# Patient Record
Sex: MALE | Race: WHITE | NOT HISPANIC OR LATINO | Employment: OTHER | URBAN - METROPOLITAN AREA
[De-identification: names, ages, dates, MRNs, and addresses within clinical notes are randomized per-mention and may not be internally consistent; named-entity substitution may affect disease eponyms.]

---

## 2017-01-30 ENCOUNTER — TELEPHONE (OUTPATIENT)
Dept: ONCOLOGY | Facility: CLINIC | Age: 67
End: 2017-01-30

## 2017-01-30 DIAGNOSIS — C61 PROSTATE CANCER (H): Chronic | ICD-10-CM

## 2017-01-30 LAB
ALBUMIN SERPL-MCNC: 3.5 G/DL (ref 3.4–5)
ALP SERPL-CCNC: 48 U/L (ref 40–150)
ALT SERPL W P-5'-P-CCNC: 36 U/L (ref 0–70)
ANION GAP SERPL CALCULATED.3IONS-SCNC: 10 MMOL/L (ref 3–14)
AST SERPL W P-5'-P-CCNC: 29 U/L (ref 0–45)
BASOPHILS # BLD AUTO: 0 10E9/L (ref 0–0.2)
BASOPHILS NFR BLD AUTO: 0.5 %
BILIRUB SERPL-MCNC: 0.6 MG/DL (ref 0.2–1.3)
BUN SERPL-MCNC: 13 MG/DL (ref 7–30)
CALCIUM SERPL-MCNC: 8.5 MG/DL (ref 8.5–10.1)
CHLORIDE SERPL-SCNC: 105 MMOL/L (ref 94–109)
CO2 SERPL-SCNC: 25 MMOL/L (ref 20–32)
CREAT SERPL-MCNC: 1 MG/DL (ref 0.66–1.25)
DIFFERENTIAL METHOD BLD: NORMAL
EOSINOPHIL # BLD AUTO: 0.2 10E9/L (ref 0–0.7)
EOSINOPHIL NFR BLD AUTO: 4 %
ERYTHROCYTE [DISTWIDTH] IN BLOOD BY AUTOMATED COUNT: 13.5 % (ref 10–15)
GFR SERPL CREATININE-BSD FRML MDRD: 75 ML/MIN/1.7M2
GLUCOSE SERPL-MCNC: 119 MG/DL (ref 70–99)
HCT VFR BLD AUTO: 41.9 % (ref 40–53)
HGB BLD-MCNC: 14.5 G/DL (ref 13.3–17.7)
LDH SERPL L TO P-CCNC: 174 U/L (ref 85–227)
LYMPHOCYTES # BLD AUTO: 2.1 10E9/L (ref 0.8–5.3)
LYMPHOCYTES NFR BLD AUTO: 34.6 %
MCH RBC QN AUTO: 31.9 PG (ref 26.5–33)
MCHC RBC AUTO-ENTMCNC: 34.6 G/DL (ref 31.5–36.5)
MCV RBC AUTO: 92 FL (ref 78–100)
MONOCYTES # BLD AUTO: 0.6 10E9/L (ref 0–1.3)
MONOCYTES NFR BLD AUTO: 10.3 %
NEUTROPHILS # BLD AUTO: 3 10E9/L (ref 1.6–8.3)
NEUTROPHILS NFR BLD AUTO: 50.6 %
PLATELET # BLD AUTO: 223 10E9/L (ref 150–450)
POTASSIUM SERPL-SCNC: 4.1 MMOL/L (ref 3.4–5.3)
PROT SERPL-MCNC: 7.1 G/DL (ref 6.8–8.8)
PSA SERPL-MCNC: 0.15 UG/L (ref 0–4)
RBC # BLD AUTO: 4.55 10E12/L (ref 4.4–5.9)
SODIUM SERPL-SCNC: 140 MMOL/L (ref 133–144)
WBC # BLD AUTO: 6 10E9/L (ref 4–11)

## 2017-01-30 PROCEDURE — 84403 ASSAY OF TOTAL TESTOSTERONE: CPT | Performed by: INTERNAL MEDICINE

## 2017-01-30 PROCEDURE — 80053 COMPREHEN METABOLIC PANEL: CPT | Performed by: INTERNAL MEDICINE

## 2017-01-30 PROCEDURE — 85025 COMPLETE CBC W/AUTO DIFF WBC: CPT | Performed by: INTERNAL MEDICINE

## 2017-01-30 PROCEDURE — 36415 COLL VENOUS BLD VENIPUNCTURE: CPT | Performed by: INTERNAL MEDICINE

## 2017-01-30 PROCEDURE — 83615 LACTATE (LD) (LDH) ENZYME: CPT | Performed by: INTERNAL MEDICINE

## 2017-01-30 PROCEDURE — 84153 ASSAY OF PSA TOTAL: CPT | Performed by: INTERNAL MEDICINE

## 2017-01-30 NOTE — TELEPHONE ENCOUNTER
Patient notified notified of PSA result 0.15 today. He has follow-up with Dr Norton on 2/6/17.    Tatiana Carbone RN

## 2017-01-31 LAB — TESTOST SERPL-MCNC: 725 NG/DL (ref 240–950)

## 2017-02-06 ENCOUNTER — ONCOLOGY VISIT (OUTPATIENT)
Dept: ONCOLOGY | Facility: CLINIC | Age: 67
End: 2017-02-06
Payer: COMMERCIAL

## 2017-02-06 VITALS
OXYGEN SATURATION: 97 % | RESPIRATION RATE: 20 BRPM | BODY MASS INDEX: 39.56 KG/M2 | WEIGHT: 261 LBS | TEMPERATURE: 98.2 F | HEIGHT: 68 IN | HEART RATE: 72 BPM | SYSTOLIC BLOOD PRESSURE: 179 MMHG | DIASTOLIC BLOOD PRESSURE: 82 MMHG

## 2017-02-06 DIAGNOSIS — C61 PROSTATE CANCER (H): Primary | Chronic | ICD-10-CM

## 2017-02-06 PROCEDURE — 99213 OFFICE O/P EST LOW 20 MIN: CPT | Performed by: INTERNAL MEDICINE

## 2017-02-06 ASSESSMENT — PAIN SCALES - GENERAL: PAINLEVEL: NO PAIN (0)

## 2017-02-06 NOTE — PROGRESS NOTES
[facility] CANCER CLINIC  FOLLOW-UP VISIT NOTE    PATIENT NAME: Boaz Acosta MRN # [id number]  DATE OF VISIT: Feb 6, 2017 YOB: 1950    REFERRING PROVIDER: Rocío Boss MD  [address] [address]    CANCER TYPE: Prostate adenocarcinoma  STAGE: IV  ECOG PS: 0    TREATMENT SUMMARY:  Boaz's annual screening PSA was noted to be elevated at 17 in 2012, and 21 in the following year and it was attributed to prostatitis. Eventually, he did change his primary care physician and his repeat PSA was noted to be elevated at 31 in 04/2014. He was then referred to Urology, and was seen by Dr. Pressley with Urology in 07/2014. He was worked up with a biopsy of the prostate on 08/01//2014, which revealed adenocarcinoma of the prostate with Willis score of 4+3 involving 12 of the 12 cores. He was staged with a PET/CT scan, which did not show any evidence of distant metastasis. There was evidence of increased FDG uptake throughout the prostate with a maximum SUV of 7.1. He had a bone scan done on 08/19/2014, which did not show any obvious evidence of metastasis. Mr. Acosta underwent a robotic-assisted radical prostatectomy and lymph node sampling performed by Dr. Hesham Prieto on 10/24/2014. The pathology from this revealed adenocarcinoma of the prostate with a Willis of 7 and a tertiary pattern of 5. There was extensive prostatic tissue involvement with over 75% of the tissue with evidence of disease. There was extensive extraprostatic extension involving the apex, right and left anterior and posterior base, and soft tissue around both seminal vesicles. There was bilateral seminal vesicle invasion with involvement of vas deferens. Margins were involved with bladder neck and bilateral posterior seminal vesicle soft tissue apex. There was evidence of perineural invasion. The lymph node sample was negative for disease. His postoperative PSA  in 12/2014 remained elevated at 7.1, and all the subsequent PSAs have remained positive. His restaging scans done in January and again in 03/2015, including a CT abdomen and pelvis and bone scans, have been negative. He was referred to Radiation Oncology for consideration of salvage radiation therapy, and was seen by Dr. Murphy on 03/24/2015. Dr. Murphy referred him to Dr. Zbigniew Chu at the [facility] to have a choline-11 PET/CT scan done. The choline-11 scan done at [place] demonstrated evidence of 2 retroperitoneal lymph nodes that are mildly positive for uptake. There was no abnormal uptake in the prostate bed to suggest local recurrence. He was started on bicalutamide, and received his first dose of Lupron on 05/12/2015.   He comes for a scheduled follow up visit    Oncology Supportive Medications 5/12/2015 9/2/2015 1/4/2016   leuprolide (LUPRON DEPOT) IM 30 mg 30 mg 30 mg     CURRENT TREATMENT  Break period of intermittent androgen deprivation therapy    RACHELLE Sandra comes for a scheduled follow up visit on androgen ablation therapy.     He is doing much better and feels great. He has improved energy and is feeling better. He was little shocked and worried after checking his testosteorne levels that are above 700. He denies taking any testosterone supplements.        PAST MEDICAL HISTORY   1. Prostate cancer with disease metastatic to para-aortic nodes   2. Borderline HTN  3. ADALID an CPAP at night  4. GERD      CURRENT OUTPATIENT MEDICATIONS     Current Outpatient Prescriptions   Medication Sig Dispense Refill     leuprolide (LUPRON DEPOT) 30 MG injection Inject 30 mg into the muscle every 4 months       chlorthalidone (HYGROTON) 25 MG tablet Take 1 tablet (25 mg) by mouth daily 90 tablet 1     omeprazole 20 MG tablet Take 1 tablet (20 mg) by mouth daily  30 tablet 1     ascorbic acid (VITAMIN C) 1000 MG TABS Take 1-2 tablets by mouth as needed        Multiple Vitamin (DAILY MULTIVITAMIN PO) Take 1 tablet by mouth  "daily.          ALLERGIES   No Known Allergies     REVIEW OF SYSTEMS   As above in the HPI, o/w complete 12-point ROS was negative.     PHYSICAL EXAM   /82 mmHg  Pulse 72  Temp(Src) 98.2  F (36.8  C) (Oral)  Resp 20  Ht 1.727 m (5' 7.99\")  Wt 118.389 kg (261 lb)  BMI 39.69 kg/m2  SpO2 97%    SpO2 Readings from Last 4 Encounters:   02/06/17 97%   08/01/16 96%   05/02/16 97%   04/15/16 98%     Wt Readings from Last 3 Encounters:   02/06/17 118.389 kg (261 lb)   08/01/16 116.801 kg (257 lb 8 oz)   05/02/16 117.028 kg (258 lb)     GEN: NAD  HEENT: PERRL, EOMI, no icterus, injection or pallor. Oropharynx is clear.  NECK: no cervical or supraclavicular lymphadenopathy  LUNGS: clear bilaterally  CV: regular, no murmurs, rubs, or gallops  ABDOMEN: soft, non-tender, non-distended, normal bowel sounds, no hepatosplenomegaly by percussion or palpation  EXT: warm, well perfused, no edema  NEURO: alert  SKIN: lesion in subxiphoid (epigastrium) as below     LABORATORY AND IMAGING STUDIES     Recent Labs   Lab Test  01/30/17   0702  07/28/16   0712  03/02/15   1648  10/25/14   0559  10/24/14   0615  09/26/14   0852   NA  140  138  140  139   --   137   POTASSIUM  4.1  4.6  3.9  4.0  3.4  4.2   CHLORIDE  105  103  104  103   --   102   CO2  25  30  32  34*   --   33*   ANIONGAP  10  5  4  2*   --   2*   BUN  13  19  15  15   --   17   CR  1.00  1.03  1.15  1.07  1.05  0.99   GLC  119*  106*  93  141*   --   142*   KERRY  8.5  9.1  9.1  8.4*   --   9.3     No results for input(s): MAG, PHOS in the last 74364 hours.  Recent Labs   Lab Test  01/30/17   0702  07/28/16   0712  10/25/14   0559  10/24/14   0615  09/26/14   0852  12/05/12   1259   WBC  6.0  5.6  10.5   --    --   5.8   HGB  14.5  13.4  12.2*  14.9  14.0  14.9   PLT  223  247  215   --    --   257   MCV  92  92  92   --    --   94   NEUTROPHIL  50.6  40.3   --    --    --   56.2     Recent Labs   Lab Test  01/30/17   0702  07/28/16   0712  12/05/12   1259 "   BILITOTAL  0.6  0.5  0.6   ALKPHOS  48  65  51   ALT  36  62  55   AST  29  35  46*   ALBUMIN  3.5  3.8  4.5   LDH  174  203   --      TSH   Date Value Ref Range Status   03/14/2005 2.22 0.4 - 5.0 mU/L Final     No results for input(s): CEA in the last 74946 hours.  Results for orders placed or performed in visit on 06/18/15   US Scrotum and Testicles    Narrative    Examination: Testicular ultrasound 6/18/2015 6:16 PM     Comparison: None.    History: Metastatic prostate cancer and left testicular mass.     Findings: There is homogeneous normal echotexture throughout the  bilateral testes. The left testicle measures 4.9 x 3.2 x 1.9 cm. The  left epididymal head measures 1.2 x 1.0 x 1.0 cm. The right testicle  measures 4.9 x 3.2 x 1.7 cm. The right epididymal head measures 1.3 x  0.7 x 1.1 cm. Doppler evaluation shows normal arterial waveforms to  the testes bilaterally. There is no hydrocele or varicocele. There is  no mass or abnormal calcifications within the testicles. Prominent  rete testes in the left testicle.     In the region of the left epididymal tail where the patient indicates  the palpable lump, there is a round nodule measuring 0.5 x 0.6 x 0.6  cm with a hyperechoic rim. The nodule is not hypervascular.      Impression    Impression: 0.6 cm round nodule with hyperechoic rim in the region of  the left epididymal tail. This may represent a thrombosed varicocele.  The nodule is extratesticular and therefore malignancy is highly  unlikely.    I have personally reviewed the examination and initial interpretation  and I agree with the findings.    MIKE QUACH MD     Recent Labs   Lab Test  01/30/17   0702  07/28/16   0712  04/28/16   0723  12/30/15   0832  08/19/15   0705   PSA  0.15  <0.01  <0.01  0.02  0.04   TESTOSTTOTAL  725  10*  <2*  12*  16*         ASSESSMENT AND PLAN   1. Prostate cancer with metastasis to retroperitoneal nodes with persistent PSA elevation post prostatectomy; ish 4+3  disease, tertiary score of 5  2. ECOG PS 0  3. No medical comorbiditiies    He is doing a whole lot better.  His testosterone is high at 725 and PSA remains low at 0.15. I feel that it is a good news that his testosterone levels are high given his rise in PSA. It would have been concerning if his testosterone was still 50 and he had rising PSA rather than the current high levels. At least now we can suppress it and would almost certainly expect a good response. We will continue with break from treatment as per the intermittent treatment protocol. We reviewed that there is no right or wrong strategy as both continuous and intermittent androgen deprivation gave very similar results. I value the good time with normal testosterone levels that are experienced in patients with intermittent androgen deprivation therapy. I will now see him in 3-4 months with labs a week prior to visit including CBC, CMP, PSA, testosterone.    He has a visit in his PCP in a week. He has no other health concerns.     Pavel Norton  ,  Division of Hematology, Oncology & Transplantation  [facility].

## 2017-02-06 NOTE — NURSING NOTE
"Boaz Acosta is a 66 year old male who presents for:  Chief Complaint   Patient presents with     Oncology Clinic Visit     6 mo f/u        Initial Vitals:  /82 mmHg  Pulse 72  Temp(Src) 98.2  F (36.8  C) (Oral)  Resp 20  Ht 1.727 m (5' 7.99\")  Wt 118.389 kg (261 lb)  BMI 39.69 kg/m2  SpO2 97% Estimated body mass index is 39.69 kg/(m^2) as calculated from the following:    Height as of this encounter: 1.727 m (5' 7.99\").    Weight as of this encounter: 118.389 kg (261 lb).. Body surface area is 2.38 meters squared. BP completed using cuff size: regular  No Pain (0) No LMP for male patient. Allergies and medications reviewed.     Medications: Medication refills not needed today.  Pharmacy name entered into [facility]:    [id number] [place] [facility] [place], [place] - [address] [facility] [id number] [place] [facility] [place] - [address] [facility]    Comments:     8 minutes for nursing intake (face to face time)   RAMIN GONZALEZ LPN          "

## 2017-02-12 ENCOUNTER — MYC MEDICAL ADVICE (OUTPATIENT)
Dept: FAMILY MEDICINE | Facility: CLINIC | Age: 67
End: 2017-02-12

## 2017-06-12 DIAGNOSIS — C61 PROSTATE CANCER (H): Chronic | ICD-10-CM

## 2017-06-12 LAB
ALBUMIN SERPL-MCNC: 3.4 G/DL (ref 3.4–5)
ALP SERPL-CCNC: 46 U/L (ref 40–150)
ALT SERPL W P-5'-P-CCNC: 68 U/L (ref 0–70)
ANION GAP SERPL CALCULATED.3IONS-SCNC: 6 MMOL/L (ref 3–14)
AST SERPL W P-5'-P-CCNC: 54 U/L (ref 0–45)
BASOPHILS # BLD AUTO: 0 10E9/L (ref 0–0.2)
BASOPHILS NFR BLD AUTO: 0.3 %
BILIRUB SERPL-MCNC: 0.4 MG/DL (ref 0.2–1.3)
BUN SERPL-MCNC: 18 MG/DL (ref 7–30)
CALCIUM SERPL-MCNC: 8.7 MG/DL (ref 8.5–10.1)
CHLORIDE SERPL-SCNC: 105 MMOL/L (ref 94–109)
CO2 SERPL-SCNC: 29 MMOL/L (ref 20–32)
CREAT SERPL-MCNC: 1.01 MG/DL (ref 0.66–1.25)
DIFFERENTIAL METHOD BLD: ABNORMAL
EOSINOPHIL # BLD AUTO: 0.3 10E9/L (ref 0–0.7)
EOSINOPHIL NFR BLD AUTO: 3.8 %
ERYTHROCYTE [DISTWIDTH] IN BLOOD BY AUTOMATED COUNT: 13.5 % (ref 10–15)
GFR SERPL CREATININE-BSD FRML MDRD: 74 ML/MIN/1.7M2
GLUCOSE SERPL-MCNC: 113 MG/DL (ref 70–99)
HCT VFR BLD AUTO: 40.6 % (ref 40–53)
HGB BLD-MCNC: 13.7 G/DL (ref 13.3–17.7)
LDH SERPL L TO P-CCNC: 220 U/L (ref 85–227)
LYMPHOCYTES # BLD AUTO: 2.2 10E9/L (ref 0.8–5.3)
LYMPHOCYTES NFR BLD AUTO: 33.2 %
MCH RBC QN AUTO: 32.2 PG (ref 26.5–33)
MCHC RBC AUTO-ENTMCNC: 33.7 G/DL (ref 31.5–36.5)
MCV RBC AUTO: 95 FL (ref 78–100)
MONOCYTES # BLD AUTO: 0.7 10E9/L (ref 0–1.3)
MONOCYTES NFR BLD AUTO: 10.9 %
NEUTROPHILS # BLD AUTO: 3.4 10E9/L (ref 1.6–8.3)
NEUTROPHILS NFR BLD AUTO: 51.8 %
PLATELET # BLD AUTO: 204 10E9/L (ref 150–450)
POTASSIUM SERPL-SCNC: 4.5 MMOL/L (ref 3.4–5.3)
PROT SERPL-MCNC: 6.9 G/DL (ref 6.8–8.8)
PSA SERPL-MCNC: 0.49 UG/L (ref 0–4)
RBC # BLD AUTO: 4.26 10E12/L (ref 4.4–5.9)
SODIUM SERPL-SCNC: 140 MMOL/L (ref 133–144)
WBC # BLD AUTO: 6.6 10E9/L (ref 4–11)

## 2017-06-12 PROCEDURE — 80053 COMPREHEN METABOLIC PANEL: CPT | Performed by: INTERNAL MEDICINE

## 2017-06-12 PROCEDURE — 36415 COLL VENOUS BLD VENIPUNCTURE: CPT | Performed by: INTERNAL MEDICINE

## 2017-06-12 PROCEDURE — 85025 COMPLETE CBC W/AUTO DIFF WBC: CPT | Performed by: INTERNAL MEDICINE

## 2017-06-12 PROCEDURE — 83615 LACTATE (LD) (LDH) ENZYME: CPT | Performed by: INTERNAL MEDICINE

## 2017-06-12 PROCEDURE — 84153 ASSAY OF PSA TOTAL: CPT | Performed by: INTERNAL MEDICINE

## 2017-06-12 PROCEDURE — 84403 ASSAY OF TOTAL TESTOSTERONE: CPT | Performed by: INTERNAL MEDICINE

## 2017-06-14 LAB — TESTOST SERPL-MCNC: 604 NG/DL (ref 240–950)

## 2017-06-19 ENCOUNTER — ONCOLOGY VISIT (OUTPATIENT)
Dept: ONCOLOGY | Facility: CLINIC | Age: 67
End: 2017-06-19
Payer: COMMERCIAL

## 2017-06-19 VITALS
HEIGHT: 68 IN | HEART RATE: 69 BPM | RESPIRATION RATE: 15 BRPM | TEMPERATURE: 97 F | OXYGEN SATURATION: 96 % | WEIGHT: 252 LBS | SYSTOLIC BLOOD PRESSURE: 159 MMHG | DIASTOLIC BLOOD PRESSURE: 91 MMHG | BODY MASS INDEX: 38.19 KG/M2

## 2017-06-19 DIAGNOSIS — C61 PROSTATE CANCER (H): Primary | Chronic | ICD-10-CM

## 2017-06-19 PROCEDURE — 99213 OFFICE O/P EST LOW 20 MIN: CPT | Performed by: INTERNAL MEDICINE

## 2017-06-19 ASSESSMENT — PAIN SCALES - GENERAL: PAINLEVEL: NO PAIN (0)
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
## 2017-06-19 NOTE — NURSING NOTE
"Oncology Rooming Note    June 19, 2017 8:34 AM   Boaz Acosta is a 66 year old male who presents for:    Chief Complaint   Patient presents with     Oncology Clinic Visit     follow up     Initial Vitals: BP (!) 159/91  Pulse 69  Temp 97  F (36.1  C)  Resp 15  Ht 1.727 m (5' 7.99\")  Wt 114.3 kg (252 lb)  SpO2 96%  BMI 38.33 kg/m2 Estimated body mass index is 38.33 kg/(m^2) as calculated from the following:    Height as of this encounter: 1.727 m (5' 7.99\").    Weight as of this encounter: 114.3 kg (252 lb). Body surface area is 2.34 meters squared.  No Pain (0) Comment: Data Unavailable   No LMP for male patient.  Allergies reviewed: Yes  Medications reviewed: Yes    Medications: Medication refills not needed today.  Pharmacy name entered into [facility]:    [facility] [place] [place] [facility] [address] [facility] [address] [place], [place] - [address] [facility]        5 minutes for nursing intake (face to face time)     Jelly Mann LPN          "
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
## 2017-10-09 ENCOUNTER — TELEPHONE (OUTPATIENT)
Dept: ONCOLOGY | Facility: CLINIC | Age: 67
End: 2017-10-09

## 2017-10-09 DIAGNOSIS — C61 PROSTATE CANCER (H): Chronic | ICD-10-CM

## 2017-10-09 LAB
ALBUMIN SERPL-MCNC: 3.5 G/DL (ref 3.4–5)
ALP SERPL-CCNC: 51 U/L (ref 40–150)
ALT SERPL W P-5'-P-CCNC: 46 U/L (ref 0–70)
ANION GAP SERPL CALCULATED.3IONS-SCNC: 7 MMOL/L (ref 3–14)
AST SERPL W P-5'-P-CCNC: 32 U/L (ref 0–45)
BASOPHILS # BLD AUTO: 0 10E9/L (ref 0–0.2)
BASOPHILS NFR BLD AUTO: 0.5 %
BILIRUB SERPL-MCNC: 0.5 MG/DL (ref 0.2–1.3)
BUN SERPL-MCNC: 15 MG/DL (ref 7–30)
CALCIUM SERPL-MCNC: 9.3 MG/DL (ref 8.5–10.1)
CHLORIDE SERPL-SCNC: 105 MMOL/L (ref 94–109)
CO2 SERPL-SCNC: 27 MMOL/L (ref 20–32)
CREAT SERPL-MCNC: 1.01 MG/DL (ref 0.66–1.25)
DIFFERENTIAL METHOD BLD: NORMAL
EOSINOPHIL # BLD AUTO: 0.2 10E9/L (ref 0–0.7)
EOSINOPHIL NFR BLD AUTO: 3.5 %
ERYTHROCYTE [DISTWIDTH] IN BLOOD BY AUTOMATED COUNT: 13.5 % (ref 10–15)
GFR SERPL CREATININE-BSD FRML MDRD: 74 ML/MIN/1.7M2
GLUCOSE SERPL-MCNC: 112 MG/DL (ref 70–99)
HCT VFR BLD AUTO: 43.5 % (ref 40–53)
HGB BLD-MCNC: 14.8 G/DL (ref 13.3–17.7)
LDH SERPL L TO P-CCNC: 204 U/L (ref 85–227)
LYMPHOCYTES # BLD AUTO: 2 10E9/L (ref 0.8–5.3)
LYMPHOCYTES NFR BLD AUTO: 31.9 %
MCH RBC QN AUTO: 32.2 PG (ref 26.5–33)
MCHC RBC AUTO-ENTMCNC: 34 G/DL (ref 31.5–36.5)
MCV RBC AUTO: 95 FL (ref 78–100)
MONOCYTES # BLD AUTO: 0.7 10E9/L (ref 0–1.3)
MONOCYTES NFR BLD AUTO: 11.4 %
NEUTROPHILS # BLD AUTO: 3.3 10E9/L (ref 1.6–8.3)
NEUTROPHILS NFR BLD AUTO: 52.7 %
PLATELET # BLD AUTO: 225 10E9/L (ref 150–450)
POTASSIUM SERPL-SCNC: 4.3 MMOL/L (ref 3.4–5.3)
PROT SERPL-MCNC: 7.6 G/DL (ref 6.8–8.8)
PSA SERPL-MCNC: 1.7 UG/L (ref 0–4)
RBC # BLD AUTO: 4.6 10E12/L (ref 4.4–5.9)
SODIUM SERPL-SCNC: 139 MMOL/L (ref 133–144)
WBC # BLD AUTO: 6.2 10E9/L (ref 4–11)

## 2017-10-09 PROCEDURE — 85025 COMPLETE CBC W/AUTO DIFF WBC: CPT | Performed by: INTERNAL MEDICINE

## 2017-10-09 PROCEDURE — 36415 COLL VENOUS BLD VENIPUNCTURE: CPT | Performed by: INTERNAL MEDICINE

## 2017-10-09 PROCEDURE — 80053 COMPREHEN METABOLIC PANEL: CPT | Performed by: INTERNAL MEDICINE

## 2017-10-09 PROCEDURE — 84153 ASSAY OF PSA TOTAL: CPT | Performed by: INTERNAL MEDICINE

## 2017-10-09 PROCEDURE — 83615 LACTATE (LD) (LDH) ENZYME: CPT | Performed by: INTERNAL MEDICINE

## 2017-10-09 PROCEDURE — 84403 ASSAY OF TOTAL TESTOSTERONE: CPT | Performed by: INTERNAL MEDICINE

## 2017-10-09 NOTE — TELEPHONE ENCOUNTER
Received vm from patient requesting PSA result from today. Writer returned call and provided result. Patient states it is not bad since Dr Norton instructed him he would probably not consider treating until it reached about 10.   Pascale Steel  RN, BSN, OCN

## 2017-10-11 LAB — TESTOST SERPL-MCNC: 654 NG/DL (ref 240–950)

## 2017-10-12 ENCOUNTER — MYC MEDICAL ADVICE (OUTPATIENT)
Dept: FAMILY MEDICINE | Facility: CLINIC | Age: 67
End: 2017-10-12
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
## 2017-10-12 RX ORDER — DOXYCYCLINE HYCLATE 100 MG
200 TABLET ORAL ONCE
Qty: 2 TABLET | Refills: 0 | Status: SHIPPED | OUTPATIENT
Start: 2017-10-12 | End: 2017-10-12

## 2017-10-12 NOTE — TELEPHONE ENCOUNTER
Boaz Acosta   to Rocío Boss MD          10/12/17 3:44 PM   The area, on my bicep, is already inflamed.   Lara Acosta   Thanks

## 2017-10-16 ENCOUNTER — ONCOLOGY VISIT (OUTPATIENT)
Dept: ONCOLOGY | Facility: CLINIC | Age: 67
End: 2017-10-16
Payer: MEDICARE

## 2017-10-16 VITALS
OXYGEN SATURATION: 97 % | TEMPERATURE: 98.3 F | HEART RATE: 71 BPM | SYSTOLIC BLOOD PRESSURE: 188 MMHG | BODY MASS INDEX: 38.04 KG/M2 | WEIGHT: 251 LBS | HEIGHT: 68 IN | DIASTOLIC BLOOD PRESSURE: 93 MMHG | RESPIRATION RATE: 15 BRPM

## 2017-10-16 DIAGNOSIS — Z72.0 TOBACCO ABUSE: ICD-10-CM

## 2017-10-16 DIAGNOSIS — Z23 NEED FOR PROPHYLACTIC VACCINATION AND INOCULATION AGAINST INFLUENZA: ICD-10-CM

## 2017-10-16 DIAGNOSIS — C61 PROSTATE CANCER (H): Primary | ICD-10-CM

## 2017-10-16 DIAGNOSIS — Z71.6 TOBACCO ABUSE COUNSELING: ICD-10-CM

## 2017-10-16 PROCEDURE — 99213 OFFICE O/P EST LOW 20 MIN: CPT | Performed by: INTERNAL MEDICINE

## 2017-10-16 RX ORDER — DOXYCYCLINE 40 MG/1
40 CAPSULE ORAL 2 TIMES DAILY
COMMUNITY
End: 2018-06-18

## 2017-10-16 ASSESSMENT — PAIN SCALES - GENERAL: PAINLEVEL: NO PAIN (0)

## 2017-10-16 NOTE — MR AVS SNAPSHOT
After Visit Summary   10/16/2017    Boaz Acosta    MRN: [id number]           Patient Information     Date Of Birth          1950        Visit Information        Provider Department      10/16/2017 8:30 AM Pavel Norton MD [facility]         Follow-ups after your visit        Your next 10 appointments already scheduled     Feb 12, 2018  8:10 AM CST   LAB with LAB FIRST FLOOR [facility] ([facility])    [address] [address] [place] [id number] [phone]           Patient must bring picture ID. Patient should be prepared to give a urine specimen  Please do not eat 10-12 hours before your appointment if you are coming in fasting for labs on lipids, cholesterol, or glucose (sugar). Pregnant women should follow their Care Team instructions. Water with medications is okay. Do not drink coffee or other fluids. If you have concerns about taking  your medications, please ask at office or if scheduling via [employer], send a message by clicking on Secure Messaging, Message Your Care Team.            Feb 19, 2018  8:30 AM CST   Return Visit with Pavel Norton MD   [facility] ([facility])    [address] [address] [place] [id number] [phone]              Who to contact     If you have questions or need follow up information about today's clinic visit or your schedule please contact [facility] directly at [phone].  Normal or non-critical lab and imaging results will be communicated to you by MyChart, letter or phone within 4 business days after the clinic has received the results. If you do not hear from us within 7 days, please contact the clinic through MyChart or phone. If you have a critical or abnormal lab result, we will notify you by phone as soon as possible.  Submit refill requests through [employer] or call your pharmacy and they will forward the refill  "request to us. Please allow 3 business days for your refill to be completed.          Additional Information About Your Visit        [employer] Information     [employer] gives you secure access to your electronic health record. If you see a primary care provider, you can also send messages to your care team and make appointments. If you have questions, please call your primary care clinic.  If you do not have a primary care provider, please call [phone] and they will assist you.      [employer] is an electronic gateway that provides easy, online access to your medical records. With [employer], you can request a clinic appointment, read your test results, renew a prescription or communicate with your care team.     To access your existing account, please contact your [facility] or call [phone] for assistance.        Care EveryWhere ID     This is your Care EveryWhere ID. This could be used by other organizations to access your [facility] medical records  [id number]        Your Vitals Were     Pulse Temperature Respirations Height Pulse Oximetry BMI (Body Mass Index)    71 98.3  F (36.8  C) 15 1.727 m (5' 7.99\") 97% 38.17 kg/m2       Blood Pressure from Last 3 Encounters:   10/16/17 (!) 188/93   06/19/17 (!) 159/91   02/06/17 179/82    Weight from Last 3 Encounters:   10/16/17 113.9 kg (251 lb)   06/19/17 114.3 kg (252 lb)   02/06/17 118.4 kg (261 lb)              Today, you had the following     No orders found for display       Primary Care Provider Office Phone # Fax #    Rocío Boss [phone] [address] [facility] [address]        Equal Access to Services     [facility] : Hadii maximus fernandes hadderick Sobobby, waaxda luqadaha, qaybta kaalmada re genao. So [facility] [phone].    ATENCIÓN: Si habla español, tiene a humphries disposición servicios gratuitos de asistencia lingüística. Llame al [phone].    We comply " with applicable federal civil rights laws and [place] laws. We do not discriminate on the basis of race, color, national origin, age, disability, sex, sexual orientation, or gender identity.            Thank you!     Thank you for choosing [facility]  for your care. Our goal is always to provide you with excellent care. Hearing back from our patients is one way we can continue to improve our services. Please take a few minutes to complete the written survey that you may receive in the mail after your visit with us. Thank you!             Your Updated Medication List - Protect others around you: Learn how to safely use, store and throw away your medicines at www.disposemymeds.org.          This list is accurate as of: 10/16/17  9:05 AM.  Always use your most recent med list.                   Brand Name Dispense Instructions for use Diagnosis    ascorbic acid 1000 MG Tabs    vitamin C     Take 1-2 tablets by mouth as needed        DAILY MULTIVITAMIN PO      Take 1 tablet by mouth daily.        doxycycline (Rosacea) 40 MG Cpdr CR capsule    ORACEA     Take 40 mg by mouth 2 times daily        EPIPEN 2-BRIAN 0.3 MG/0.3ML injection 2-pack   Generic drug:  EPINEPHrine      Inject 0.3 mLs (0.3 mg) into the muscle once as needed        omeprazole 20 MG tablet      Take 20 mg by mouth daily as needed        order for DME      PATIENT WAS SET UP ON A RESPIRONICS 560 AUTO MACHINE AT [employer] WITH HEATED HUMIDITY. PATIENT HAD A CHOICE OF MASK AND CHOSE THE AIRFIT P10 SIZE MEDIUM NASAL PILLOW. HE HAS A F/U APPOINTMENT TO HIS SLEEP STUDY 2/5 WITH TURNER GLASER PA-C AND A SECOND ONE SCHEDULED IN 6 WEEKS.

## 2017-10-16 NOTE — PROGRESS NOTES
[facility] CANCER CLINIC  FOLLOW-UP VISIT NOTE    PATIENT NAME: Boaz Acosta MRN # [id number]  DATE OF VISIT: Oct 16, 2017 YOB: 1950    REFERRING PROVIDER: Rocío Boss MD  [address] [address]    CANCER TYPE: Prostate adenocarcinoma  STAGE: IV  ECOG PS: 0    TREATMENT SUMMARY:  Boaz's annual screening PSA was noted to be elevated at 17 in 2012, and 21 in the following year and it was attributed to prostatitis. Eventually, he did change his primary care physician and his repeat PSA was noted to be elevated at 31 in 04/2014. He was then referred to Urology, and was seen by Dr. Pressley with Urology in 07/2014. He was worked up with a biopsy of the prostate on 08/01//2014, which revealed adenocarcinoma of the prostate with Willis score of 4+3 involving 12 of the 12 cores. He was staged with a PET/CT scan, which did not show any evidence of distant metastasis. There was evidence of increased FDG uptake throughout the prostate with a maximum SUV of 7.1. He had a bone scan done on 08/19/2014, which did not show any obvious evidence of metastasis. Mr. Acosta underwent a robotic-assisted radical prostatectomy and lymph node sampling performed by Dr. Hesham Prieto on 10/24/2014. The pathology from this revealed adenocarcinoma of the prostate with a [place] of 7 and a tertiary pattern of 5. There was extensive prostatic tissue involvement with over 75% of the tissue with evidence of disease. There was extensive extraprostatic extension involving the apex, right and left anterior and posterior base, and soft tissue around both seminal vesicles. There was bilateral seminal vesicle invasion with involvement of vas deferens. Margins were involved with bladder neck and bilateral posterior seminal vesicle soft tissue apex. There was evidence of perineural invasion. The lymph node sample was negative for disease. His postoperative PSA  in 12/2014 remained elevated at 7.1, and all the subsequent PSAs have remained positive. His restaging scans done in January and again in 03/2015, including a CT abdomen and pelvis and bone scans, have been negative. He was referred to Radiation Oncology for consideration of salvage radiation therapy, and was seen by Dr. Murphy on 03/24/2015. Dr. Murphy referred him to Dr. Zbigniew Chu at the [facility] to have a choline-11 PET/CT scan done. The choline-11 scan done at [facility] demonstrated evidence of 2 retroperitoneal lymph nodes that are mildly positive for uptake. There was no abnormal uptake in the prostate bed to suggest local recurrence. He was started on bicalutamide, and received his first dose of Lupron on 05/12/2015.   He comes for a scheduled follow up visit    Oncology Supportive Medications 5/12/2015 9/2/2015 1/4/2016   leuprolide (LUPRON DEPOT) IM 30 mg 30 mg 30 mg     CURRENT TREATMENT  Break period of intermittent androgen deprivation therapy    RACHELLE Sandra comes for a scheduled follow up visit on androgen ablation therapy.     He feels great. He has retired about 4 months ago. He had been on a trip for month with his son. He had been fly fishing in [place], attended a seminar in [facility] in [place].  His son had severe difficulty after being exposed to black mold where he had to stay outside the house in his tent. His son is doing much better now.       PAST MEDICAL HISTORY   1. Prostate cancer with disease metastatic to para-aortic nodes   2. Borderline HTN  3. ADALID an CPAP at night  4. GERD      CURRENT OUTPATIENT MEDICATIONS     Current Outpatient Prescriptions   Medication Sig Dispense Refill     omeprazole 20 MG tablet Take 1 tablet (20 mg) by mouth daily  30 tablet 1     ascorbic acid (VITAMIN C) 1000 MG TABS Take 1-2 tablets by mouth as needed        Multiple Vitamin (DAILY MULTIVITAMIN PO) Take 1 tablet by mouth daily.          ALLERGIES   No Known Allergies     REVIEW OF SYSTEMS   As above  "in the HPI, o/w complete 12-point ROS was negative.     PHYSICAL EXAM   BP (!) 188/93  Pulse 71  Temp 98.3  F (36.8  C)  Resp 15  Ht 1.727 m (5' 7.99\")  Wt 113.9 kg (251 lb)  SpO2 97%  BMI 38.17 kg/m2    SpO2 Readings from Last 4 Encounters:   10/16/17 97%   06/19/17 96%   02/06/17 97%   08/01/16 96%     Wt Readings from Last 3 Encounters:   10/16/17 113.9 kg (251 lb)   06/19/17 114.3 kg (252 lb)   02/06/17 118.4 kg (261 lb)     GEN: NAD  HEENT: PERRL, EOMI, no icterus, injection or pallor. Oropharynx is clear.  NECK: no cervical or supraclavicular lymphadenopathy  LUNGS: clear bilaterally  CV: regular, no murmurs, rubs, or gallops  ABDOMEN: soft, non-tender, non-distended, normal bowel sounds, no hepatosplenomegaly by percussion or palpation  EXT: warm, well perfused, no edema  NEURO: alert  SKIN: lesion in subxiphoid (epigastrium) as below     LABORATORY AND IMAGING STUDIES     Recent Labs   Lab Test  10/09/17   0750  06/12/17   0720  01/30/17   0702 07/28/16   0712  03/02/15   1648   NA  139  140  140  138  140   POTASSIUM  4.3  4.5  4.1  4.6  3.9   CHLORIDE  105  105  105  103  104   CO2  27  29  25  30  32   ANIONGAP  7  6  10  5  4   BUN  15  18  13  19  15   CR  1.01  1.01  1.00  1.03  1.15   GLC  112*  113*  119*  106*  93   KERRY  9.3  8.7  8.5  9.1  9.1     No results for input(s): MAG, PHOS in the last 21351 hours.  Recent Labs   Lab Test  10/09/17   0750  06/12/17   0720 01/30/17   0702  07/28/16   0712  10/25/14   0559   12/05/12   1259   WBC  6.2  6.6  6.0  5.6  10.5   --   5.8   HGB  14.8  13.7  14.5  13.4  12.2*   < >  14.9   PLT  225  204  223  247  215   --   257   MCV  95  95  92  92  92   --   94   NEUTROPHIL  52.7  51.8  50.6  40.3   --    --   56.2    < > = values in this interval not displayed.     Recent Labs   Lab Test  10/09/17   0750  06/12/17   0720  01/30/17   0702   BILITOTAL  0.5  0.4  0.6   ALKPHOS  51  46  48   ALT  46  68  36   AST  32  54*  29   ALBUMIN  3.5  3.4  3.5   LDH  " 204  220  174     TSH   Date Value Ref Range Status   03/14/2005 2.22 0.4 - 5.0 mU/L Final     No results for input(s): CEA in the last 50161 hours.  Results for orders placed or performed in visit on 06/18/15   US Scrotum and Testicles    Narrative    Examination: Testicular ultrasound 6/18/2015 6:16 PM     Comparison: None.    History: Metastatic prostate cancer and left testicular mass.     Findings: There is homogeneous normal echotexture throughout the  bilateral testes. The left testicle measures 4.9 x 3.2 x 1.9 cm. The  left epididymal head measures 1.2 x 1.0 x 1.0 cm. The right testicle  measures 4.9 x 3.2 x 1.7 cm. The right epididymal head measures 1.3 x  0.7 x 1.1 cm. Doppler evaluation shows normal arterial waveforms to  the testes bilaterally. There is no hydrocele or varicocele. There is  no mass or abnormal calcifications within the testicles. Prominent  rete testes in the left testicle.     In the region of the left epididymal tail where the patient indicates  the palpable lump, there is a round nodule measuring 0.5 x 0.6 x 0.6  cm with a hyperechoic rim. The nodule is not hypervascular.      Impression    Impression: 0.6 cm round nodule with hyperechoic rim in the region of  the left epididymal tail. This may represent a thrombosed varicocele.  The nodule is extratesticular and therefore malignancy is highly  unlikely.    I have personally reviewed the examination and initial interpretation  and I agree with the findings.    MIKE QUACH MD     Recent Labs   Lab Test  10/09/17   0750  06/12/17   0720  01/30/17   0702  07/28/16   0712  04/28/16   0723   PSA  1.70  0.49  0.15  <0.01  <0.01   TESTOSTTOTAL  654  604  725  10*  <2 *           ASSESSMENT AND PLAN   1. Prostate cancer with metastasis to retroperitoneal nodes with persistent PSA elevation post prostatectomy; ish 4+3 disease, tertiary score of 5  2. ECOG PS 0  3. No medical comorbiditiies    He is doing a whole lot better.  His  testosterone is high at 654 and PSA remains low at 1.7 though elevated from 0.49.  He has recently retired and is going through his checklist of activities.     He has no other health concerns - though he had elevated blood pressure at this visit.     I value the good time with normal testosterone levels that are experienced in patients with intermittent androgen deprivation therapy. I will now see him in 4 months with labs a week prior to visit including CBC, CMP, PSA, testosterone.    Pavel Norton  ,  Division of Hematology, Oncology & Transplantation  [facility].

## 2017-10-16 NOTE — NURSING NOTE
"Oncology Rooming Note    October 16, 2017 8:35 AM   Boaz Acosta is a 67 year old male who presents for:    Chief Complaint   Patient presents with     Oncology Clinic Visit     4 month follow up      Initial Vitals: BP (!) 188/93  Pulse 71  Temp 98.3  F (36.8  C)  Resp 15  Ht 1.727 m (5' 7.99\")  Wt 113.9 kg (251 lb)  SpO2 97%  BMI 38.17 kg/m2 Estimated body mass index is 38.17 kg/(m^2) as calculated from the following:    Height as of this encounter: 1.727 m (5' 7.99\").    Weight as of this encounter: 113.9 kg (251 lb). Body surface area is 2.34 meters squared.  No Pain (0) Comment: Data Unavailable   No LMP for male patient.  Allergies reviewed: Yes  Medications reviewed: Yes    Medications: Medication refills not needed today.  Pharmacy name entered into [facility]:    [facility] [place], [place] [facility] [address] [facility]  [facility] [place] [place] [facility] [address] [facility]        5 minutes for nursing intake (face to face time)     Jelly Mann LPN              "

## 2017-10-16 NOTE — PATIENT INSTRUCTIONS

## 2017-10-24 ENCOUNTER — MYC MEDICAL ADVICE (OUTPATIENT)
Dept: FAMILY MEDICINE | Facility: CLINIC | Age: 67
End: 2017-10-24

## 2017-10-30 ENCOUNTER — OFFICE VISIT (OUTPATIENT)
Dept: FAMILY MEDICINE | Facility: CLINIC | Age: 67
End: 2017-10-30
Payer: MEDICARE

## 2017-10-30 VITALS
SYSTOLIC BLOOD PRESSURE: 130 MMHG | HEIGHT: 68 IN | WEIGHT: 252 LBS | TEMPERATURE: 97.7 F | DIASTOLIC BLOOD PRESSURE: 84 MMHG | BODY MASS INDEX: 38.19 KG/M2 | HEART RATE: 61 BPM | OXYGEN SATURATION: 97 %

## 2017-10-30 DIAGNOSIS — R03.0 ELEVATED BLOOD PRESSURE READING WITHOUT DIAGNOSIS OF HYPERTENSION: ICD-10-CM

## 2017-10-30 DIAGNOSIS — M10.072 ACUTE IDIOPATHIC GOUT INVOLVING TOE OF LEFT FOOT: Primary | ICD-10-CM

## 2017-10-30 PROCEDURE — 36415 COLL VENOUS BLD VENIPUNCTURE: CPT | Performed by: FAMILY MEDICINE

## 2017-10-30 PROCEDURE — 99213 OFFICE O/P EST LOW 20 MIN: CPT | Performed by: FAMILY MEDICINE

## 2017-10-30 PROCEDURE — 84550 ASSAY OF BLOOD/URIC ACID: CPT | Performed by: FAMILY MEDICINE

## 2017-10-30 NOTE — PROGRESS NOTES
"  SUBJECTIVE:   Boaz Acosta is a 67 year old male who presents to clinic today for the following health issues:      Gout  Duration: 11 days  Description   Location: foot - right  Joint Swelling: YES- at onset  Redness: YES- at onset   Pain intensity:  Currently 3/10  Accompanying signs and symptoms: None  History  Previous history of gout: once in the spring   Trauma to the area: no   Precipitating factors:   Alcohol usage: Frequent  Diuretic use: no   Recent illness: yes  Therapies tried and outcome: taking ibuprofen     Patient states he has a strong fx of gout  This spring had episode on L 2nd toe - homeopathic treatment  Current episode episode started 2 weeks ago, was on a low carb/high fat diet  Patient has h/o prostate cancer 3 yrs ago    Problem list and histories reviewed & adjusted, as indicated.  Additional history: as documented    BP Readings from Last 3 Encounters:   10/30/17 130/84   10/16/17 (!) 188/93   06/19/17 (!) 159/91    Wt Readings from Last 3 Encounters:   10/30/17 252 lb (114.3 kg)   10/16/17 251 lb (113.9 kg)   06/19/17 252 lb (114.3 kg)            Reviewed and updated as needed this visit by clinical staffTobacco  Allergies  Meds  Problems  Med Hx  Surg Hx  Fam Hx  Soc Hx        Reviewed and updated as needed this visit by Provider  Allergies  Meds  Problems         ROS:  Constitutional, HEENT, cardiovascular, pulmonary, gi and gu systems are negative, except as otherwise noted.    OBJECTIVE:   /84  Pulse 61  Temp 97.7  F (36.5  C) (Oral)  Ht 5' 7.99\" (1.727 m)  Wt 252 lb (114.3 kg)  SpO2 97%  BMI 38.33 kg/m2  Body mass index is 38.33 kg/(m^2).  GENERAL: healthy, alert and no distress  NECK: no adenopathy, no asymmetry, masses, or scars and thyroid normal to palpation  RESP: lungs clear to auscultation - no rales, rhonchi or wheezes  CV: regular rate and rhythm, normal S1 S2, no S3 or S4, no murmur, click or rub, no peripheral edema and peripheral pulses " strong  MS: No joint deformity, no joint redness or swelling at this timeno gross musculoskeletal defects noted, no edema  SKIN: no suspicious lesions or rashes  NEURO: Normal strength and tone, mentation intact and speech normal  PSYCH: mentation appears normal, affect normal/bright    ASSESSMENT/PLAN:       1. Acute idiopathic gout involving toe of left foot  Will obtain baseline uric acid, patient would rather treat with diet changes than medications  - Uric acid    2. BMI 38.0-38.9,adult  - NUTRITION REFERRAL    3. Elevated blood pressure reading without diagnosis of hypertension  Will recheck in 3 months, Bp log given for him to monitor at home.    Follow-up in 3 months    Gianluca Singh MD  [facility]

## 2017-10-30 NOTE — NURSING NOTE
"Chief Complaint   Patient presents with     Foot Pain     Right foot pain x 11 days, possible Gout        Initial /80  Pulse 61  Temp 97.7  F (36.5  C) (Oral)  Ht 5' 7.99\" (1.727 m)  Wt 252 lb (114.3 kg)  SpO2 97%  BMI 38.33 kg/m2 Estimated body mass index is 38.33 kg/(m^2) as calculated from the following:    Height as of this encounter: 5' 7.99\" (1.727 m).    Weight as of this encounter: 252 lb (114.3 kg).  Medication Reconciliation: complete     An KARLA Mckeon    "

## 2017-10-30 NOTE — MR AVS SNAPSHOT
After Visit Summary   10/30/2017    Boaz Acosta    MRN: [id number]           Patient Information     Date Of Birth          1950        Visit Information        Provider Department      10/30/2017 4:00 PM Gianluca Powell MD [facility]        Today's Diagnoses     Acute idiopathic gout involving toe of left foot    -  1    Need for hepatitis C screening test        At risk for falling        Need for prophylactic vaccination and inoculation against influenza        Need for prophylactic vaccination against Streptococcus pneumoniae (pneumococcus)        BMI 38.0-38.9,adult          Care Instructions      Eating to Prevent Gout  Gout is a painful form of arthritis caused by an excess of uric acid. This is a waste product made by the body. It builds up in the body and forms crystals that collect in the joints, bringing on a gout attack. Alcohol and certain foods can trigger a gout attack. Below are some guidelines for changing your diet to help you manage gout. Your healthcare provider can work with you to determine the best eating plan for you. Know that diet is only one part of managing gout. Take your medicines as prescribed and follow the other guidelines your healthcare provider has given you.  Foods to limit  Eating too many foods containing purines may increase the levels of uric acid in your body and increase your risk for a gout attack. It may be best to limit these high-purine foods:    Alcohol (beer, red wine). You may be told to avoid alcohol completely.    Certain fish (anchovies, sardines, fish roes, herring, tuna, mussels, codfish, scallops, trout, and forest)    Certain meats (red meat, processed meat, lainez, turkey, wild game, and goose)    Sauces and gravies made with meat    Organ meats (such as liver, kidneys, sweetbreads, and tripe)    Legumes (such as dried beans, peas)    Mushrooms, spinach, asparagus, and cauliflower    Yeast and yeast  extract supplements  Foods to try  Some foods may be helpful for people with gout. You may want to try adding some of the following foods to your diet:    Dark berries: These include blueberries, blackberries, and cherries. These berries contain chemicals that may lower uric acid.    Tofu: Tofu, which is made from soy, is a good source of protein. Studies have shown that it may be a better choice than meat for people with gout.    Omega fatty acids: These acids are found in fatty fish (such as salmon), certain oils (such as flax, olive, or nut oils), or nuts. They may help prevent inflammation due to gout.  The following guidelines are recommended by the American Medical Association for people with gout. Your diet should be:    High in fiber, whole grains, fruits, and vegetables.    Low in protein (15% of calories should come from protein. Choose lean sources such as soy, lean meats, and poultry).    Low in fat (no more than 30% of calories should come from fat, with only 10% coming from animal fat).   Date Last Reviewed: 6/17/2015 2000-2017 [employer]. [address] [place] [address]. All rights reserved. This information is not intended as a substitute for professional medical care. Always follow your healthcare professional's instructions.    [facility]    If you have any questions regarding to your visit please contact your care team:       Team Purple:   Clinic Hours Telephone Number   Dr. Gay Singh   7am-7pm  Monday - Thursday   7am-5pm  Fridays  [phone]  (Appointment scheduling available 24/7)    Questions about your Visit?   Team Line:  [phone]   Urgent Care - [place] - 11am-9pm Monday-Friday Saturday-Sunday- 9am-5pm   [place] - 5pm-9pm Monday-Friday Saturday-Sunday- 9am-5pm  [phone] - [place]   [phone] - [place]       What options do I have for  visits at the clinic other than the traditional office visit?  To expand how we care for you, many of our providers are utilizing electronic visits (e-visits) and telephone visits, when medically appropriate, for interactions with their patients rather than a visit in the clinic.   We also offer nurse visits for many medical concerns. Just like any other service, we will bill your insurance company for this type of visit based on time spent on the phone with your provider. Not all insurance companies cover these visits. Please check with your medical insurance if this type of visit is covered. You will be responsible for any charges that are not paid by your insurance.      E-visits via [employer]oinfinity:  generally incur a $35.00 fee.  Telephone visits:  Time spent on the phone: *charged based on time that is spent on the phone in increments of 10 minutes. Estimated cost:   5-10 mins $30.00   11-20 mins. $59.00   21-30 mins. $85.00     Use [employer] (secure email communication and access to your chart) to send your primary care provider a message or make an appointment. Ask someone on your Team how to sign up for [employer].  For a Price Quote for your services, please call our [place] Line at [phone].  As always, Thank you for trusting us with your health care needs!    Discharge by KARLA PURCELL             Follow-ups after your visit        Additional Services     NUTRITION REFERRAL       Your provider has referred you to: FMG: [facility] [phone]   [email or web address]    Please be aware that coverage of these services is subject to the terms and limitations of your health insurance plan.  Call member services at your health plan with any benefit or coverage questions.      Please bring the following with you to your appointment:    (1) This referral request  (2) Any documents given to you regarding this referral  (3) Any specific questions you have about diet and/or food  choices                  Follow-up notes from your care team     Return in about 3 months (around 1/30/2018) for BP Recheck, weight loss.      Your next 10 appointments already scheduled     Feb 12, 2018  8:10 AM CST   LAB with LAB FIRST FLOOR [facility] ([facility])    [address] [place] [id number] [phone]           Please do not eat 10-12 hours before your appointment if you are coming in fasting for labs on lipids, cholesterol, or glucose (sugar). This does not apply to pregnant women. Water, hot tea and black coffee (with nothing added) are okay. Do not drink other fluids, diet soda or chew gum.            Feb 19, 2018  8:30 AM CST   Return Visit with Pavel Norton MD   [facility] ([facility])    [address] [address] [place] [id number] [phone]              Who to contact     If you have questions or need follow up information about today's clinic visit or your schedule please contact [facility] directly at [phone].  Normal or non-critical lab and imaging results will be communicated to you by MyChart, letter or phone within 4 business days after the clinic has received the results. If you do not hear from us within 7 days, please contact the clinic through MyChart or phone. If you have a critical or abnormal lab result, we will notify you by phone as soon as possible.  Submit refill requests through [employer] or call your pharmacy and they will forward the refill request to us. Please allow 3 business days for your refill to be completed.          Additional Information About Your Visit        MyChart Information     [employer] gives you secure access to your electronic health record. If you see a primary care provider, you can also send messages to your care team and make appointments. If you have questions, please call your primary care clinic.  If you do not have a primary  "care provider, please call [phone] and they will assist you.        Care EveryWhere ID     This is your Care EveryWhere ID. This could be used by other organizations to access your [place] medical records  [id number]        Your Vitals Were     Pulse Temperature Height Pulse Oximetry BMI (Body Mass Index)       61 97.7  F (36.5  C) (Oral) 5' 7.99\" (1.727 m) 97% 38.33 kg/m2        Blood Pressure from Last 3 Encounters:   10/30/17 170/80   10/16/17 (!) 188/93   06/19/17 (!) 159/91    Weight from Last 3 Encounters:   10/30/17 252 lb (114.3 kg)   10/16/17 251 lb (113.9 kg)   06/19/17 252 lb (114.3 kg)              We Performed the Following     NUTRITION REFERRAL     Uric acid        Primary Care Provider Office Phone # Fax #    Rocío Boss [phone]       [facility] [address]        Equal Access to Services     [facility]: Hadii aad ku hadasho Soomaali, waaxda luqadaha, qaybta kaalmada adeegyada, waxay dede haymikey spence . So [facility] [phone].    ATENCIÓN: Si habla español, tiene a humphries disposición servicios gratuitos de asistencia lingüística. Llame al [phone].    We comply with applicable federal civil rights laws and [place] laws. We do not discriminate on the basis of race, color, national origin, age, disability, sex, sexual orientation, or gender identity.            Thank you!     Thank you for choosing [facility]  for your care. Our goal is always to provide you with excellent care. Hearing back from our patients is one way we can continue to improve our services. Please take a few minutes to complete the written survey that you may receive in the mail after your visit with us. Thank you!             Your Updated Medication List - Protect others around you: Learn how to safely use, store and throw away your medicines at www.disposemymeds.org.          This list is accurate as of: 10/30/17  5:21 PM.  Always use your most " recent med list.                   Brand Name Dispense Instructions for use Diagnosis    ascorbic acid 1000 MG Tabs    vitamin C     Take 1-2 tablets by mouth as needed        DAILY MULTIVITAMIN PO      Take 1 tablet by mouth daily.        doxycycline (Rosacea) 40 MG Cpdr CR capsule    ORACEA     Take 40 mg by mouth 2 times daily        EPIPEN 2-BRIAN 0.3 MG/0.3ML injection 2-pack   Generic drug:  EPINEPHrine      Inject 0.3 mLs (0.3 mg) into the muscle once as needed        omeprazole 20 MG tablet      Take 20 mg by mouth daily as needed        order for DME      PATIENT WAS SET UP ON A RESPIRSilver Lining LimitedS 560 AUTO MACHINE AT PRESSURE 9CMH2O WITH HEATED HUMIDITY. PATIENT HAD A CHOICE OF MASK AND CHOSE THE AIRFIT P10 SIZE MEDIUM NASAL PILLOW. HE HAS A F/U APPOINTMENT TO HIS SLEEP STUDY 2/5 WITH TURNER GLASER PA-C AND A SECOND ONE SCHEDULED IN 6 WEEKS.

## 2017-10-30 NOTE — PATIENT INSTRUCTIONS
Eating to Prevent Gout  Gout is a painful form of arthritis caused by an excess of uric acid. This is a waste product made by the body. It builds up in the body and forms crystals that collect in the joints, bringing on a gout attack. Alcohol and certain foods can trigger a gout attack. Below are some guidelines for changing your diet to help you manage gout. Your healthcare provider can work with you to determine the best eating plan for you. Know that diet is only one part of managing gout. Take your medicines as prescribed and follow the other guidelines your healthcare provider has given you.  Foods to limit  Eating too many foods containing purines may increase the levels of uric acid in your body and increase your risk for a gout attack. It may be best to limit these high-purine foods:    Alcohol (beer, red wine). You may be told to avoid alcohol completely.    Certain fish (anchovies, sardines, fish roes, herring, tuna, mussels, codfish, scallops, trout, and forest)    Certain meats (red meat, processed meat, lainez, turkey, wild game, and goose)    Sauces and gravies made with meat    Organ meats (such as liver, kidneys, sweetbreads, and tripe)    Legumes (such as dried beans, peas)    Mushrooms, spinach, asparagus, and cauliflower    Yeast and yeast extract supplements  Foods to try  Some foods may be helpful for people with gout. You may want to try adding some of the following foods to your diet:    Dark berries: These include blueberries, blackberries, and cherries. These berries contain chemicals that may lower uric acid.    Tofu: Tofu, which is made from soy, is a good source of protein. Studies have shown that it may be a better choice than meat for people with gout.    Omega fatty acids: These acids are found in fatty fish (such as salmon), certain oils (such as flax, olive, or nut oils), or nuts. They may help prevent inflammation due to gout.  The following guidelines are recommended by the  American Medical Association for people with gout. Your diet should be:    High in fiber, whole grains, fruits, and vegetables.    Low in protein (15% of calories should come from protein. Choose lean sources such as soy, lean meats, and poultry).    Low in fat (no more than 30% of calories should come from fat, with only 10% coming from animal fat).   Date Last Reviewed: 6/17/2015 2000-2017 [employer]. [address] [place] [address]. All rights reserved. This information is not intended as a substitute for professional medical care. Always follow your healthcare professional's instructions.    [facility]    If you have any questions regarding to your visit please contact your care team:       Team Purple:   Clinic Hours Telephone Number   Dr. Gay Singh   7am-7pm  Monday - Thursday   7am-5pm  Fridays  [phone]  (Appointment scheduling available 24/7)    Questions about your Visit?   Team Line:  [phone]   Urgent Care - [place] [facility] - 11am-9pm Monday-Friday Saturday-Sunday- 9am-5pm   [place] - 5pm-9pm Monday-Friday Saturday-Sunday- 9am-5pm  [phone] - [facility]  [phone] - [place]       What options do I have for visits at the clinic other than the traditional office visit?  To expand how we care for you, many of our providers are utilizing electronic visits (e-visits) and telephone visits, when medically appropriate, for interactions with their patients rather than a visit in the clinic.   We also offer nurse visits for many medical concerns. Just like any other service, we will bill your insurance company for this type of visit based on time spent on the phone with your provider. Not all insurance companies cover these visits. Please check with your medical insurance if this type of visit is covered. You will be responsible for any charges that are not paid by  your insurance.      E-visits via [employer]:  generally incur a $35.00 fee.  Telephone visits:  Time spent on the phone: *charged based on time that is spent on the phone in increments of 10 minutes. Estimated cost:   5-10 mins $30.00   11-20 mins. $59.00   21-30 mins. $85.00     Use [employer] (secure email communication and access to your chart) to send your primary care provider a message or make an appointment. Ask someone on your Team how to sign up for [employer].  For a Price Quote for your services, please call our Consumer Price Line at [phone].  As always, Thank you for trusting us with your health care needs!    Discharge by KARLA PURCELL

## 2017-10-31 LAB — URATE SERPL-MCNC: 7.5 MG/DL (ref 3.5–7.2)

## 2018-02-12 ENCOUNTER — TELEPHONE (OUTPATIENT)
Dept: ONCOLOGY | Facility: CLINIC | Age: 68
End: 2018-02-12

## 2018-02-12 DIAGNOSIS — C61 PROSTATE CANCER (H): Chronic | ICD-10-CM

## 2018-02-12 LAB
ALBUMIN SERPL-MCNC: 3.7 G/DL (ref 3.4–5)
ALP SERPL-CCNC: 44 U/L (ref 40–150)
ALT SERPL W P-5'-P-CCNC: 49 U/L (ref 0–70)
ANION GAP SERPL CALCULATED.3IONS-SCNC: 8 MMOL/L (ref 3–14)
AST SERPL W P-5'-P-CCNC: 38 U/L (ref 0–45)
BASOPHILS # BLD AUTO: 0.1 10E9/L (ref 0–0.2)
BASOPHILS NFR BLD AUTO: 1 %
BILIRUB SERPL-MCNC: 0.5 MG/DL (ref 0.2–1.3)
BUN SERPL-MCNC: 16 MG/DL (ref 7–30)
CALCIUM SERPL-MCNC: 8.4 MG/DL (ref 8.5–10.1)
CHLORIDE SERPL-SCNC: 106 MMOL/L (ref 94–109)
CO2 SERPL-SCNC: 26 MMOL/L (ref 20–32)
CREAT SERPL-MCNC: 1.04 MG/DL (ref 0.66–1.25)
DIFFERENTIAL METHOD BLD: NORMAL
EOSINOPHIL # BLD AUTO: 0.2 10E9/L (ref 0–0.7)
EOSINOPHIL NFR BLD AUTO: 3.4 %
ERYTHROCYTE [DISTWIDTH] IN BLOOD BY AUTOMATED COUNT: 13 % (ref 10–15)
GFR SERPL CREATININE-BSD FRML MDRD: 71 ML/MIN/1.7M2
GLUCOSE SERPL-MCNC: 105 MG/DL (ref 70–99)
HCT VFR BLD AUTO: 43.5 % (ref 40–53)
HGB BLD-MCNC: 14.4 G/DL (ref 13.3–17.7)
IMM GRANULOCYTES # BLD: 0 10E9/L (ref 0–0.4)
IMM GRANULOCYTES NFR BLD: 0.2 %
LDH SERPL L TO P-CCNC: 197 U/L (ref 85–227)
LYMPHOCYTES # BLD AUTO: 2.4 10E9/L (ref 0.8–5.3)
LYMPHOCYTES NFR BLD AUTO: 40.5 %
MCH RBC QN AUTO: 31.4 PG (ref 26.5–33)
MCHC RBC AUTO-ENTMCNC: 33.1 G/DL (ref 31.5–36.5)
MCV RBC AUTO: 95 FL (ref 78–100)
MONOCYTES # BLD AUTO: 0.6 10E9/L (ref 0–1.3)
MONOCYTES NFR BLD AUTO: 10.6 %
NEUTROPHILS # BLD AUTO: 2.6 10E9/L (ref 1.6–8.3)
NEUTROPHILS NFR BLD AUTO: 44.3 %
PLATELET # BLD AUTO: 256 10E9/L (ref 150–450)
POTASSIUM SERPL-SCNC: 4.2 MMOL/L (ref 3.4–5.3)
PROT SERPL-MCNC: 7.6 G/DL (ref 6.8–8.8)
PSA SERPL-MCNC: 3.43 UG/L (ref 0–4)
RBC # BLD AUTO: 4.58 10E12/L (ref 4.4–5.9)
SODIUM SERPL-SCNC: 140 MMOL/L (ref 133–144)
WBC # BLD AUTO: 5.9 10E9/L (ref 4–11)

## 2018-02-12 PROCEDURE — 84403 ASSAY OF TOTAL TESTOSTERONE: CPT | Performed by: INTERNAL MEDICINE

## 2018-02-12 PROCEDURE — 80053 COMPREHEN METABOLIC PANEL: CPT | Performed by: INTERNAL MEDICINE

## 2018-02-12 PROCEDURE — 36415 COLL VENOUS BLD VENIPUNCTURE: CPT | Performed by: INTERNAL MEDICINE

## 2018-02-12 PROCEDURE — 85025 COMPLETE CBC W/AUTO DIFF WBC: CPT | Performed by: INTERNAL MEDICINE

## 2018-02-12 PROCEDURE — 84153 ASSAY OF PSA TOTAL: CPT | Performed by: INTERNAL MEDICINE

## 2018-02-12 PROCEDURE — 83615 LACTATE (LD) (LDH) ENZYME: CPT | Performed by: INTERNAL MEDICINE

## 2018-02-12 NOTE — TELEPHONE ENCOUNTER
Received call from patient requesting PSA result from this morning. Provided result. Patient denies further questions.  [employer]  RN, BSN, OCN

## 2018-02-14 LAB — TESTOST SERPL-MCNC: 557 NG/DL (ref 240–950)

## 2018-02-19 ENCOUNTER — CARE COORDINATION (OUTPATIENT)
Dept: ONCOLOGY | Facility: CLINIC | Age: 68
End: 2018-02-19

## 2018-02-19 NOTE — PROGRESS NOTES
Patient called to ask if he could post pone appointment with Dr. Norton until June (currently has appointment 2/22) as his PSA number is good at 3.4 and he feels well.  He usually is seen every 4 months.  Dr. Norton did advise that this would be a reasonable plan and patient can see him in June.  Patient informed and we will let him know of a time in June that he can be seen with labs one week prior.

## 2018-04-26 DIAGNOSIS — C79.82 METASTATIC ADENOCARCINOMA TO PROSTATE (H): Primary | ICD-10-CM

## 2018-04-27 DIAGNOSIS — C61 PROSTATE CANCER (H): Primary | Chronic | ICD-10-CM

## 2018-04-30 ENCOUNTER — CARE COORDINATION (OUTPATIENT)
Dept: ONCOLOGY | Facility: CLINIC | Age: 68
End: 2018-04-30

## 2018-04-30 NOTE — PROGRESS NOTES
Left message on patient's voicemail stating that Dr. Norton would like him to have his bone and CT scan first; and then will decide when to get him in for follow-up.  Call back requested if patient has further questions.

## 2018-04-30 NOTE — PROGRESS NOTES
Responded via phone for patient's [employer] messages.  Patient noticed over the past several days that his aches and pains are improved and he would prefer to wait until his visit with  in June to check his PSA then and discuss with Dr. Norton.  If Dr. Norton feels he needs scans, then he will schedule them then.  Appointments for CT, bone scan were canceled for 5/2.  Discussed with Dr. Norton prior to canceling and he agrees with plan.

## 2018-06-11 DIAGNOSIS — C79.82 METASTATIC ADENOCARCINOMA TO PROSTATE (H): ICD-10-CM

## 2018-06-11 DIAGNOSIS — C61 PROSTATE CANCER (H): Chronic | ICD-10-CM

## 2018-06-11 LAB
ALBUMIN SERPL-MCNC: 3.4 G/DL (ref 3.4–5)
ALP SERPL-CCNC: 47 U/L (ref 40–150)
ALT SERPL W P-5'-P-CCNC: 42 U/L (ref 0–70)
ANION GAP SERPL CALCULATED.3IONS-SCNC: 9 MMOL/L (ref 3–14)
AST SERPL W P-5'-P-CCNC: 33 U/L (ref 0–45)
BASOPHILS # BLD AUTO: 0.1 10E9/L (ref 0–0.2)
BASOPHILS NFR BLD AUTO: 1.2 %
BILIRUB SERPL-MCNC: 0.3 MG/DL (ref 0.2–1.3)
BUN SERPL-MCNC: 17 MG/DL (ref 7–30)
CALCIUM SERPL-MCNC: 9.2 MG/DL (ref 8.5–10.1)
CHLORIDE SERPL-SCNC: 107 MMOL/L (ref 94–109)
CO2 SERPL-SCNC: 26 MMOL/L (ref 20–32)
CREAT SERPL-MCNC: 1.1 MG/DL (ref 0.66–1.25)
DIFFERENTIAL METHOD BLD: ABNORMAL
EOSINOPHIL # BLD AUTO: 0.3 10E9/L (ref 0–0.7)
EOSINOPHIL NFR BLD AUTO: 4.8 %
ERYTHROCYTE [DISTWIDTH] IN BLOOD BY AUTOMATED COUNT: 13 % (ref 10–15)
GFR SERPL CREATININE-BSD FRML MDRD: 67 ML/MIN/1.7M2
GLUCOSE SERPL-MCNC: 117 MG/DL (ref 70–99)
HCT VFR BLD AUTO: 42 % (ref 40–53)
HGB BLD-MCNC: 14 G/DL (ref 13.3–17.7)
IMM GRANULOCYTES # BLD: 0 10E9/L (ref 0–0.4)
IMM GRANULOCYTES NFR BLD: 0.2 %
LDH SERPL L TO P-CCNC: 204 U/L (ref 85–227)
LYMPHOCYTES # BLD AUTO: 1.9 10E9/L (ref 0.8–5.3)
LYMPHOCYTES NFR BLD AUTO: 36.6 %
MCH RBC QN AUTO: 32.1 PG (ref 26.5–33)
MCHC RBC AUTO-ENTMCNC: 33.3 G/DL (ref 31.5–36.5)
MCV RBC AUTO: 96 FL (ref 78–100)
MONOCYTES # BLD AUTO: 0.6 10E9/L (ref 0–1.3)
MONOCYTES NFR BLD AUTO: 11.2 %
NEUTROPHILS # BLD AUTO: 2.4 10E9/L (ref 1.6–8.3)
NEUTROPHILS NFR BLD AUTO: 46 %
PLATELET # BLD AUTO: 222 10E9/L (ref 150–450)
POTASSIUM SERPL-SCNC: 4.2 MMOL/L (ref 3.4–5.3)
PROT SERPL-MCNC: 7.3 G/DL (ref 6.8–8.8)
PSA SERPL-MCNC: 5.56 UG/L (ref 0–4)
RBC # BLD AUTO: 4.36 10E12/L (ref 4.4–5.9)
SODIUM SERPL-SCNC: 142 MMOL/L (ref 133–144)
WBC # BLD AUTO: 5.2 10E9/L (ref 4–11)

## 2018-06-11 PROCEDURE — 80053 COMPREHEN METABOLIC PANEL: CPT | Performed by: INTERNAL MEDICINE

## 2018-06-11 PROCEDURE — 36415 COLL VENOUS BLD VENIPUNCTURE: CPT | Performed by: INTERNAL MEDICINE

## 2018-06-11 PROCEDURE — 84403 ASSAY OF TOTAL TESTOSTERONE: CPT | Performed by: INTERNAL MEDICINE

## 2018-06-11 PROCEDURE — 85025 COMPLETE CBC W/AUTO DIFF WBC: CPT | Performed by: INTERNAL MEDICINE

## 2018-06-11 PROCEDURE — 84153 ASSAY OF PSA TOTAL: CPT | Performed by: INTERNAL MEDICINE

## 2018-06-11 PROCEDURE — 83615 LACTATE (LD) (LDH) ENZYME: CPT | Performed by: INTERNAL MEDICINE

## 2018-06-12 LAB — TESTOST SERPL-MCNC: 523 NG/DL (ref 240–950)

## 2018-06-18 ENCOUNTER — ONCOLOGY VISIT (OUTPATIENT)
Dept: ONCOLOGY | Facility: CLINIC | Age: 68
End: 2018-06-18
Payer: MEDICARE

## 2018-06-18 VITALS
WEIGHT: 248.8 LBS | RESPIRATION RATE: 16 BRPM | TEMPERATURE: 98.7 F | DIASTOLIC BLOOD PRESSURE: 80 MMHG | BODY MASS INDEX: 37.84 KG/M2 | SYSTOLIC BLOOD PRESSURE: 168 MMHG | OXYGEN SATURATION: 96 % | HEART RATE: 69 BPM

## 2018-06-18 DIAGNOSIS — C61 PROSTATE CANCER (H): Primary | Chronic | ICD-10-CM

## 2018-06-18 PROCEDURE — 99213 OFFICE O/P EST LOW 20 MIN: CPT | Performed by: INTERNAL MEDICINE

## 2018-06-18 ASSESSMENT — PAIN SCALES - GENERAL: PAINLEVEL: NO PAIN (0)

## 2018-06-18 NOTE — MR AVS SNAPSHOT
After Visit Summary   6/18/2018    Boaz Acosta    MRN: [id number]           Patient Information     Date Of Birth          1950        Visit Information        Provider Department      6/18/2018 3:00 PM Pavel Norton MD [facility]        Today's Diagnoses     Prostate cancer (H)    -  1       Follow-ups after your visit        Your next 10 appointments already scheduled     Oct 22, 2018  8:00 AM CDT   LAB with LAB ONC [facility] ([facility])    [address] [place] [id number] [phone]           Please do not eat 10-12 hours before your appointment if you are coming in fasting for labs on lipids, cholesterol, or glucose (sugar). This does not apply to pregnant women. Water, hot tea and black coffee (with nothing added) are okay. Do not drink other fluids, diet soda or chew gum.            Oct 29, 2018  3:00 PM CDT   Return Visit with Pavel Norton MD   [facility] ([facility])    [address] [address] [place] [id number] [phone]              Future tests that were ordered for you today     Open Standing Orders        Priority Remaining Interval Expires Ordered    CBC with platelets differential Routine 25/25 6/18/2019 6/18/2018    Comprehensive metabolic panel Routine 25/25 6/18/2019 6/18/2018            Who to contact     If you have questions or need follow up information about today's clinic visit or your schedule please contact [facility] directly at [phone].  Normal or non-critical lab and imaging results will be communicated to you by MyChart, letter or phone within 4 business days after the clinic has received the results. If you do not hear from us within 7 days, please contact the clinic through MyChart or phone. If you have a critical or abnormal lab result, we will notify you by phone as soon as possible.  Submit refill  requests through [employer] or call your pharmacy and they will forward the refill request to us. Please allow 3 business days for your refill to be completed.          Additional Information About Your Visit        [employer] Information     [employer] gives you secure access to your electronic health record. If you see a primary care provider, you can also send messages to your care team and make appointments. If you have questions, please call your primary care clinic.  If you do not have a primary care provider, please call [phone] and they will assist you.      [employer] is an electronic gateway that provides easy, online access to your medical records. With [employer], you can request a clinic appointment, read your test results, renew a prescription or communicate with your care team.     To access your existing account, please contact your [facility] or call [phone] for assistance.        Care EveryWhere ID     This is your Care EveryWhere ID. This could be used by other organizations to access your [place] medical records  [id number]        Your Vitals Were     Pulse Temperature Respirations Pulse Oximetry BMI (Body Mass Index)       69 98.7  F (37.1  C) (Oral) 16 96% 37.84 kg/m2        Blood Pressure from Last 3 Encounters:   06/18/18 168/80   10/30/17 130/84   10/16/17 (!) 188/93    Weight from Last 3 Encounters:   06/18/18 112.9 kg (248 lb 12.8 oz)   10/30/17 114.3 kg (252 lb)   10/16/17 113.9 kg (251 lb)               Primary Care Provider Office Phone # Fax #    Gianluca Powell [phone] [address] [facility] [address]        Equal Access to Services     [facility] : Hadii maximus saba Sobobby, waaxda luqadaha, qaybta kaalre yu. So [facility] [phone].    ATENCIÓN: Si habla español, tiene a humphries disposición servicios gratuitos de asistencia lingüística. Llame al  [phone].    We comply with applicable federal civil rights laws and [place] laws. We do not discriminate on the basis of race, color, national origin, age, disability, sex, sexual orientation, or gender identity.            Thank you!     Thank you for choosing [facility]  for your care. Our goal is always to provide you with excellent care. Hearing back from our patients is one way we can continue to improve our services. Please take a few minutes to complete the written survey that you may receive in the mail after your visit with us. Thank you!             Your Updated Medication List - Protect others around you: Learn how to safely use, store and throw away your medicines at www.disposemymeds.org.          This list is accurate as of 6/18/18  3:44 PM.  Always use your most recent med list.                   Brand Name Dispense Instructions for use Diagnosis    ascorbic acid 1000 MG Tabs    vitamin C     Take 1-2 tablets by mouth as needed        DAILY MULTIVITAMIN PO      Take 1 tablet by mouth daily.        EPIPEN 2-BRIAN 0.3 MG/0.3ML injection 2-pack   Generic drug:  EPINEPHrine      Inject 0.3 mLs (0.3 mg) into the muscle once as needed        omeprazole 20 MG tablet      Take 20 mg by mouth daily as needed        order for DME      PATIENT WAS SET UP ON A [employer] 560 AUTO MACHINE AT PRESSURE 9CMH2O WITH HEATED HUMIDITY. PATIENT HAD A CHOICE OF MASK AND CHOSE THE AIRFIT P10 SIZE MEDIUM NASAL PILLOW. HE HAS A F/U APPOINTMENT TO HIS SLEEP STUDY 2/5 WITH TURNER GLASER PA-C AND A SECOND ONE SCHEDULED IN 6 WEEKS.

## 2018-06-18 NOTE — PROGRESS NOTES
[facility] CANCER CLINIC  FOLLOW-UP VISIT NOTE    PATIENT NAME: Boaz Acosta MRN # [id number]  DATE OF VISIT: Jun 18, 2018 YOB: 1950    REFERRING PROVIDER: Rocío Boss MD  [address] [address]    CANCER TYPE: Prostate adenocarcinoma  STAGE: IV  ECOG PS: 0    TREATMENT SUMMARY:  Boaz's annual screening PSA was noted to be elevated at 17 in 2012, and 21 in the following year and it was attributed to prostatitis. Eventually, he did change his primary care physician and his repeat PSA was noted to be elevated at 31 in 04/2014. He was then referred to Urology, and was seen by Dr. Pressley with Urology in 07/2014. He was worked up with a biopsy of the prostate on 08/01//2014, which revealed adenocarcinoma of the prostate with Willis score of 4+3 involving 12 of the 12 cores. He was staged with a PET/CT scan, which did not show any evidence of distant metastasis. There was evidence of increased FDG uptake throughout the prostate with a maximum SUV of 7.1. He had a bone scan done on 08/19/2014, which did not show any obvious evidence of metastasis. Mr. Acosta underwent a robotic-assisted radical prostatectomy and lymph node sampling performed by Dr. Hesham Prieto on 10/24/2014. The pathology from this revealed adenocarcinoma of the prostate with a [place] of 7 and a tertiary pattern of 5. There was extensive prostatic tissue involvement with over 75% of the tissue with evidence of disease. There was extensive extraprostatic extension involving the apex, right and left anterior and posterior base, and soft tissue around both seminal vesicles. There was bilateral seminal vesicle invasion with involvement of vas deferens. Margins were involved with bladder neck and bilateral posterior seminal vesicle soft tissue apex. There was evidence of perineural invasion. The lymph node sample was negative for disease. His postoperative PSA  in 12/2014 remained elevated at 7.1, and all the subsequent PSAs have remained positive. His restaging scans done in January and again in 03/2015, including a CT abdomen and pelvis and bone scans, have been negative. He was referred to Radiation Oncology for consideration of salvage radiation therapy, and was seen by Dr. Murphy on 03/24/2015. Dr. Murphy referred him to Dr. Zbigniew Chu at the [facility] to have a choline-11 PET/CT scan done. The choline-11 scan done at [place] demonstrated evidence of 2 retroperitoneal lymph nodes that are mildly positive for uptake. There was no abnormal uptake in the prostate bed to suggest local recurrence. He was started on bicalutamide, and received his first dose of Lupron on 05/12/2015.   He comes for a scheduled follow up visit    Oncology Supportive Medications 5/12/2015 9/2/2015 1/4/2016   leuprolide (LUPRON DEPOT) IM 30 mg 30 mg 30 mg     CURRENT TREATMENT  Break period of intermittent androgen deprivation therapy    RACHELLE Sandra comes for a scheduled follow up visit on androgen ablation therapy.     He feels great. He has retired about 8 months ago. His brother is in between jobs and has moved with him. Both of them are working out regularly and have been trying to lose weight. He swims three times a week. He is very good at swimming. He had been for fly fishing and walked for miles.       PAST MEDICAL HISTORY   1. Prostate cancer with disease metastatic to para-aortic nodes   2. Borderline HTN  3. ADALID an CPAP at night  4. GERD      CURRENT OUTPATIENT MEDICATIONS     Current Outpatient Prescriptions   Medication Sig Dispense Refill     omeprazole 20 MG tablet Take 1 tablet (20 mg) by mouth daily  30 tablet 1     ascorbic acid (VITAMIN C) 1000 MG TABS Take 1-2 tablets by mouth as needed        Multiple Vitamin (DAILY MULTIVITAMIN PO) Take 1 tablet by mouth daily.          ALLERGIES   No Known Allergies     REVIEW OF SYSTEMS   As above in the HPI, o/w complete 12-point ROS was  negative.     PHYSICAL EXAM   /80  Pulse 69  Temp 98.7  F (37.1  C) (Oral)  Resp 16  Wt 112.9 kg (248 lb 12.8 oz)  SpO2 96%  BMI 37.84 kg/m2    SpO2 Readings from Last 4 Encounters:   06/18/18 96%   10/30/17 97%   10/16/17 97%   06/19/17 96%     Wt Readings from Last 3 Encounters:   06/18/18 112.9 kg (248 lb 12.8 oz)   10/30/17 114.3 kg (252 lb)   10/16/17 113.9 kg (251 lb)     GEN: NAD  HEENT: PERRL, EOMI, no icterus, injection or pallor. Oropharynx is clear.  NECK: no cervical or supraclavicular lymphadenopathy  LUNGS: clear bilaterally  CV: regular, no murmurs, rubs, or gallops  ABDOMEN: soft, non-tender, non-distended, normal bowel sounds, no hepatosplenomegaly by percussion or palpation  EXT: warm, well perfused, no edema  NEURO: alert  SKIN: lesion in subxiphoid (epigastrium) as below     LABORATORY AND IMAGING STUDIES     Recent Labs   Lab Test  06/11/18   0742  02/12/18   0748  10/09/17   0750  06/12/17   0720  01/30/17   0702   NA  142  140  139  140  140   POTASSIUM  4.2  4.2  4.3  4.5  4.1   CHLORIDE  107  106  105  105  105   CO2  26  26  27  29  25   ANIONGAP  9  8  7  6  10   BUN  17  16  15  18  13   CR  1.10  1.04  1.01  1.01  1.00   GLC  117*  105*  112*  113*  119*   KERRY  9.2  8.4*  9.3  8.7  8.5     No results for input(s): MAG, PHOS in the last 50814 hours.  Recent Labs   Lab Test  06/11/18   0742  02/12/18   0748  10/09/17   0750  06/12/17   0720  01/30/17   0702   WBC  5.2  5.9  6.2  6.6  6.0   HGB  14.0  14.4  14.8  13.7  14.5   PLT  222  256  225  204  223   MCV  96  95  95  95  92   NEUTROPHIL  46.0  44.3  52.7  51.8  50.6     Recent Labs   Lab Test  06/11/18   0742  02/12/18   0748  10/09/17   0750   BILITOTAL  0.3  0.5  0.5   ALKPHOS  47  44  51   ALT  42  49  46   AST  33  38  32   ALBUMIN  3.4  3.7  3.5   LDH  204  197  204     TSH   Date Value Ref Range Status   03/14/2005 2.22 0.4 - 5.0 mU/L Final     No results for input(s): CEA in the last 54898 hours.  Results for orders  placed or performed in visit on 06/18/15   US Scrotum and Testicles    Narrative    Examination: Testicular ultrasound 6/18/2015 6:16 PM     Comparison: None.    History: Metastatic prostate cancer and left testicular mass.     Findings: There is homogeneous normal echotexture throughout the  bilateral testes. The left testicle measures 4.9 x 3.2 x 1.9 cm. The  left epididymal head measures 1.2 x 1.0 x 1.0 cm. The right testicle  measures 4.9 x 3.2 x 1.7 cm. The right epididymal head measures 1.3 x  0.7 x 1.1 cm. Doppler evaluation shows normal arterial waveforms to  the testes bilaterally. There is no hydrocele or varicocele. There is  no mass or abnormal calcifications within the testicles. Prominent  rete testes in the left testicle.     In the region of the left epididymal tail where the patient indicates  the palpable lump, there is a round nodule measuring 0.5 x 0.6 x 0.6  cm with a hyperechoic rim. The nodule is not hypervascular.      Impression    Impression: 0.6 cm round nodule with hyperechoic rim in the region of  the left epididymal tail. This may represent a thrombosed varicocele.  The nodule is extratesticular and therefore malignancy is highly  unlikely.    I have personally reviewed the examination and initial interpretation  and I agree with the findings.    MIKE QUACH MD     Recent Labs   Lab Test  06/11/18   0742  02/12/18   0748  10/09/17   0750  06/12/17   0720  01/30/17   0702   PSA  5.56*  3.43  1.70  0.49  0.15   TESTOSTTOTAL  523  557  654  604  725            ASSESSMENT AND PLAN   1. Prostate cancer with metastasis to retroperitoneal nodes with persistent PSA elevation post prostatectomy; ish 4+3 disease, tertiary score of 5  2. ECOG PS 0  3. No medical comorbiditiies    He is doing a whole lot better.  His testosterone is high at 523 and PSA remains low at 5.5 though elevated from 3.46.  He has recently retired and is going through his checklist of activities. He swims three  times a week. He regularly goes out for biking. He has been fly fishing in [facility].     He has been eating turkey tail mushroom after reading a blog where a patient with pancreatic cancer could control his disease with this. Besides he has been doing meditation for 20 min daily where he gives his cancer cells permission to leave his body.     He has no other health concerns - though he had elevated blood pressure at this visit.     I value the good time with normal testosterone levels that are experienced in patients with intermittent androgen deprivation therapy. I will now see him in 4 months with labs a week prior to visit including CBC, CMP, PSA, testosterone.    Pavel Norton  ,  Division of Hematology, Oncology & Transplantation  [facility].

## 2018-06-18 NOTE — NURSING NOTE
"Oncology Rooming Note    June 18, 2018 3:14 PM   Boaz Acosta is a 67 year old male who presents for:    Chief Complaint   Patient presents with     Oncology Clinic Visit     8 mon f/u     Initial Vitals: /80  Pulse 69  Temp 98.7  F (37.1  C) (Oral)  Resp 16  Wt 112.9 kg (248 lb 12.8 oz)  SpO2 96%  BMI 37.84 kg/m2 Estimated body mass index is 37.84 kg/(m^2) as calculated from the following:    Height as of 10/30/17: 1.727 m (5' 7.99\").    Weight as of this encounter: 112.9 kg (248 lb 12.8 oz). Body surface area is 2.33 meters squared.  No Pain (0) Comment: Data Unavailable   No LMP for male patient.  Allergies reviewed: Yes  Medications reviewed: Yes    Medications: Medication refills not needed today.  Pharmacy name entered into [facility]:    [id number] [place], [place] [facility] [address] [facility]  [facility] [place] [place] [facility] [address] [facility]         8 minutes for nursing intake (face to face time)     EVELYN QUICK RN              "

## 2018-06-18 NOTE — LETTER
6/18/2018         RE: Boaz Acosta  [address] [id number]        Dear Colleague,    Thank you for referring your patient, Boaz Acosta, to the [facility]. Please see a copy of my visit note below.    [facility] CANCER CLINIC  FOLLOW-UP VISIT NOTE    PATIENT NAME: Boaz Acosta MRN # [id number]  DATE OF VISIT: Jun 18, 2018 YOB: 1950    REFERRING PROVIDER: Rocío Boss MD  [facility]  [address] [facility] [place] [address]    CANCER TYPE: Prostate adenocarcinoma  STAGE: IV  ECOG PS: 0    TREATMENT SUMMARY:  Boaz's annual screening PSA was noted to be elevated at 17 in 2012, and 21 in the following year and it was attributed to prostatitis. Eventually, he did change his primary care physician and his repeat PSA was noted to be elevated at 31 in 04/2014. He was then referred to Urology, and was seen by Dr. Pressley with Urology in 07/2014. He was worked up with a biopsy of the prostate on 08/01//2014, which revealed adenocarcinoma of the prostate with Willis score of 4+3 involving 12 of the 12 cores. He was staged with a PET/CT scan, which did not show any evidence of distant metastasis. There was evidence of increased FDG uptake throughout the prostate with a maximum SUV of 7.1. He had a bone scan done on 08/19/2014, which did not show any obvious evidence of metastasis. Mr. Acosta underwent a robotic-assisted radical prostatectomy and lymph node sampling performed by Dr. Hesham Prieto on 10/24/2014. The pathology from this revealed adenocarcinoma of the prostate with a Willis of 7 and a tertiary pattern of 5. There was extensive prostatic tissue involvement with over 75% of the tissue with evidence of disease. There was extensive extraprostatic extension involving the apex, right and left anterior and posterior base, and soft tissue around both seminal vesicles. There was bilateral seminal vesicle invasion  with involvement of vas deferens. Margins were involved with bladder neck and bilateral posterior seminal vesicle soft tissue apex. There was evidence of perineural invasion. The lymph node sample was negative for disease. His postoperative PSA in 12/2014 remained elevated at 7.1, and all the subsequent PSAs have remained positive. His restaging scans done in January and again in 03/2015, including a CT abdomen and pelvis and bone scans, have been negative. He was referred to Radiation Oncology for consideration of salvage radiation therapy, and was seen by Dr. Murphy on 03/24/2015. Dr. Murphy referred him to Dr. Zbigniew Chu at the [facility] to have a choline-11 PET/CT scan done. The choline-11 scan done at [place] demonstrated evidence of 2 retroperitoneal lymph nodes that are mildly positive for uptake. There was no abnormal uptake in the prostate bed to suggest local recurrence. He was started on bicalutamide, and received his first dose of Lupron on 05/12/2015.   He comes for a scheduled follow up visit    Oncology Supportive Medications 5/12/2015 9/2/2015 1/4/2016   leuprolide (LUPRON DEPOT) IM 30 mg 30 mg 30 mg     CURRENT TREATMENT  Break period of intermittent androgen deprivation therapy    RACHELLE Sandra comes for a scheduled follow up visit on androgen ablation therapy.     He feels great. He has retired about 8 months ago. His brother is in between jobs and has moved with him. Both of them are working out regularly and have been trying to lose weight. He swims three times a week. He is very good at swimming. He had been for fly fishing and walked for miles.       PAST MEDICAL HISTORY   1. Prostate cancer with disease metastatic to para-aortic nodes   2. Borderline HTN  3. ADALID an CPAP at night  4. GERD      CURRENT OUTPATIENT MEDICATIONS     Current Outpatient Prescriptions   Medication Sig Dispense Refill     omeprazole 20 MG tablet Take 1 tablet (20 mg) by mouth daily  30 tablet 1     ascorbic acid (VITAMIN  C) 1000 MG TABS Take 1-2 tablets by mouth as needed        Multiple Vitamin (DAILY MULTIVITAMIN PO) Take 1 tablet by mouth daily.          ALLERGIES   No Known Allergies     REVIEW OF SYSTEMS   As above in the HPI, o/w complete 12-point ROS was negative.     PHYSICAL EXAM   /80  Pulse 69  Temp 98.7  F (37.1  C) (Oral)  Resp 16  Wt 112.9 kg (248 lb 12.8 oz)  SpO2 96%  BMI 37.84 kg/m2    SpO2 Readings from Last 4 Encounters:   06/18/18 96%   10/30/17 97%   10/16/17 97%   06/19/17 96%     Wt Readings from Last 3 Encounters:   06/18/18 112.9 kg (248 lb 12.8 oz)   10/30/17 114.3 kg (252 lb)   10/16/17 113.9 kg (251 lb)     GEN: NAD  HEENT: PERRL, EOMI, no icterus, injection or pallor. Oropharynx is clear.  NECK: no cervical or supraclavicular lymphadenopathy  LUNGS: clear bilaterally  CV: regular, no murmurs, rubs, or gallops  ABDOMEN: soft, non-tender, non-distended, normal bowel sounds, no hepatosplenomegaly by percussion or palpation  EXT: warm, well perfused, no edema  NEURO: alert  SKIN: lesion in subxiphoid (epigastrium) as below     LABORATORY AND IMAGING STUDIES     Recent Labs   Lab Test  06/11/18   0742  02/12/18   0748  10/09/17   0750  06/12/17   0720  01/30/17   0702   NA  142  140  139  140  140   POTASSIUM  4.2  4.2  4.3  4.5  4.1   CHLORIDE  107  106  105  105  105   CO2  26  26  27  29  25   ANIONGAP  9  8  7  6  10   BUN  17  16  15  18  13   CR  1.10  1.04  1.01  1.01  1.00   GLC  117*  105*  112*  113*  119*   KERRY  9.2  8.4*  9.3  8.7  8.5     No results for input(s): MAG, PHOS in the last 85862 hours.  Recent Labs   Lab Test  06/11/18   0742  02/12/18   0748  10/09/17   0750  06/12/17   0720  01/30/17   0702   WBC  5.2  5.9  6.2  6.6  6.0   HGB  14.0  14.4  14.8  13.7  14.5   PLT  222  256  225  204  223   MCV  96  95  95  95  92   NEUTROPHIL  46.0  44.3  52.7  51.8  50.6     Recent Labs   Lab Test  06/11/18   0742  02/12/18   0748  10/09/17   0750   BILITOTAL  0.3  0.5  0.5   ALKPHOS  47   44  51   ALT  42  49  46   AST  33  38  32   ALBUMIN  3.4  3.7  3.5   LDH  204  197  204     TSH   Date Value Ref Range Status   03/14/2005 2.22 0.4 - 5.0 mU/L Final     No results for input(s): CEA in the last 31985 hours.  Results for orders placed or performed in visit on 06/18/15   US Scrotum and Testicles    Narrative    Examination: Testicular ultrasound 6/18/2015 6:16 PM     Comparison: None.    History: Metastatic prostate cancer and left testicular mass.     Findings: There is homogeneous normal echotexture throughout the  bilateral testes. The left testicle measures 4.9 x 3.2 x 1.9 cm. The  left epididymal head measures 1.2 x 1.0 x 1.0 cm. The right testicle  measures 4.9 x 3.2 x 1.7 cm. The right epididymal head measures 1.3 x  0.7 x 1.1 cm. Doppler evaluation shows normal arterial waveforms to  the testes bilaterally. There is no hydrocele or varicocele. There is  no mass or abnormal calcifications within the testicles. Prominent  rete testes in the left testicle.     In the region of the left epididymal tail where the patient indicates  the palpable lump, there is a round nodule measuring 0.5 x 0.6 x 0.6  cm with a hyperechoic rim. The nodule is not hypervascular.      Impression    Impression: 0.6 cm round nodule with hyperechoic rim in the region of  the left epididymal tail. This may represent a thrombosed varicocele.  The nodule is extratesticular and therefore malignancy is highly  unlikely.    I have personally reviewed the examination and initial interpretation  and I agree with the findings.    MIKE QUACH MD     Recent Labs   Lab Test  06/11/18   0742  02/12/18   0748  10/09/17   0750  06/12/17   0720  01/30/17   0702   PSA  5.56*  3.43  1.70  0.49  0.15   TESTOSTTOTAL  523  557  654  604  725            ASSESSMENT AND PLAN   1. Prostate cancer with metastasis to retroperitoneal nodes with persistent PSA elevation post prostatectomy; ish 4+3 disease, tertiary score of 5  2. ECOG PS  0  3. No medical comorbiditiies    He is doing a whole lot better.  His testosterone is high at 523 and PSA remains low at 5.5 though elevated from 3.46.  He has recently retired and is going through his checklist of activities. He swims three times a week. He regularly goes out for biking. He has been fly fishing in [facility].     He has been eating turkey tail mushroom after reading a blog where a patient with pancreatic cancer could control his disease with this. Besides he has been doing meditation for 20 min daily where he gives his cancer cells permission to leave his body.     He has no other health concerns - though he had elevated blood pressure at this visit.     I value the good time with normal testosterone levels that are experienced in patients with intermittent androgen deprivation therapy. I will now see him in 4 months with labs a week prior to visit including CBC, CMP, PSA, testosterone.    Pavel Norton  ,  Division of Hematology, Oncology & Transplantation  [facility].       Again, thank you for allowing me to participate in the care of your patient.        Sincerely,        Pavel Norton MD

## 2018-10-22 ENCOUNTER — TELEPHONE (OUTPATIENT)
Dept: ONCOLOGY | Facility: CLINIC | Age: 68
End: 2018-10-22

## 2018-10-22 DIAGNOSIS — C61 PROSTATE CANCER (H): Chronic | ICD-10-CM

## 2018-10-22 LAB
ALBUMIN SERPL-MCNC: 3.5 G/DL (ref 3.4–5)
ALP SERPL-CCNC: 61 U/L (ref 40–150)
ALT SERPL W P-5'-P-CCNC: 62 U/L (ref 0–70)
ANION GAP SERPL CALCULATED.3IONS-SCNC: 6 MMOL/L (ref 3–14)
AST SERPL W P-5'-P-CCNC: 44 U/L (ref 0–45)
BASOPHILS # BLD AUTO: 0 10E9/L (ref 0–0.2)
BASOPHILS NFR BLD AUTO: 0.7 %
BILIRUB SERPL-MCNC: 0.7 MG/DL (ref 0.2–1.3)
BUN SERPL-MCNC: 17 MG/DL (ref 7–30)
CALCIUM SERPL-MCNC: 8.6 MG/DL (ref 8.5–10.1)
CHLORIDE SERPL-SCNC: 105 MMOL/L (ref 94–109)
CO2 SERPL-SCNC: 28 MMOL/L (ref 20–32)
CREAT SERPL-MCNC: 1.02 MG/DL (ref 0.66–1.25)
DIFFERENTIAL METHOD BLD: NORMAL
EOSINOPHIL # BLD AUTO: 0.2 10E9/L (ref 0–0.7)
EOSINOPHIL NFR BLD AUTO: 3.2 %
ERYTHROCYTE [DISTWIDTH] IN BLOOD BY AUTOMATED COUNT: 12.6 % (ref 10–15)
GFR SERPL CREATININE-BSD FRML MDRD: 73 ML/MIN/1.7M2
GLUCOSE SERPL-MCNC: 120 MG/DL (ref 70–99)
HCT VFR BLD AUTO: 44.2 % (ref 40–53)
HGB BLD-MCNC: 14.8 G/DL (ref 13.3–17.7)
IMM GRANULOCYTES # BLD: 0 10E9/L (ref 0–0.4)
IMM GRANULOCYTES NFR BLD: 0.2 %
LYMPHOCYTES # BLD AUTO: 1.9 10E9/L (ref 0.8–5.3)
LYMPHOCYTES NFR BLD AUTO: 33 %
MCH RBC QN AUTO: 31.6 PG (ref 26.5–33)
MCHC RBC AUTO-ENTMCNC: 33.5 G/DL (ref 31.5–36.5)
MCV RBC AUTO: 94 FL (ref 78–100)
MONOCYTES # BLD AUTO: 0.7 10E9/L (ref 0–1.3)
MONOCYTES NFR BLD AUTO: 12 %
NEUTROPHILS # BLD AUTO: 3 10E9/L (ref 1.6–8.3)
NEUTROPHILS NFR BLD AUTO: 50.9 %
PLATELET # BLD AUTO: 215 10E9/L (ref 150–450)
POTASSIUM SERPL-SCNC: 4.2 MMOL/L (ref 3.4–5.3)
PROT SERPL-MCNC: 7.5 G/DL (ref 6.8–8.8)
PSA SERPL-MCNC: 6.68 UG/L (ref 0–4)
RBC # BLD AUTO: 4.68 10E12/L (ref 4.4–5.9)
SODIUM SERPL-SCNC: 139 MMOL/L (ref 133–144)
WBC # BLD AUTO: 5.9 10E9/L (ref 4–11)

## 2018-10-22 PROCEDURE — 85025 COMPLETE CBC W/AUTO DIFF WBC: CPT | Performed by: INTERNAL MEDICINE

## 2018-10-22 PROCEDURE — 80053 COMPREHEN METABOLIC PANEL: CPT | Performed by: INTERNAL MEDICINE

## 2018-10-22 PROCEDURE — 84153 ASSAY OF PSA TOTAL: CPT | Performed by: INTERNAL MEDICINE

## 2018-10-22 PROCEDURE — 84403 ASSAY OF TOTAL TESTOSTERONE: CPT | Performed by: INTERNAL MEDICINE

## 2018-10-22 PROCEDURE — 36415 COLL VENOUS BLD VENIPUNCTURE: CPT | Performed by: INTERNAL MEDICINE

## 2018-10-22 NOTE — TELEPHONE ENCOUNTER
Per In basket on 10/22/2018 the appointment with Dr Norton was cancelled on 10/29/2018 per patient and Pascale request. This f/u appointment with Dr Norton and lab have been scheduled in February. I called the patient and left him a message and mailed out new appointment information.

## 2018-10-23 LAB — TESTOST SERPL-MCNC: 611 NG/DL (ref 240–950)

## 2019-02-18 ENCOUNTER — TELEPHONE (OUTPATIENT)
Dept: ONCOLOGY | Facility: CLINIC | Age: 69
End: 2019-02-18

## 2019-02-18 DIAGNOSIS — C61 PROSTATE CANCER (H): Chronic | ICD-10-CM

## 2019-02-18 DIAGNOSIS — C79.82 METASTATIC ADENOCARCINOMA TO PROSTATE (H): ICD-10-CM

## 2019-02-18 LAB
ALBUMIN SERPL-MCNC: 3.7 G/DL (ref 3.4–5)
ALP SERPL-CCNC: 57 U/L (ref 40–150)
ALT SERPL W P-5'-P-CCNC: 121 U/L (ref 0–70)
ANION GAP SERPL CALCULATED.3IONS-SCNC: 7 MMOL/L (ref 3–14)
AST SERPL W P-5'-P-CCNC: 76 U/L (ref 0–45)
BASOPHILS # BLD AUTO: 0.1 10E9/L (ref 0–0.2)
BASOPHILS NFR BLD AUTO: 0.9 %
BILIRUB SERPL-MCNC: 0.6 MG/DL (ref 0.2–1.3)
BUN SERPL-MCNC: 17 MG/DL (ref 7–30)
CALCIUM SERPL-MCNC: 9 MG/DL (ref 8.5–10.1)
CHLORIDE SERPL-SCNC: 107 MMOL/L (ref 94–109)
CO2 SERPL-SCNC: 26 MMOL/L (ref 20–32)
CREAT SERPL-MCNC: 1.02 MG/DL (ref 0.66–1.25)
DIFFERENTIAL METHOD BLD: NORMAL
EOSINOPHIL # BLD AUTO: 0.2 10E9/L (ref 0–0.7)
EOSINOPHIL NFR BLD AUTO: 3.6 %
ERYTHROCYTE [DISTWIDTH] IN BLOOD BY AUTOMATED COUNT: 12.7 % (ref 10–15)
GFR SERPL CREATININE-BSD FRML MDRD: 75 ML/MIN/{1.73_M2}
GLUCOSE SERPL-MCNC: 100 MG/DL (ref 70–99)
HCT VFR BLD AUTO: 44 % (ref 40–53)
HGB BLD-MCNC: 14.6 G/DL (ref 13.3–17.7)
IMM GRANULOCYTES # BLD: 0 10E9/L (ref 0–0.4)
IMM GRANULOCYTES NFR BLD: 0.2 %
LYMPHOCYTES # BLD AUTO: 2.1 10E9/L (ref 0.8–5.3)
LYMPHOCYTES NFR BLD AUTO: 30.8 %
MCH RBC QN AUTO: 31.3 PG (ref 26.5–33)
MCHC RBC AUTO-ENTMCNC: 33.2 G/DL (ref 31.5–36.5)
MCV RBC AUTO: 94 FL (ref 78–100)
MONOCYTES # BLD AUTO: 0.9 10E9/L (ref 0–1.3)
MONOCYTES NFR BLD AUTO: 14 %
NEUTROPHILS # BLD AUTO: 3.4 10E9/L (ref 1.6–8.3)
NEUTROPHILS NFR BLD AUTO: 50.5 %
PLATELET # BLD AUTO: 225 10E9/L (ref 150–450)
POTASSIUM SERPL-SCNC: 4.1 MMOL/L (ref 3.4–5.3)
PROT SERPL-MCNC: 7.7 G/DL (ref 6.8–8.8)
PSA SERPL-MCNC: 6.42 UG/L (ref 0–4)
RBC # BLD AUTO: 4.66 10E12/L (ref 4.4–5.9)
SODIUM SERPL-SCNC: 140 MMOL/L (ref 133–144)
WBC # BLD AUTO: 6.7 10E9/L (ref 4–11)

## 2019-02-18 PROCEDURE — 85025 COMPLETE CBC W/AUTO DIFF WBC: CPT | Performed by: INTERNAL MEDICINE

## 2019-02-18 PROCEDURE — 84403 ASSAY OF TOTAL TESTOSTERONE: CPT | Performed by: INTERNAL MEDICINE

## 2019-02-18 PROCEDURE — 84153 ASSAY OF PSA TOTAL: CPT | Performed by: INTERNAL MEDICINE

## 2019-02-18 PROCEDURE — 36415 COLL VENOUS BLD VENIPUNCTURE: CPT | Performed by: INTERNAL MEDICINE

## 2019-02-18 PROCEDURE — 80053 COMPREHEN METABOLIC PANEL: CPT | Performed by: INTERNAL MEDICINE

## 2019-02-18 NOTE — TELEPHONE ENCOUNTER
TC from patient. Would like PSA result from this morning. Provided it is 6.42. He states it has gone down, so he does not need to keep appointment with Dr Norton this week. States per Dr Norton, if PSA less than 10, he does not need to be seen. Writer instructed patient, message would be routed to Dr Norton and if he wants to see patient, writer will call back. Otherwise he can just reschedule for June.  Pascale Steel  RN, BSN, OCN

## 2019-02-19 LAB — TESTOST SERPL-MCNC: 625 NG/DL (ref 240–950)

## 2019-02-20 NOTE — TELEPHONE ENCOUNTER
Noted received from [id number]. Informing PCP right away. Patient currently out of town this week.    Age limit for an E-visit or phone visit regarding tick bite is from 6-65 y.o.  Patient is 67 y.o  Please advise    Lisa Oropeza RN

## 2019-04-22 DIAGNOSIS — C61 PROSTATE CANCER (H): Primary | Chronic | ICD-10-CM

## 2019-06-17 DIAGNOSIS — C61 PROSTATE CANCER (H): Chronic | ICD-10-CM

## 2019-06-17 LAB
ALBUMIN SERPL-MCNC: 3.5 G/DL (ref 3.4–5)
ALP SERPL-CCNC: 63 U/L (ref 40–150)
ALT SERPL W P-5'-P-CCNC: 60 U/L (ref 0–70)
ANION GAP SERPL CALCULATED.3IONS-SCNC: 6 MMOL/L (ref 3–14)
AST SERPL W P-5'-P-CCNC: 43 U/L (ref 0–45)
BASOPHILS # BLD AUTO: 0.1 10E9/L (ref 0–0.2)
BASOPHILS NFR BLD AUTO: 0.8 %
BILIRUB SERPL-MCNC: 0.4 MG/DL (ref 0.2–1.3)
BUN SERPL-MCNC: 21 MG/DL (ref 7–30)
CALCIUM SERPL-MCNC: 8.7 MG/DL (ref 8.5–10.1)
CHLORIDE SERPL-SCNC: 108 MMOL/L (ref 94–109)
CO2 SERPL-SCNC: 28 MMOL/L (ref 20–32)
CREAT SERPL-MCNC: 1 MG/DL (ref 0.66–1.25)
DIFFERENTIAL METHOD BLD: NORMAL
EOSINOPHIL # BLD AUTO: 0.2 10E9/L (ref 0–0.7)
EOSINOPHIL NFR BLD AUTO: 2.4 %
ERYTHROCYTE [DISTWIDTH] IN BLOOD BY AUTOMATED COUNT: 13.7 % (ref 10–15)
GFR SERPL CREATININE-BSD FRML MDRD: 77 ML/MIN/{1.73_M2}
GLUCOSE SERPL-MCNC: 123 MG/DL (ref 70–99)
HCT VFR BLD AUTO: 44 % (ref 40–53)
HGB BLD-MCNC: 14.4 G/DL (ref 13.3–17.7)
IMM GRANULOCYTES # BLD: 0 10E9/L (ref 0–0.4)
IMM GRANULOCYTES NFR BLD: 0.3 %
LYMPHOCYTES # BLD AUTO: 2.1 10E9/L (ref 0.8–5.3)
LYMPHOCYTES NFR BLD AUTO: 34.2 %
MCH RBC QN AUTO: 31.7 PG (ref 26.5–33)
MCHC RBC AUTO-ENTMCNC: 32.7 G/DL (ref 31.5–36.5)
MCV RBC AUTO: 97 FL (ref 78–100)
MONOCYTES # BLD AUTO: 0.8 10E9/L (ref 0–1.3)
MONOCYTES NFR BLD AUTO: 12 %
NEUTROPHILS # BLD AUTO: 3.1 10E9/L (ref 1.6–8.3)
NEUTROPHILS NFR BLD AUTO: 50.3 %
PLATELET # BLD AUTO: 206 10E9/L (ref 150–450)
POTASSIUM SERPL-SCNC: 4.1 MMOL/L (ref 3.4–5.3)
PROT SERPL-MCNC: 7.5 G/DL (ref 6.8–8.8)
PSA SERPL-MCNC: 10.2 UG/L (ref 0–4)
RBC # BLD AUTO: 4.54 10E12/L (ref 4.4–5.9)
SODIUM SERPL-SCNC: 142 MMOL/L (ref 133–144)
WBC # BLD AUTO: 6.3 10E9/L (ref 4–11)

## 2019-06-17 PROCEDURE — 36415 COLL VENOUS BLD VENIPUNCTURE: CPT | Performed by: INTERNAL MEDICINE

## 2019-06-17 PROCEDURE — 80053 COMPREHEN METABOLIC PANEL: CPT | Performed by: INTERNAL MEDICINE

## 2019-06-17 PROCEDURE — 85025 COMPLETE CBC W/AUTO DIFF WBC: CPT | Performed by: INTERNAL MEDICINE

## 2019-06-17 PROCEDURE — 84153 ASSAY OF PSA TOTAL: CPT | Performed by: INTERNAL MEDICINE

## 2019-06-20 ENCOUNTER — ONCOLOGY VISIT (OUTPATIENT)
Dept: ONCOLOGY | Facility: CLINIC | Age: 69
End: 2019-06-20
Payer: COMMERCIAL

## 2019-06-20 VITALS
WEIGHT: 251.99 LBS | TEMPERATURE: 98.1 F | RESPIRATION RATE: 16 BRPM | DIASTOLIC BLOOD PRESSURE: 90 MMHG | HEIGHT: 68 IN | HEART RATE: 66 BPM | OXYGEN SATURATION: 96 % | SYSTOLIC BLOOD PRESSURE: 148 MMHG | BODY MASS INDEX: 38.19 KG/M2

## 2019-06-20 VITALS
HEIGHT: 68 IN | RESPIRATION RATE: 16 BRPM | OXYGEN SATURATION: 96 % | SYSTOLIC BLOOD PRESSURE: 148 MMHG | WEIGHT: 252.06 LBS | DIASTOLIC BLOOD PRESSURE: 90 MMHG | BODY MASS INDEX: 38.2 KG/M2 | TEMPERATURE: 98.1 F | HEART RATE: 66 BPM

## 2019-06-20 DIAGNOSIS — C61 PROSTATE CANCER (H): Primary | Chronic | ICD-10-CM

## 2019-06-20 DIAGNOSIS — C79.82 METASTATIC ADENOCARCINOMA TO PROSTATE (H): Primary | ICD-10-CM

## 2019-06-20 DIAGNOSIS — C61 PROSTATE CANCER (H): ICD-10-CM

## 2019-06-20 PROCEDURE — 99213 OFFICE O/P EST LOW 20 MIN: CPT | Mod: 25 | Performed by: INTERNAL MEDICINE

## 2019-06-20 PROCEDURE — 96402 CHEMO HORMON ANTINEOPL SQ/IM: CPT

## 2019-06-20 RX ORDER — BICALUTAMIDE 50 MG/1
50 TABLET, FILM COATED ORAL DAILY
Qty: 14 TABLET | Refills: 0 | Status: SHIPPED | OUTPATIENT
Start: 2019-06-20 | End: 2019-08-06

## 2019-06-20 ASSESSMENT — MIFFLIN-ST. JEOR
SCORE: 1892.72
SCORE: 1892.37

## 2019-06-20 ASSESSMENT — PAIN SCALES - GENERAL
PAINLEVEL: NO PAIN (0)
PAINLEVEL: NO PAIN (0)

## 2019-06-20 NOTE — PROGRESS NOTES
Infusion Nursing Note:  Boaz Acosta presents today for Lupron.    Patient seen by provider today: Yes: Dr. Norton   present during visit today: Not Applicable.    Note: Patient assessed by Dr. Norton & JADIEL Moore prior to injection. See their notes for assessment.    Intravenous Access:  No Intravenous access/labs at this visit.    Treatment Conditions:  Not Applicable.      Post Infusion Assessment:  Patient tolerated injection without incident.       Discharge Plan:   Patient and/or family verbalized understanding of discharge instructions and all questions answered.  Copy of AVS reviewed with patient and/or family.  Patient will return 10/31/19 for next appointment.  Patient discharged in stable condition accompanied by: self.  Departure Mode: Ambulatory.    Judi Cortez LPN

## 2019-06-20 NOTE — LETTER
6/20/2019         RE: Boaz Acosta  [address] [facility] [address]        Dear Colleague,    Thank you for referring your patient, Boaz Acosta, to the [facility]. Please see a copy of my visit note below.    [facility] CANCER CLINIC  FOLLOW-UP VISIT NOTE    PATIENT NAME: Boaz Acosta MRN # [id number]  DATE OF VISIT: Jun 20, 2019 YOB: 1950    REFERRING PROVIDER: Rocío Boss MD  [facility]  [address] [facility] [place] [address]    CANCER TYPE: Prostate adenocarcinoma  STAGE: IV  ECOG PS: 0    TREATMENT SUMMARY:  Boaz's annual screening PSA was noted to be elevated at 17 in 2012, and 21 in the following year and it was attributed to prostatitis. Eventually, he did change his primary care physician and his repeat PSA was noted to be elevated at 31 in 04/2014. He was then referred to Urology, and was seen by Dr. Pressley with Urology in 07/2014. He was worked up with a biopsy of the prostate on 08/01//2014, which revealed adenocarcinoma of the prostate with Willis score of 4+3 involving 12 of the 12 cores. He was staged with a PET/CT scan, which did not show any evidence of distant metastasis. There was evidence of increased FDG uptake throughout the prostate with a maximum SUV of 7.1. He had a bone scan done on 08/19/2014, which did not show any obvious evidence of metastasis. Mr. Acosta underwent a robotic-assisted radical prostatectomy and lymph node sampling performed by Dr. Hesham Prieto on 10/24/2014. The pathology from this revealed adenocarcinoma of the prostate with a Willis of 7 and a tertiary pattern of 5. There was extensive prostatic tissue involvement with over 75% of the tissue with evidence of disease. There was extensive extraprostatic extension involving the apex, right and left anterior and posterior base, and soft tissue around both seminal vesicles. There was bilateral seminal vesicle invasion  with involvement of vas deferens. Margins were involved with bladder neck and bilateral posterior seminal vesicle soft tissue apex. There was evidence of perineural invasion. The lymph node sample was negative for disease. His postoperative PSA in 12/2014 remained elevated at 7.1, and all the subsequent PSAs have remained positive. His restaging scans done in January and again in 03/2015, including a CT abdomen and pelvis and bone scans, have been negative. He was referred to Radiation Oncology for consideration of salvage radiation therapy, and was seen by Dr. Murphy on 03/24/2015. Dr. Murphy referred him to Dr. Zbigniew Chu at the [facility] to have a choline-11 PET/CT scan done. The choline-11 scan done at [facility] demonstrated evidence of 2 retroperitoneal lymph nodes that are mildly positive for uptake. There was no abnormal uptake in the prostate bed to suggest local recurrence. He was started on bicalutamide, and received his first dose of Lupron on 05/12/2015.   He comes for a scheduled follow up visit    Oncology Supportive Medications 5/12/2015 9/2/2015 1/4/2016   leuprolide (LUPRON DEPOT) IM 30 mg 30 mg 30 mg     CURRENT TREATMENT  Break period of intermittent androgen deprivation therapy    RACHELLE Sandra comes for a scheduled follow up visit on androgen ablation therapy.     He feels great. He has retired about a year ago. He has again gained weight. He is planning to do exercise again but has not been able to do it as yet.       PAST MEDICAL HISTORY   1. Prostate cancer with disease metastatic to para-aortic nodes   2. Borderline HTN  3. ADALID an CPAP at night  4. GERD      CURRENT OUTPATIENT MEDICATIONS     Current Outpatient Prescriptions   Medication Sig Dispense Refill     omeprazole 20 MG tablet Take 1 tablet (20 mg) by mouth daily  30 tablet 1     ascorbic acid (VITAMIN C) 1000 MG TABS Take 1-2 tablets by mouth as needed        Multiple Vitamin (DAILY MULTIVITAMIN PO) Take 1 tablet by mouth daily.    "       ALLERGIES   No Known Allergies     REVIEW OF SYSTEMS   As above in the HPI, o/w complete 12-point ROS was negative.     PHYSICAL EXAM   /90 (BP Location: Right arm)   Pulse 66   Temp 98.1  F (36.7  C) (Oral)   Resp 16   Ht 1.727 m (5' 7.99\")   Wt 114.3 kg (252 lb 1 oz)   SpO2 96%   BMI 38.33 kg/m       SpO2 Readings from Last 4 Encounters:   06/18/18 96%   10/30/17 97%   10/16/17 97%   06/19/17 96%     Wt Readings from Last 3 Encounters:   06/20/19 114.3 kg (252 lb 1 oz)   06/18/18 112.9 kg (248 lb 12.8 oz)   10/30/17 114.3 kg (252 lb)     GEN: NAD  HEENT: PERRL, EOMI, no icterus, injection or pallor. Oropharynx is clear.  NECK: no cervical or supraclavicular lymphadenopathy  LUNGS: clear bilaterally  CV: regular, no murmurs, rubs, or gallops  ABDOMEN: soft, non-tender, non-distended, normal bowel sounds, no hepatosplenomegaly by percussion or palpation  EXT: warm, well perfused, no edema  NEURO: alert  SKIN: lesion in subxiphoid (epigastrium) as below     LABORATORY AND IMAGING STUDIES     Recent Labs   Lab Test 06/17/19 0741 02/18/19  0759 10/22/18  0744 06/11/18  0742 02/12/18  0748    140 139 142 140   POTASSIUM 4.1 4.1 4.2 4.2 4.2   CHLORIDE 108 107 105 107 106   CO2 28 26 28 26 26   ANIONGAP 6 7 6 9 8   BUN 21 17 17 17 16   CR 1.00 1.02 1.02 1.10 1.04   * 100* 120* 117* 105*   KERRY 8.7 9.0 8.6 9.2 8.4*     No results for input(s): MAG, PHOS in the last 15602 hours.  Recent Labs   Lab Test 06/17/19  0741 02/18/19  0759 10/22/18  0744 06/11/18  0742 02/12/18  0748   WBC 6.3 6.7 5.9 5.2 5.9   HGB 14.4 14.6 14.8 14.0 14.4    225 215 222 256   MCV 97 94 94 96 95   NEUTROPHIL 50.3 50.5 50.9 46.0 44.3     Recent Labs   Lab Test 06/17/19  0741 02/18/19  0759 10/22/18  0744 06/11/18  0742 02/12/18  0748 10/09/17  0750   BILITOTAL 0.4 0.6 0.7 0.3 0.5 0.5   ALKPHOS 63 57 61 47 44 51   ALT 60 121* 62 42 49 46   AST 43 76* 44 33 38 32   ALBUMIN 3.5 3.7 3.5 3.4 3.7 3.5   LDH  --   --  "  --  204 197 204     TSH   Date Value Ref Range Status   03/14/2005 2.22 0.4 - 5.0 mU/L Final     Recent Labs   Lab Test 06/17/19  0741 02/18/19  0759 10/22/18  0744 06/11/18  0742 02/12/18  0748 10/09/17  0750   PSA 10.20* 6.42* 6.68* 5.56* 3.43 1.70   TESTOSTTOTAL  --  625 611 523 557 654      ASSESSMENT AND PLAN   1. Prostate cancer with metastasis to retroperitoneal nodes with persistent PSA elevation post prostatectomy; ish 4+3 disease, tertiary score of 5  2. ECOG PS 0  3. No medical comorbiditiies    He is doing well and has no complains.  His testosterone is pending from today but was normal/ high at 625 at last visit. His PSA has gone up to 10.2. We had decided that we would restart therapy once he is close to 10. He has crossed the mae. He is willing to restart therapy. He remembers having a lot of difficulty with bicalutamide. I explained rationale to block the effects of testosterone surge and the fact that we could get away without it. He would like to continue with same sequence and is willing to take it. I have prescribed bicalutamide for 2 weeks for him.     His weight and blood pressures are up. He understands the value of exercise and weight loss. He has done triathlons in the past. He did a long section of the [employer] trail recently and had fun. He will follow up with his primary care for his elevated blood pressures.      I will again see him in 4 months with labs a week prior to visit including CBC, CMP, PSA, testosterone.    Pavel Norton  ,  Division of Hematology, Oncology & Transplantation  [facility].       Again, thank you for allowing me to participate in the care of your patient.        Sincerely,        Pavel Norton MD

## 2019-06-20 NOTE — NURSING NOTE
"Oncology Rooming Note    June 20, 2019 9:11 AM   Boaz Acosta is a 68 year old male who presents for:    Chief Complaint   Patient presents with     Oncology Clinic Visit     Follow up     Initial Vitals: /90 (BP Location: Right arm)   Pulse 66   Temp 98.1  F (36.7  C) (Oral)   Resp 16   Ht 1.727 m (5' 7.99\")   Wt 114.3 kg (252 lb 1 oz)   SpO2 96%   BMI 38.33 kg/m   Estimated body mass index is 38.33 kg/m  as calculated from the following:    Height as of this encounter: 1.727 m (5' 7.99\").    Weight as of this encounter: 114.3 kg (252 lb 1 oz). Body surface area is 2.34 meters squared.  No Pain (0) Comment: Data Unavailable   No LMP for male patient.  Allergies reviewed: Yes  Medications reviewed: Yes    Medications: Medication refills not needed today.  Pharmacy name entered into [facility]:    [id number] [place], [place] - [address]  [facility] [place], [place] - [address]    Donna Parnell LPN              "

## 2019-06-20 NOTE — PROGRESS NOTES
[facility] CANCER CLINIC  FOLLOW-UP VISIT NOTE    PATIENT NAME: Boaz Acosta MRN # [id number]  DATE OF VISIT: Jun 20, 2019 YOB: 1950    REFERRING PROVIDER: Rocío Boss MD  [address] [address]    CANCER TYPE: Prostate adenocarcinoma  STAGE: IV  ECOG PS: 0    TREATMENT SUMMARY:  Boaz's annual screening PSA was noted to be elevated at 17 in 2012, and 21 in the following year and it was attributed to prostatitis. Eventually, he did change his primary care physician and his repeat PSA was noted to be elevated at 31 in 04/2014. He was then referred to Urology, and was seen by Dr. Pressley with Urology in 07/2014. He was worked up with a biopsy of the prostate on 08/01//2014, which revealed adenocarcinoma of the prostate with Willis score of 4+3 involving 12 of the 12 cores. He was staged with a PET/CT scan, which did not show any evidence of distant metastasis. There was evidence of increased FDG uptake throughout the prostate with a maximum SUV of 7.1. He had a bone scan done on 08/19/2014, which did not show any obvious evidence of metastasis. Mr. Acosta underwent a robotic-assisted radical prostatectomy and lymph node sampling performed by Dr. Hesham Prieto on 10/24/2014. The pathology from this revealed adenocarcinoma of the prostate with a Ol[place] of 7 and a tertiary pattern of 5. There was extensive prostatic tissue involvement with over 75% of the tissue with evidence of disease. There was extensive extraprostatic extension involving the apex, right and left anterior and posterior base, and soft tissue around both seminal vesicles. There was bilateral seminal vesicle invasion with involvement of vas deferens. Margins were involved with bladder neck and bilateral posterior seminal vesicle soft tissue apex. There was evidence of perineural invasion. The lymph node sample was negative for disease. His postoperative PSA  in 12/2014 remained elevated at 7.1, and all the subsequent PSAs have remained positive. His restaging scans done in January and again in 03/2015, including a CT abdomen and pelvis and bone scans, have been negative. He was referred to Radiation Oncology for consideration of salvage radiation therapy, and was seen by Dr. Murphy on 03/24/2015. Dr. Murphy referred him to Dr. Zbigniew Chu at the [facility] to have a choline-11 PET/CT scan done. The choline-11 scan done at [facility] demonstrated evidence of 2 retroperitoneal lymph nodes that are mildly positive for uptake. There was no abnormal uptake in the prostate bed to suggest local recurrence. He was started on bicalutamide, and received his first dose of Lupron on 05/12/2015.   He comes for a scheduled follow up visit    Oncology Supportive Medications 5/12/2015 9/2/2015 1/4/2016   leuprolide (LUPRON DEPOT) IM 30 mg 30 mg 30 mg     CURRENT TREATMENT  Break period of intermittent androgen deprivation therapy    RACHELLE Sandra comes for a scheduled follow up visit on androgen ablation therapy.     He feels great. He has retired about a year ago. He has again gained weight. He is planning to do exercise again but has not been able to do it as yet.       PAST MEDICAL HISTORY   1. Prostate cancer with disease metastatic to para-aortic nodes   2. Borderline HTN  3. ADALID an CPAP at night  4. GERD      CURRENT OUTPATIENT MEDICATIONS     Current Outpatient Prescriptions   Medication Sig Dispense Refill     omeprazole 20 MG tablet Take 1 tablet (20 mg) by mouth daily  30 tablet 1     ascorbic acid (VITAMIN C) 1000 MG TABS Take 1-2 tablets by mouth as needed        Multiple Vitamin (DAILY MULTIVITAMIN PO) Take 1 tablet by mouth daily.          ALLERGIES   No Known Allergies     REVIEW OF SYSTEMS   As above in the HPI, o/w complete 12-point ROS was negative.     PHYSICAL EXAM   /90 (BP Location: Right arm)   Pulse 66   Temp 98.1  F (36.7  C) (Oral)   Resp 16   Ht 1.727 m  "(5' 7.99\")   Wt 114.3 kg (252 lb 1 oz)   SpO2 96%   BMI 38.33 kg/m      SpO2 Readings from Last 4 Encounters:   06/18/18 96%   10/30/17 97%   10/16/17 97%   06/19/17 96%     Wt Readings from Last 3 Encounters:   06/20/19 114.3 kg (252 lb 1 oz)   06/18/18 112.9 kg (248 lb 12.8 oz)   10/30/17 114.3 kg (252 lb)     GEN: NAD  HEENT: PERRL, EOMI, no icterus, injection or pallor. Oropharynx is clear.  NECK: no cervical or supraclavicular lymphadenopathy  LUNGS: clear bilaterally  CV: regular, no murmurs, rubs, or gallops  ABDOMEN: soft, non-tender, non-distended, normal bowel sounds, no hepatosplenomegaly by percussion or palpation  EXT: warm, well perfused, no edema  NEURO: alert  SKIN: lesion in subxiphoid (epigastrium) as below     LABORATORY AND IMAGING STUDIES     Recent Labs   Lab Test 06/17/19 0741 02/18/19  0759 10/22/18  0744 06/11/18  0742 02/12/18  0748    140 139 142 140   POTASSIUM 4.1 4.1 4.2 4.2 4.2   CHLORIDE 108 107 105 107 106   CO2 28 26 28 26 26   ANIONGAP 6 7 6 9 8   BUN 21 17 17 17 16   CR 1.00 1.02 1.02 1.10 1.04   * 100* 120* 117* 105*   KERRY 8.7 9.0 8.6 9.2 8.4*     No results for input(s): MAG, PHOS in the last 77451 hours.  Recent Labs   Lab Test 06/17/19  0741 02/18/19  0759 10/22/18  0744 06/11/18  0742 02/12/18  0748   WBC 6.3 6.7 5.9 5.2 5.9   HGB 14.4 14.6 14.8 14.0 14.4    225 215 222 256   MCV 97 94 94 96 95   NEUTROPHIL 50.3 50.5 50.9 46.0 44.3     Recent Labs   Lab Test 06/17/19  0741 02/18/19  0759 10/22/18  0744 06/11/18  0742 02/12/18  0748 10/09/17  0750   BILITOTAL 0.4 0.6 0.7 0.3 0.5 0.5   ALKPHOS 63 57 61 47 44 51   ALT 60 121* 62 42 49 46   AST 43 76* 44 33 38 32   ALBUMIN 3.5 3.7 3.5 3.4 3.7 3.5   LDH  --   --   --  204 197 204     TSH   Date Value Ref Range Status   03/14/2005 2.22 0.4 - 5.0 mU/L Final     Recent Labs   Lab Test 06/17/19  0741 02/18/19  0759 10/22/18  0744 06/11/18  0742 02/12/18  0748 10/09/17  0750   PSA 10.20* 6.42* 6.68* 5.56* 3.43 " 1.70   TESTOSTTOTAL  --  625 611 523 556 654      ASSESSMENT AND PLAN   1. Prostate cancer with metastasis to retroperitoneal nodes with persistent PSA elevation post prostatectomy; ish 4+3 disease, tertiary score of 5  2. ECOG PS 0  3. No medical comorbiditiies    He is doing well and has no complains.  His testosterone is pending from today but was normal/ high at 625 at last visit. His PSA has gone up to 10.2. We had decided that we would restart therapy once he is close to 10. He has crossed the mae. He is willing to restart therapy. He remembers having a lot of difficulty with bicalutamide. I explained rationale to block the effects of testosterone surge and the fact that we could get away without it. He would like to continue with same sequence and is willing to take it. I have prescribed bicalutamide for 2 weeks for him.     His weight and blood pressures are up. He understands the value of exercise and weight loss. He has done triathlons in the past. He did a long section of the [place] trail recently and had fun. He will follow up with his primary care for his elevated blood pressures.      I will again see him in 4 months with labs a week prior to visit including CBC, CMP, PSA, testosterone.    Pavel Norton  ,  Division of Hematology, Oncology & Transplantation  [facility].

## 2019-07-16 ENCOUNTER — OFFICE VISIT (OUTPATIENT)
Dept: FAMILY MEDICINE | Facility: CLINIC | Age: 69
End: 2019-07-16
Payer: COMMERCIAL

## 2019-07-16 VITALS
WEIGHT: 244 LBS | BODY MASS INDEX: 37.11 KG/M2 | DIASTOLIC BLOOD PRESSURE: 84 MMHG | HEART RATE: 92 BPM | SYSTOLIC BLOOD PRESSURE: 136 MMHG | RESPIRATION RATE: 18 BRPM | TEMPERATURE: 98.2 F | OXYGEN SATURATION: 96 %

## 2019-07-16 DIAGNOSIS — I10 UNCONTROLLED HYPERTENSION: ICD-10-CM

## 2019-07-16 DIAGNOSIS — M10.9 ACUTE GOUT OF RIGHT ANKLE, UNSPECIFIED CAUSE: Primary | ICD-10-CM

## 2019-07-16 LAB — URATE SERPL-MCNC: 5.3 MG/DL (ref 3.5–7.2)

## 2019-07-16 PROCEDURE — 36415 COLL VENOUS BLD VENIPUNCTURE: CPT | Performed by: FAMILY MEDICINE

## 2019-07-16 PROCEDURE — 84550 ASSAY OF BLOOD/URIC ACID: CPT | Performed by: FAMILY MEDICINE

## 2019-07-16 PROCEDURE — 99214 OFFICE O/P EST MOD 30 MIN: CPT | Performed by: FAMILY MEDICINE

## 2019-07-16 RX ORDER — PREDNISONE 20 MG/1
TABLET ORAL
Qty: 20 TABLET | Refills: 0 | Status: SHIPPED | OUTPATIENT
Start: 2019-07-16 | End: 2019-09-11

## 2019-07-16 RX ORDER — KETOROLAC TROMETHAMINE 10 MG/1
10 TABLET, FILM COATED ORAL EVERY 6 HOURS PRN
COMMUNITY
End: 2021-01-21

## 2019-07-16 RX ORDER — NAPROXEN 500 MG/1
1 TABLET ORAL 2 TIMES DAILY
Qty: 10 TABLET | Refills: 1 | Status: SHIPPED | OUTPATIENT
Start: 2019-07-16 | End: 2019-08-06

## 2019-07-16 NOTE — PATIENT INSTRUCTIONS
Patient Education     Treating Gout Attacks     Raising the joint above the level of your heart can help reduce gout symptoms.     Gout is a disease that affects the joints. It is caused by excess uric acid in your blood that may lead to crystals forming in your joints. Left untreated, it can lead to painful foot and joint deformities and even kidney problems. But, by treating gout early, you can relieve pain and help prevent future problems. Gout can usually be treated with medicine and proper diet. In severe cases, surgery may be needed.  Gout attacks are painful and often happen more than once. Taking medicines may reduce pain and prevent attacks in the future. There are also some things you can do at home to relieve symptoms.  Medicines for gout  Your healthcare provider may prescribe a daily medicine to reduce levels of uric acid. Reducing your uric acid levels may help prevent gout attacks. Allopurinol is one commonly used medicine taken daily to reduce uric acid levels. Other daily medicines used to reduce uric acid levels include febuxostat, lesinurad, and probencid. Other medicines can help relieve pain and swelling during an acute attack. Medicines such as NSAIDs (nonsteroidal anti-inflammatory medicines), steroids, and colchicine may be prescribed for intermittent use to relieve an acute gout attack. Be sure to take your medicine as directed.  What you can do  Below are some things you can do at home to relieve gout symptoms. Your healthcare provider may have other tips.    Rest the painful joint as much as you can.    Raise the painful joint so it is at a level higher than your heart.    Use ice for 10 minutes every 1 to 2 hours as possible.  How can I prevent gout?  With a little effort, you may be able to prevent gout attacks in the future. Here are some things you can do:    Don't eat foods high in purines  ? Certain meats (red meat, processed meat, turkey)  ? Organ meats (kidney, liver,  sweetbread)  ? Shellfish (lobster, crab, shrimp, scallop, mussel)  ? Certain fish (anchovy, sardine, herring, mackerel)    Take any medicines prescribed by your healthcare provider.    Lose weight if you need to.    Reduce high fructose corn syrup in meals and drinks.    Reduce or cut out alcohol, particularly beer, but also red wine and spirits.    Control blood pressure, diabetes, and cholesterol.    Drink plenty of water to help flush uric acid from your body.  Date Last Reviewed: 4/1/2018 2000-2018 [employer]. [address] [place] [address]. All rights reserved. This information is not intended as a substitute for professional medical care. Always follow your healthcare professional's instructions.

## 2019-07-16 NOTE — PROGRESS NOTES
Subjective     Boaz Acosta is a 69 year old male who presents to clinic today for the following health issues:    HPI   Musculoskeletal problem/pain      Duration: 5 days    Description  Location: right ankle    Intensity:  moderate, severe    Accompanying signs and symptoms: warmth and swelling    History  Previous similar problem: no   Previous evaluation:  none    Precipitating or alleviating factors:  Trauma or overuse: no   Aggravating factors include: standing, walking, climbing stairs and overuse    Therapies tried and outcome: rest/inactivity and allopurinol, ketorolac         BP Readings from Last 3 Encounters:   07/16/19 136/84   06/20/19 148/90   06/20/19 148/90    Wt Readings from Last 3 Encounters:   07/16/19 110.7 kg (244 lb)   06/20/19 114.3 kg (251 lb 15.8 oz)   06/20/19 114.3 kg (252 lb 1 oz)         Patient presents with right foot pain, swelling, redness and pain.  He does have a history of gout.  He was in [place] when symptoms started and took a 2-3 doses of allopurinol of unknown strength as well as oral toradol, which helped.  Last uric acid was elevated.  Pst eats a lot of red meat and drinks a few beers per day.  Of note, patient does have a history of prostate cancer s/p resection with recent diagnosis of metastasis.  He took casodex for 2 weeks and has stopped, also taking leuprolide.     History of elevated blood pressure without diagnosis of hypertension, has been asymptomatic, no medications given, not monitoring sodium or losing weight, he does have a bp cuff at home and bp has consistently been elevated.    Reviewed and updated as needed this visit by Provider  Tobacco  Allergies  Meds  Problems  Med Hx  Surg Hx  Fam Hx         Review of Systems   ROS COMP: Constitutional, HEENT, cardiovascular, pulmonary, gi and gu systems are negative, except as otherwise noted.      Objective    /84   Pulse 92   Temp 98.2  F (36.8  C) (Oral)   Resp 18   Wt 110.7 kg (244 lb)    SpO2 96%   BMI 37.11 kg/m    Body mass index is 37.11 kg/m .  Physical Exam   GENERAL: healthy, alert and no distress  EYES: Eyes grossly normal to inspection, PERRL and conjunctivae and sclerae normal  NECK: no adenopathy, no asymmetry, masses, or scars and thyroid normal to palpation  RESP: lungs clear to auscultation - no rales, rhonchi or wheezes  CV: regular rate and rhythm, normal S1 S2, no S3 or S4, no murmur, click or rub, no peripheral edema and peripheral pulses strong  MS: no gross musculoskeletal defects noted, no edema  SKIN: no suspicious lesions or rashes  PSYCH: mentation appears normal, affect normal/bright          Assessment & Plan       ICD-10-CM    1. Acute gout of right ankle, unspecified cause M10.9 predniSONE (DELTASONE) 20 MG tablet     Uric acid     naproxen (NAPROXEN) 500 MG EC tablet   2. Uncontrolled hypertension I10      Right foot swelling - most likely due to gout flare, will give course of prednisone and naproxen as indomethacin is not covered.  Will check uric acid as well.    HTN - bp log given to patient, will follow-up in 2 weeks with sample size of blood pressures, will decide if needs treatment at that time.     Follow-up in 2 weeks    Return in about 2 weeks (around 7/30/2019) for Hypertension.    Gianluca Singh MD  [facility]

## 2019-07-16 NOTE — LETTER
[address] [place] [address]    July 18, 2019    Boaz Acosta  [address] [facility] [facility] [address]          Dear Boaz,    Normal uric acid level may indicate resolution, however if the prednisone and NSAIDS don't help, we should get an ultrasound of your leg/foot to make sure your swelling isn't due to a blood clot.     Enclosed is a copy of your results.     Results for orders placed or performed in visit on 07/16/19   Uric acid   Result Value Ref Range    Uric Acid 5.3 3.5 - 7.2 mg/dL       If you have any questions or concerns, please call myself or my nurse at [phone].      Sincerely,        Gianluca Powell MD/dustin

## 2019-07-19 ENCOUNTER — MYC MEDICAL ADVICE (OUTPATIENT)
Dept: FAMILY MEDICINE | Facility: CLINIC | Age: 69
End: 2019-07-19

## 2019-08-05 NOTE — PROGRESS NOTES
Subjective     Boaz Acosta is a 69 year old male who presents to clinic today for the following health issues:    HPI   Chief Complaint   Patient presents with     Health Maintenance     Lipid, PHQ2, Fall risk     RECHECK     Gout ; Was seen a couple weeks ago and was prescribed predisone ; helped while on it but then when done with medication it started back up     Patient presents with gout flare of right ankle and foot.  Swelling, slight redness, pain, x 1 week, uric acid was normal last time it was checked.  No systemic signs.  Patient is currently being treated for metastatic prostate cancer.  He has done appropriate diet changes as well.        BP Readings from Last 3 Encounters:   08/06/19 (!) 170/90   07/16/19 136/84   06/20/19 148/90    Wt Readings from Last 3 Encounters:   08/06/19 112.9 kg (249 lb)   07/16/19 110.7 kg (244 lb)   06/20/19 114.3 kg (251 lb 15.8 oz)                      Reviewed and updated as needed this visit by Provider  Tobacco  Allergies  Meds  Problems  Med Hx  Surg Hx  Fam Hx         Review of Systems   ROS COMP: Constitutional, HEENT, cardiovascular, pulmonary, gi and gu systems are negative, except as otherwise noted.      Objective    BP (!) 170/90   Pulse 90   Temp 97.8  F (36.6  C) (Oral)   Resp 18   Wt 112.9 kg (249 lb)   SpO2 95%   BMI 37.87 kg/m    Body mass index is 37.87 kg/m .  Physical Exam   GENERAL: healthy, alert and no distress  EYES: Eyes grossly normal to inspection, PERRL and conjunctivae and sclerae normal  NECK: no adenopathy, no asymmetry, masses, or scars and thyroid normal to palpation  RESP: lungs clear to auscultation - no rales, rhonchi or wheezes  CV: regular rate and rhythm, normal S1 S2, no S3 or S4, no murmur, click or rub, no peripheral edema and peripheral pulses strong  MS: no gross musculoskeletal defects noted, no edema  SKIN: no suspicious lesions or rashes  PSYCH: mentation appears normal, affect normal/bright          Assessment &  Plan       ICD-10-CM    1. Acute gout of right ankle, unspecified cause M10.9 Uric acid     predniSONE (DELTASONE) 20 MG tablet     indomethacin (INDOCIN) 50 MG capsule   2. Elevated blood pressure reading without diagnosis of hypertension R03.0    3. Screening for hyperlipidemia Z13.220 Lipid panel reflex to direct LDL Non-fasting   4. Hyperglycemia R73.9 Hemoglobin A1c     Gout - will check uric acid today, give prednisone taper, indomethacin, continue diet changes.  Medications he's taking for prostate cancer treatment can often lead to gout flares.  Elevated bp - likely due to pain, will re-assess  Historically elevated glucose - will check A1C to rule out diabetes.       Follow-up in 1 month  Gianluca Singh MD  [facility]

## 2019-08-06 ENCOUNTER — OFFICE VISIT (OUTPATIENT)
Dept: FAMILY MEDICINE | Facility: CLINIC | Age: 69
End: 2019-08-06
Payer: COMMERCIAL

## 2019-08-06 VITALS
WEIGHT: 249 LBS | DIASTOLIC BLOOD PRESSURE: 90 MMHG | TEMPERATURE: 97.8 F | BODY MASS INDEX: 37.87 KG/M2 | OXYGEN SATURATION: 95 % | HEART RATE: 90 BPM | SYSTOLIC BLOOD PRESSURE: 170 MMHG | RESPIRATION RATE: 18 BRPM

## 2019-08-06 DIAGNOSIS — M10.9 ACUTE GOUT OF RIGHT ANKLE, UNSPECIFIED CAUSE: Primary | ICD-10-CM

## 2019-08-06 DIAGNOSIS — R03.0 ELEVATED BLOOD PRESSURE READING WITHOUT DIAGNOSIS OF HYPERTENSION: ICD-10-CM

## 2019-08-06 DIAGNOSIS — R73.9 HYPERGLYCEMIA: ICD-10-CM

## 2019-08-06 DIAGNOSIS — Z13.220 SCREENING FOR HYPERLIPIDEMIA: ICD-10-CM

## 2019-08-06 LAB
CHOLEST SERPL-MCNC: 192 MG/DL
HBA1C MFR BLD: 5.6 % (ref 0–5.6)
HDLC SERPL-MCNC: 79 MG/DL
LDLC SERPL CALC-MCNC: 99 MG/DL
NONHDLC SERPL-MCNC: 113 MG/DL
TRIGL SERPL-MCNC: 71 MG/DL
URATE SERPL-MCNC: 5 MG/DL (ref 3.5–7.2)

## 2019-08-06 PROCEDURE — 84550 ASSAY OF BLOOD/URIC ACID: CPT | Performed by: FAMILY MEDICINE

## 2019-08-06 PROCEDURE — 83036 HEMOGLOBIN GLYCOSYLATED A1C: CPT | Performed by: FAMILY MEDICINE

## 2019-08-06 PROCEDURE — 36415 COLL VENOUS BLD VENIPUNCTURE: CPT | Performed by: FAMILY MEDICINE

## 2019-08-06 PROCEDURE — 99214 OFFICE O/P EST MOD 30 MIN: CPT | Performed by: FAMILY MEDICINE

## 2019-08-06 PROCEDURE — 80061 LIPID PANEL: CPT | Performed by: FAMILY MEDICINE

## 2019-08-06 RX ORDER — PREDNISONE 20 MG/1
TABLET ORAL
Qty: 20 TABLET | Refills: 0 | Status: SHIPPED | OUTPATIENT
Start: 2019-08-06 | End: 2019-09-11

## 2019-08-06 RX ORDER — INDOMETHACIN 50 MG/1
50 CAPSULE ORAL 2 TIMES DAILY WITH MEALS
Qty: 60 CAPSULE | Refills: 1 | Status: SHIPPED | OUTPATIENT
Start: 2019-08-06 | End: 2020-06-26

## 2019-08-06 NOTE — LETTER
[address] [place] [address]    August 8, 2019    Boaz Acosta  [address] [facility] [id number]          Dear Boaz,  Glad to hear you're doing better.  Again, your uric acid is low, which can be attributed to your diet changes.  Your cholesterol panel looks great.  Let's watch and see how your joint swelling progresses.   Enclosed is a copy of your results.     Results for orders placed or performed in visit on 08/06/19   Uric acid   Result Value Ref Range    Uric Acid 5.0 3.5 - 7.2 mg/dL   Lipid panel reflex to direct LDL Non-fasting   Result Value Ref Range    Cholesterol 192 <200 mg/dL    Triglycerides 71 <150 mg/dL    HDL Cholesterol 79 >39 mg/dL    LDL Cholesterol Calculated 99 <100 mg/dL    Non HDL Cholesterol 113 <130 mg/dL   Hemoglobin A1c   Result Value Ref Range    Hemoglobin A1C 5.6 0 - 5.6 %       If you have any questions or concerns, please call myself or my nurse at [phone].      Sincerely,        Gianluca Powell MD/pb

## 2019-08-06 NOTE — LETTER
[address] [place] [address]    August 7, 2019    Boaz Acosta  [address] [facility] [id number]          Dear Boaz,    Your A1C is normal.     Enclosed is a copy of your results.     Results for orders placed or performed in visit on 08/06/19   Uric acid   Result Value Ref Range    Uric Acid 5.0 3.5 - 7.2 mg/dL   Lipid panel reflex to direct LDL Non-fasting   Result Value Ref Range    Cholesterol 192 <200 mg/dL    Triglycerides 71 <150 mg/dL    HDL Cholesterol 79 >39 mg/dL    LDL Cholesterol Calculated 99 <100 mg/dL    Non HDL Cholesterol 113 <130 mg/dL   Hemoglobin A1c   Result Value Ref Range    Hemoglobin A1C 5.6 0 - 5.6 %       If you have any questions or concerns, please call myself or my nurse at [phone].      Sincerely,        Gianluca Powell MD/dustin

## 2019-08-08 ENCOUNTER — TELEPHONE (OUTPATIENT)
Dept: FAMILY MEDICINE | Facility: CLINIC | Age: 69
End: 2019-08-08

## 2019-08-08 NOTE — TELEPHONE ENCOUNTER
Panel Management Review      Patient has the following on his problem list:     Hypertension   Last three blood pressure readings:  BP Readings from Last 3 Encounters:   08/06/19 (!) 170/90   07/16/19 136/84   06/20/19 148/90     Blood pressure: FAILED    HTN Guidelines:  Less than 140/90      Composite cancer screening  Chart review shows that this patient is due/due soon for the following None  Summary:    Patient is due/failing the following:   BP CHECK    Action needed:   Routed to provider for review.    Type of outreach:    Will send Appbistrot message when BP follow up time frame is informed    Questions for provider review:    When should he come back for a BP recheck? Should it be OV or nurse only? Please advise.                                                                                                                                    Gala Simmons MA       Chart routed to Provider .

## 2019-09-11 ENCOUNTER — OFFICE VISIT (OUTPATIENT)
Dept: FAMILY MEDICINE | Facility: CLINIC | Age: 69
End: 2019-09-11
Payer: COMMERCIAL

## 2019-09-11 VITALS
RESPIRATION RATE: 16 BRPM | DIASTOLIC BLOOD PRESSURE: 88 MMHG | HEIGHT: 68 IN | WEIGHT: 255 LBS | SYSTOLIC BLOOD PRESSURE: 156 MMHG | OXYGEN SATURATION: 98 % | BODY MASS INDEX: 38.65 KG/M2 | TEMPERATURE: 99.3 F | HEART RATE: 69 BPM

## 2019-09-11 DIAGNOSIS — E66.01 MORBID OBESITY (H): ICD-10-CM

## 2019-09-11 DIAGNOSIS — M10.9 ACUTE GOUT OF RIGHT ANKLE, UNSPECIFIED CAUSE: Primary | ICD-10-CM

## 2019-09-11 PROCEDURE — 99213 OFFICE O/P EST LOW 20 MIN: CPT | Performed by: PHYSICIAN ASSISTANT

## 2019-09-11 RX ORDER — PREDNISONE 20 MG/1
TABLET ORAL
Qty: 20 TABLET | Refills: 0 | Status: SHIPPED | OUTPATIENT
Start: 2019-09-11 | End: 2021-01-21

## 2019-09-11 ASSESSMENT — MIFFLIN-ST. JEOR: SCORE: 1901.01

## 2019-09-11 ASSESSMENT — PAIN SCALES - GENERAL: PAINLEVEL: MODERATE PAIN (4)

## 2019-09-11 NOTE — PROGRESS NOTES
"Subjective     Boaz Acosta is a 69 year old male who presents to clinic today for the following health issues:    HPI   Gout/ single inflamed joint   Onset: 2 months ago has had 3 flares, since starting lupron for prostate cancer, but recurred 5 days ago    Description:   Location: big toe, foot, leg and PRIMARILY ankle - right  Joint Swelling: YES  Redness: YES  Pain: YES    Intensity: severe    Progression of Symptoms:  worsening    Accompanying Signs & Symptoms:  Fevers: no     History:   Trauma to the area: no   Previous history of gout: YES   Recent illness:  YES- stage 4 cancer    Precipitating factors:   Diet-rich in purine: no  Alcohol use: no   Diuretic use: no     Alleviating factors:  Prednisone    Therapies Tried and outcome: Prednisone and indomethacin: effective  Uric acids this year have been normal.      Has PRIMARY CARE PROVIDER appt next week and onc end of October.      Weight is stable        Past Medical History:   Diagnosis Date     Elevated prostate specific antigen (PSA) 5/30/2014     History of blood transfusion      Hypertension several years    mainly with mechanical testing     Lyme disease     2 times     Prostate cancer (H) 10/24/2014    Prostate resection     SCC (squamous cell carcinoma), face 12/12/2012     History   Smoking Status     Former Smoker     Years: 3.00     Types: Cigars   Smokeless Tobacco     Never Used     Comment: Only occassional cigars       ROS:  GEN no fevers  SKIN no erythema  Musculoskeletal:  See HPI.      OBJECTIVE:  Blood pressure (!) 156/88, pulse 69, temperature 99.3  F (37.4  C), temperature source Oral, resp. rate 16, height 1.727 m (5' 7.99\"), weight 115.7 kg (255 lb), SpO2 98 %.  Patient is alert and NAD.  EYES: conjunctiva clear  Ankle Exam (right):  Inspection:swelling and erythema around the lateral malleolus  swelling  and erythema around medial malleolus  Palpation:tender over malleoli  Cap refill intact.    Good doralis pedis.  Neurovascularly " Intact Distally.          ASSESSMENT:    ICD-10-CM    1. Acute gout of right ankle, unspecified cause M10.9 predniSONE (DELTASONE) 20 MG tablet   2. Morbid obesity (H) E66.01          PLAN:  F/u PRIMARY CARE PROVIDER next week for BP recheck. Elevated blood pressure plan discussed with patient who expressed understanding.      Ice, elevate, lowly increase activity level with active range of motion exercises encouraged.    Agapito Wright PA-C

## 2019-09-11 NOTE — PATIENT INSTRUCTIONS
At [facility], we strive to deliver an exceptional experience to you, every time we see you.  If you receive a survey in the mail, please send us back your thoughts. We really do value your feedback.    Based on your medical history, these are the current health maintenance/preventive care services that you are due for (some may have been done at this visit.)  Health Maintenance Due   Topic Date Due     HEPATITIS C SCREENING  1950     ADVANCE CARE PLANNING  1950     ZOSTER IMMUNIZATION (1 of 2) 07/14/2001     MEDICARE ANNUAL WELLNESS VISIT  07/14/2016     PNEUMOCOCCAL IMMUNIZATION 65+ LOW/MEDIUM RISK (1 of 2 - PCV13) 07/14/2016     FALL RISK ASSESSMENT  10/30/2018     INFLUENZA VACCINE (1) 09/01/2019         Suggested websites for health information:  Www.Network18.Play With Pictures / HangPic : Up to date and easily searchable information on multiple topics.  Www.Clinicbook.gov : medication info, interactive tutorials, watch real surgeries online  Www.familydoctor.org : good info from the Academy of Family Physicians  Www.cdc.gov : public health info, travel advisories, epidemics (H1N1)  Www.aap.org : children's health info, normal development, vaccinations  Www.health.[facility].[place].us : [place] dept of health, public health issues in [place], N1N1    Your care team:                            Family Medicine Internal Medicine   MD Bryant Garcia MD Shantel Branch-Fleming, MD Katya Georgiev PA-C Nam Ho, MD Pediatrics   KADIE Caldwell, MD Roseanna Rios CNP, MD Deborah Mielke, MD Kim Thein, APRISABELLA CNP      Clinic hours: Monday - Thursday 7 am-7 pm; Fridays 7 am-5 pm.   Urgent care: Monday - Friday 11 am-9 pm; Saturday and Sunday 9 am-5 pm.  Pharmacy : Monday -Thursday 8 am-8 pm; Friday 8 am-6 pm; Saturday and Sunday 9 am-5 pm.     Clinic: [phone]   Pharmacy: [phone]    Patient Education     Treating Gout Attacks      Raising the joint above the level of your heart can help reduce gout symptoms.     Gout is a disease that affects the joints. It is caused by excess uric acid in your blood that may lead to crystals forming in your joints. Left untreated, it can lead to painful foot and joint deformities and even kidney problems. But, by treating gout early, you can relieve pain and help prevent future problems. Gout can usually be treated with medicine and proper diet. In severe cases, surgery may be needed.  Gout attacks are painful and often happen more than once. Taking medicines may reduce pain and prevent attacks in the future. There are also some things you can do at home to relieve symptoms.  Medicines for gout  Your healthcare provider may prescribe a daily medicine to reduce levels of uric acid. Reducing your uric acid levels may help prevent gout attacks. Allopurinol is one commonly used medicine taken daily to reduce uric acid levels. Other daily medicines used to reduce uric acid levels include febuxostat, lesinurad, and probencid. Other medicines can help relieve pain and swelling during an acute attack. Medicines such as NSAIDs (nonsteroidal anti-inflammatory medicines), steroids, and colchicine may be prescribed for intermittent use to relieve an acute gout attack. Be sure to take your medicine as directed.  What you can do  Below are some things you can do at home to relieve gout symptoms. Your healthcare provider may have other tips.    Rest the painful joint as much as you can.    Raise the painful joint so it is at a level higher than your heart.    Use ice for 10 minutes every 1 to 2 hours as possible.  How can I prevent gout?  With a little effort, you may be able to prevent gout attacks in the future. Here are some things you can do:    Don't eat foods high in purines  ? Certain meats (red meat, processed meat, turkey)  ? Organ meats (kidney, liver, sweetbread)  ? Shellfish (lobster, crab, shrimp, scallop,  mussel)  ? Certain fish (anchovy, sardine, herring, mackerel)    Take any medicines prescribed by your healthcare provider.    Lose weight if you need to.    Reduce high fructose corn syrup in meals and drinks.    Reduce or cut out alcohol, particularly beer, but also red wine and spirits.    Control blood pressure, diabetes, and cholesterol.    Drink plenty of water to help flush uric acid from your body.  Date Last Reviewed: 4/1/2018 2000-2018 [employer]. [address] [place] [address]. All rights reserved. This information is not intended as a substitute for professional medical care. Always follow your healthcare professional's instructions.           Patient Education     Gout Diet  Gout is a painful condition caused by an excess of uric acid, a waste product made by the body. Uric acid forms crystals that collect in the joints. The immune response to these crystals brings on symptoms of joint pain and swelling. This is called a gout attack. Often, medications and diet changes are combined to manage gout. Below are some guidelines for changing your diet to help you manage gout and prevent attacks. Your healthcare provider will help you determine the best eating plan for you.  Eating to manage gout  Weight loss for those who are overweight may help reduce gout attacks.  Eat less of these foods  Eating too many foods containing purines may raise the levels of uric acid in your body. This raises your risk for a gout attack. Try to limit these foods and drinks:    Alcohol, such as beer and red wine. You may be told to avoid alcohol completely.    Soft drinks that contain sugar or high fructose corn syrup    Certain fish, including anchovies, sardines, fish eggs, and herring    Shellfish    Certain meats, such as red meat, hot dogs, luncheon meats, and turkey    Organ meats, such as liver, kidneys, and sweetbreads    Legumes, such as dried beans and peas    Other high fat foods such as  gravy, whole milk, and high fat cheeses    Vegetables such as asparagus, cauliflower, spinach, and mushrooms used to be thought to contribute to an increased risk for a gout attack, but recent studies show that high purine vegetables don't increase the risk for a gout attack.  Eat more of these foods  Other foods may be helpful for people with gout. Add some of these foods to your diet:    Cherries contain chemicals that may lower uric acid.    Omega fatty acids. These are found in some fatty fish such as salmon, certain oils (flax, olive, or nut), and nuts themselves. Omega fatty acids may help prevent inflammation due to gout.    Dairy products that are low-fat or fat-free, such as cheese and yogurt    Complex carbohydrate foods, including whole grains, brown rice, oats, and beans    Coffee, in moderation    Water, approximately 64 ounces per day  Follow-up care  Follow up with your healthcare provider as advised.  When to seek medical advice  Call your healthcare provider right away if any of these occur:    Return of gout symptoms, usually at night:    Severe pain, swelling, and heat in a joint, especially the base of the big toe    Affected joint is hard to move    Skin of the affected joint is purple or red    Fever of 100.4 F (38 C) or higher    Pain that doesn't get better even with prescribed medicine   Date Last Reviewed: 6/1/2018 2000-2018 [employer]. [address] [place] [address], [place] [address]. All rights reserved. This information is not intended as a substitute for professional medical care. Always follow your healthcare professional's instructions.

## 2019-09-17 ENCOUNTER — OFFICE VISIT (OUTPATIENT)
Dept: FAMILY MEDICINE | Facility: CLINIC | Age: 69
End: 2019-09-17
Payer: COMMERCIAL

## 2019-09-17 VITALS
HEART RATE: 85 BPM | OXYGEN SATURATION: 98 % | WEIGHT: 253 LBS | SYSTOLIC BLOOD PRESSURE: 154 MMHG | RESPIRATION RATE: 16 BRPM | BODY MASS INDEX: 38.48 KG/M2 | TEMPERATURE: 98.8 F | DIASTOLIC BLOOD PRESSURE: 92 MMHG

## 2019-09-17 DIAGNOSIS — M1A.0710 IDIOPATHIC CHRONIC GOUT OF RIGHT FOOT WITHOUT TOPHUS: Primary | ICD-10-CM

## 2019-09-17 DIAGNOSIS — C61 PROSTATE CANCER (H): Chronic | ICD-10-CM

## 2019-09-17 DIAGNOSIS — I10 HYPERTENSION GOAL BP (BLOOD PRESSURE) < 140/90: Chronic | ICD-10-CM

## 2019-09-17 PROCEDURE — 99214 OFFICE O/P EST MOD 30 MIN: CPT | Performed by: FAMILY MEDICINE

## 2019-09-17 PROCEDURE — 99207 C PAF COMPLETED  NO CHARGE: CPT | Performed by: FAMILY MEDICINE

## 2019-09-17 RX ORDER — PREDNISONE 10 MG/1
TABLET ORAL
Refills: 0 | COMMUNITY
Start: 2019-09-11 | End: 2021-04-21

## 2019-09-17 RX ORDER — NAPROXEN SODIUM 220 MG
220 TABLET ORAL 2 TIMES DAILY WITH MEALS
COMMUNITY
End: 2021-11-08

## 2019-09-17 NOTE — PROGRESS NOTES
Subjective     Boaz Acosta is a 69 year old male who presents to clinic today for the following health issues:    HPI   Gout  Duration: ongoing this episode 7-8 days  Description   Location: foot and ankle - right  Joint Swelling: YES- but has gotten better  Redness: YES  Pain intensity:  moderate  Accompanying signs and symptoms: None  History  Previous history of gout: YES   Trauma to the area: no   Precipitating factors:   Alcohol usage: Occassional  Diuretic use:   Recent illness: no   Therapies tried and outcome: Prednisone - currently taking    Patient had gout flare recently, usually improves with prednisone and indomethacin  History of prostate cancer with chemo treatment which is likely contributing    BP Readings from Last 3 Encounters:   09/17/19 (!) 154/92   09/11/19 (!) 156/88   08/06/19 (!) 170/90    Wt Readings from Last 3 Encounters:   09/17/19 114.8 kg (253 lb)   09/11/19 115.7 kg (255 lb)   08/06/19 112.9 kg (249 lb)            Reviewed and updated as needed this visit by Provider  Tobacco  Allergies  Meds  Problems  Med Hx  Surg Hx  Fam Hx         Review of Systems   ROS COMP: Constitutional, HEENT, cardiovascular, pulmonary, gi and gu systems are negative, except as otherwise noted.      Objective    BP (!) 154/92   Pulse 85   Temp 98.8  F (37.1  C) (Oral)   Resp 16   Wt 114.8 kg (253 lb)   SpO2 98%   BMI 38.48 kg/m    Body mass index is 38.48 kg/m .  Physical Exam   GENERAL: healthy, alert and no distress  EYES: Eyes grossly normal to inspection, PERRL and conjunctivae and sclerae normal  NECK: no adenopathy, no asymmetry, masses, or scars and thyroid normal to palpation  MS: no gross musculoskeletal defects noted, no edema  SKIN: no suspicious lesions or rashes  PSYCH: mentation appears normal, affect normal/bright          Assessment & Plan       ICD-10-CM    1. 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 Hypertension goal BP (blood pressure) < 140/90 I10     3. Prostate cancer (H) C61         Discussed preventive treatment  Patient will come in for lab visit when gout symptoms return, take prednisone/indomethacin if symptoms return.  He has taken medications in the past for prostate cancer that increase likelihood of gout recurrence.  Will discuss persist elevation of blood pressure at follow-up visit.  Patient wishes to continue with natural treatments.      A total of 30 minutes spent face-to-face with greater than 50% of the time spent in counseling and coordinating cares of the issues above   Follow-up in 1-3 months  Gianluca Singh MD  [facility]

## 2019-09-25 PROBLEM — M1A.0710 IDIOPATHIC CHRONIC GOUT OF RIGHT FOOT WITHOUT TOPHUS: Status: ACTIVE | Noted: 2019-09-25

## 2019-10-01 ENCOUNTER — HEALTH MAINTENANCE LETTER (OUTPATIENT)
Age: 69
End: 2019-10-01

## 2019-10-28 DIAGNOSIS — C61 PROSTATE CANCER (H): Primary | Chronic | ICD-10-CM

## 2019-10-28 DIAGNOSIS — C61 PROSTATE CANCER (H): Chronic | ICD-10-CM

## 2019-10-28 LAB
ALBUMIN SERPL-MCNC: 3.8 G/DL (ref 3.4–5)
ALP SERPL-CCNC: 63 U/L (ref 40–150)
ALT SERPL W P-5'-P-CCNC: 72 U/L (ref 0–70)
ANION GAP SERPL CALCULATED.3IONS-SCNC: 4 MMOL/L (ref 3–14)
AST SERPL W P-5'-P-CCNC: 52 U/L (ref 0–45)
BASOPHILS # BLD AUTO: 0.1 10E9/L (ref 0–0.2)
BASOPHILS NFR BLD AUTO: 0.9 %
BILIRUB SERPL-MCNC: 0.3 MG/DL (ref 0.2–1.3)
BUN SERPL-MCNC: 19 MG/DL (ref 7–30)
CALCIUM SERPL-MCNC: 8.9 MG/DL (ref 8.5–10.1)
CHLORIDE SERPL-SCNC: 107 MMOL/L (ref 94–109)
CO2 SERPL-SCNC: 30 MMOL/L (ref 20–32)
CREAT SERPL-MCNC: 0.86 MG/DL (ref 0.66–1.25)
DIFFERENTIAL METHOD BLD: ABNORMAL
EOSINOPHIL # BLD AUTO: 0.2 10E9/L (ref 0–0.7)
EOSINOPHIL NFR BLD AUTO: 3.8 %
ERYTHROCYTE [DISTWIDTH] IN BLOOD BY AUTOMATED COUNT: 13.4 % (ref 10–15)
GFR SERPL CREATININE-BSD FRML MDRD: 89 ML/MIN/{1.73_M2}
GLUCOSE SERPL-MCNC: 128 MG/DL (ref 70–99)
HCT VFR BLD AUTO: 39.4 % (ref 40–53)
HGB BLD-MCNC: 12.8 G/DL (ref 13.3–17.7)
IMM GRANULOCYTES # BLD: 0 10E9/L (ref 0–0.4)
IMM GRANULOCYTES NFR BLD: 0.3 %
LYMPHOCYTES # BLD AUTO: 2.6 10E9/L (ref 0.8–5.3)
LYMPHOCYTES NFR BLD AUTO: 41.1 %
MCH RBC QN AUTO: 31.5 PG (ref 26.5–33)
MCHC RBC AUTO-ENTMCNC: 32.5 G/DL (ref 31.5–36.5)
MCV RBC AUTO: 97 FL (ref 78–100)
MONOCYTES # BLD AUTO: 0.7 10E9/L (ref 0–1.3)
MONOCYTES NFR BLD AUTO: 10.8 %
NEUTROPHILS # BLD AUTO: 2.7 10E9/L (ref 1.6–8.3)
NEUTROPHILS NFR BLD AUTO: 43.1 %
PLATELET # BLD AUTO: 223 10E9/L (ref 150–450)
POTASSIUM SERPL-SCNC: 3.9 MMOL/L (ref 3.4–5.3)
PROT SERPL-MCNC: 7.2 G/DL (ref 6.8–8.8)
PSA SERPL-MCNC: 0.13 UG/L (ref 0–4)
RBC # BLD AUTO: 4.06 10E12/L (ref 4.4–5.9)
SODIUM SERPL-SCNC: 141 MMOL/L (ref 133–144)
WBC # BLD AUTO: 6.4 10E9/L (ref 4–11)

## 2019-10-28 PROCEDURE — 80053 COMPREHEN METABOLIC PANEL: CPT | Performed by: INTERNAL MEDICINE

## 2019-10-28 PROCEDURE — 85025 COMPLETE CBC W/AUTO DIFF WBC: CPT | Performed by: INTERNAL MEDICINE

## 2019-10-28 PROCEDURE — 36415 COLL VENOUS BLD VENIPUNCTURE: CPT | Performed by: INTERNAL MEDICINE

## 2019-10-28 PROCEDURE — 84153 ASSAY OF PSA TOTAL: CPT | Performed by: NURSE PRACTITIONER

## 2019-10-31 ENCOUNTER — ONCOLOGY VISIT (OUTPATIENT)
Dept: ONCOLOGY | Facility: CLINIC | Age: 69
End: 2019-10-31
Attending: INTERNAL MEDICINE
Payer: COMMERCIAL

## 2019-10-31 VITALS
HEIGHT: 68 IN | DIASTOLIC BLOOD PRESSURE: 92 MMHG | TEMPERATURE: 98.8 F | HEART RATE: 59 BPM | OXYGEN SATURATION: 97 % | RESPIRATION RATE: 18 BRPM | WEIGHT: 252.7 LBS | BODY MASS INDEX: 38.3 KG/M2 | SYSTOLIC BLOOD PRESSURE: 195 MMHG

## 2019-10-31 DIAGNOSIS — C61 PROSTATE CANCER (H): Primary | ICD-10-CM

## 2019-10-31 DIAGNOSIS — C79.82 METASTATIC ADENOCARCINOMA TO PROSTATE (H): ICD-10-CM

## 2019-10-31 PROCEDURE — 99214 OFFICE O/P EST MOD 30 MIN: CPT | Performed by: INTERNAL MEDICINE

## 2019-10-31 ASSESSMENT — PAIN SCALES - GENERAL: PAINLEVEL: NO PAIN (0)

## 2019-10-31 ASSESSMENT — MIFFLIN-ST. JEOR: SCORE: 1890.61

## 2019-10-31 NOTE — NURSING NOTE
"Oncology Rooming Note    October 31, 2019 4:19 PM   Boaz Acosta is a 69 year old male who presents for:    Chief Complaint   Patient presents with     Oncology Clinic Visit     4 mon f/u     Initial Vitals: BP (!) 195/92 (BP Location: Left arm)   Pulse 59   Temp 98.8  F (37.1  C) (Oral)   Resp 18   Ht 1.727 m (5' 7.99\")   Wt 114.6 kg (252 lb 11.2 oz)   SpO2 97%   BMI 38.43 kg/m   Estimated body mass index is 38.43 kg/m  as calculated from the following:    Height as of this encounter: 1.727 m (5' 7.99\").    Weight as of this encounter: 114.6 kg (252 lb 11.2 oz). Body surface area is 2.34 meters squared.  No Pain (0) Comment: Data Unavailable   No LMP for male patient.  Allergies reviewed: Yes  Medications reviewed: Yes    Medications: Medication refills not needed today.  Pharmacy name entered into [facility]:    [id number] [place], [place] [facility] [address] [facility]  [id number] [place], [place] [facility] [address] [facility]    Judi Cortez LPN              "

## 2019-10-31 NOTE — PROGRESS NOTES
[facility] CANCER CLINIC  FOLLOW-UP VISIT NOTE    PATIENT NAME: Boaz Acosta MRN # [id number]  DATE OF VISIT: Oct 31, 2019 YOB: 1950    REFERRING PROVIDER: Rocío Boss MD  [address] [address]    CANCER TYPE: Prostate adenocarcinoma  STAGE: IV  ECOG PS: 0    TREATMENT SUMMARY:  Boaz's annual screening PSA was noted to be elevated at 17 in 2012, and 21 in the following year and it was attributed to prostatitis. Eventually, he did change his primary care physician and his repeat PSA was noted to be elevated at 31 in 04/2014. He was then referred to Urology, and was seen by Dr. Pressley with Urology in 07/2014. He was worked up with a biopsy of the prostate on 08/01//2014, which revealed adenocarcinoma of the prostate with Willis score of 4+3 involving 12 of the 12 cores. He was staged with a PET/CT scan, which did not show any evidence of distant metastasis. There was evidence of increased FDG uptake throughout the prostate with a maximum SUV of 7.1. He had a bone scan done on 08/19/2014, which did not show any obvious evidence of metastasis. Mr. Acosta underwent a robotic-assisted radical prostatectomy and lymph node sampling performed by Dr. Hesham Prieto on 10/24/2014. The pathology from this revealed adenocarcinoma of the prostate with a [place] of 7 and a tertiary pattern of 5. There was extensive prostatic tissue involvement with over 75% of the tissue with evidence of disease. There was extensive extraprostatic extension involving the apex, right and left anterior and posterior base, and soft tissue around both seminal vesicles. There was bilateral seminal vesicle invasion with involvement of vas deferens. Margins were involved with bladder neck and bilateral posterior seminal vesicle soft tissue apex. There was evidence of perineural invasion. The lymph node sample was negative for disease. His postoperative PSA  in 12/2014 remained elevated at 7.1, and all the subsequent PSAs have remained positive. His restaging scans done in January and again in 03/2015, including a CT abdomen and pelvis and bone scans, have been negative. He was referred to Radiation Oncology for consideration of salvage radiation therapy, and was seen by Dr. Murphy on 03/24/2015. Dr. Murphy referred him to Dr. Zbigniew Chu at the [facility] to have a choline-11 PET/CT scan done. The choline-11 scan done at [place] demonstrated evidence of 2 retroperitoneal lymph nodes that are mildly positive for uptake. There was no abnormal uptake in the prostate bed to suggest local recurrence. He was started on bicalutamide, and received his first dose of Lupron on 05/12/2015.   He comes for a scheduled follow up visit    Oncology Supportive Medications 5/12/2015 9/2/2015 1/4/2016   leuprolide (LUPRON DEPOT) IM 30 mg 30 mg 30 mg     CURRENT TREATMENT  Break period of intermittent androgen deprivation therapy    RACHELLE Sandra comes for a scheduled follow up visit on androgen ablation therapy.     He developed gout after starting bicalutamide. It was very painful for him. His symptoms almost suddenly resolved after he received prednisone within hours. He is off prednisone and received it only for 5 days.       PAST MEDICAL HISTORY   1. Prostate cancer with disease metastatic to para-aortic nodes   2. Borderline HTN  3. ADALID an CPAP at night  4. GERD      CURRENT OUTPATIENT MEDICATIONS     Current Outpatient Prescriptions   Medication Sig Dispense Refill     omeprazole 20 MG tablet Take 1 tablet (20 mg) by mouth daily  30 tablet 1     ascorbic acid (VITAMIN C) 1000 MG TABS Take 1-2 tablets by mouth as needed        Multiple Vitamin (DAILY MULTIVITAMIN PO) Take 1 tablet by mouth daily.          ALLERGIES   No Known Allergies     REVIEW OF SYSTEMS   As above in the HPI, o/w complete 12-point ROS was negative.     PHYSICAL EXAM   BP (!) 195/92 (BP Location: Left arm)   Pulse  "59   Temp 98.8  F (37.1  C) (Oral)   Resp 18   Ht 1.727 m (5' 7.99\")   Wt 114.6 kg (252 lb 11.2 oz)   SpO2 97%   BMI 38.43 kg/m      SpO2 Readings from Last 4 Encounters:   06/18/18 96%   10/30/17 97%   10/16/17 97%   06/19/17 96%     Wt Readings from Last 3 Encounters:   09/17/19 114.8 kg (253 lb)   09/11/19 115.7 kg (255 lb)   08/06/19 112.9 kg (249 lb)     GEN: NAD  HEENT: PERRL, EOMI, no icterus, injection or pallor. Oropharynx is clear.  NECK: no cervical or supraclavicular lymphadenopathy  LUNGS: clear bilaterally  CV: regular, no murmurs, rubs, or gallops  ABDOMEN: soft, non-tender, non-distended, normal bowel sounds, no hepatosplenomegaly by percussion or palpation  EXT: warm, well perfused, no edema  NEURO: alert  SKIN: lesion in subxiphoid (epigastrium) as below     LABORATORY AND IMAGING STUDIES     Recent Labs   Lab Test 10/28/19  0853 06/17/19  0741 02/18/19  0759 10/22/18  0744 06/11/18  0742    142 140 139 142   POTASSIUM 3.9 4.1 4.1 4.2 4.2   CHLORIDE 107 108 107 105 107   CO2 30 28 26 28 26   ANIONGAP 4 6 7 6 9   BUN 19 21 17 17 17   CR 0.86 1.00 1.02 1.02 1.10   * 123* 100* 120* 117*   KERRY 8.9 8.7 9.0 8.6 9.2     No results for input(s): MAG, PHOS in the last 24005 hours.  Recent Labs   Lab Test 10/28/19  0853 06/17/19  0741 02/18/19  0759 10/22/18  0744 06/11/18  0742   WBC 6.4 6.3 6.7 5.9 5.2   HGB 12.8* 14.4 14.6 14.8 14.0    206 225 215 222   MCV 97 97 94 94 96   NEUTROPHIL 43.1 50.3 50.5 50.9 46.0     Recent Labs   Lab Test 10/28/19  0853 06/17/19  0741 02/18/19  0759  06/11/18  0742 02/12/18  0748 10/09/17  0750   BILITOTAL 0.3 0.4 0.6   < > 0.3 0.5 0.5   ALKPHOS 63 63 57   < > 47 44 51   ALT 72* 60 121*   < > 42 49 46   AST 52* 43 76*   < > 33 38 32   ALBUMIN 3.8 3.5 3.7   < > 3.4 3.7 3.5   LDH  --   --   --   --  204 197 204    < > = values in this interval not displayed.     TSH   Date Value Ref Range Status   03/14/2005 2.22 0.4 - 5.0 mU/L Final     No results " for input(s): CEA in the last 56149 hours.  Results for orders placed or performed in visit on 06/18/15   US Scrotum and Testicles    Narrative    Examination: Testicular ultrasound 6/18/2015 6:16 PM     Comparison: None.    History: Metastatic prostate cancer and left testicular mass.     Findings: There is homogeneous normal echotexture throughout the  bilateral testes. The left testicle measures 4.9 x 3.2 x 1.9 cm. The  left epididymal head measures 1.2 x 1.0 x 1.0 cm. The right testicle  measures 4.9 x 3.2 x 1.7 cm. The right epididymal head measures 1.3 x  0.7 x 1.1 cm. Doppler evaluation shows normal arterial waveforms to  the testes bilaterally. There is no hydrocele or varicocele. There is  no mass or abnormal calcifications within the testicles. Prominent  rete testes in the left testicle.     In the region of the left epididymal tail where the patient indicates  the palpable lump, there is a round nodule measuring 0.5 x 0.6 x 0.6  cm with a hyperechoic rim. The nodule is not hypervascular.      Impression    Impression: 0.6 cm round nodule with hyperechoic rim in the region of  the left epididymal tail. This may represent a thrombosed varicocele.  The nodule is extratesticular and therefore malignancy is highly  unlikely.    I have personally reviewed the examination and initial interpretation  and I agree with the findings.    MIKE QUACH MD     Recent Labs   Lab Test 10/28/19  0853 06/17/19  0741 02/18/19  0759 10/22/18  0744 06/11/18  0742 02/12/18  0748 10/09/17  0750   PSA 0.13 10.20* 6.42* 6.68* 5.56* 3.43 1.70   TESTOSTTOTAL  --   --  625 611 523 555 654      ASSESSMENT AND PLAN   1. Prostate cancer with metastasis to retroperitoneal nodes with persistent PSA elevation post prostatectomy; ish 4+3 disease, tertiary score of 5  2. ECOG PS 0  3. No medical comorbiditiies    He is doing well and has no complains.  His testosterone is pending from today. His PSA had gone up to 10.2 at last  visit and we treated him with leuprolide. He is off therapy now. His PSA has nicely declined to 0.13 ng/ml. We had decided that we would restart therapy once he is close to 10.  He remembers having a lot of difficulty with bicalutamide previously and again had an episode of gout. I explained rationale to block the effects of testosterone surge and the fact that we could get away without it. Bicalutamide is associated with 2 to less than 5% incidence of gout. I have never previously had a patient who had gout on this agent but he had it and it is well described.     His weight and blood pressures are up. He understands the value of exercise and weight loss. He will follow up with his primary care for his elevated blood pressures.      I will again see him in 6 months with labs a week prior to visit including CBC, CMP, PSA, testosterone.    Pavel Norton  ,  Division of Hematology, Oncology & Transplantation  [facility].

## 2019-10-31 NOTE — LETTER
10/31/2019         RE: Boaz Acosta  [address] [address]        Dear Colleague,    Thank you for referring your patient, Boaz Acosta, to the [facility]. Please see a copy of my visit note below.    [facility] CANCER CLINIC  FOLLOW-UP VISIT NOTE    PATIENT NAME: Boaz Acosta MRN # [id number]  DATE OF VISIT: Oct 31, 2019 YOB: 1950    REFERRING PROVIDER: Rocío Boss MD  [facility]  [address] [place] [address]    CANCER TYPE: Prostate adenocarcinoma  STAGE: IV  ECOG PS: 0    TREATMENT SUMMARY:  Boaz's annual screening PSA was noted to be elevated at 17 in 2012, and 21 in the following year and it was attributed to prostatitis. Eventually, he did change his primary care physician and his repeat PSA was noted to be elevated at 31 in 04/2014. He was then referred to Urology, and was seen by Dr. Pressley with Urology in 07/2014. He was worked up with a biopsy of the prostate on 08/01//2014, which revealed adenocarcinoma of the prostate with Willis score of 4+3 involving 12 of the 12 cores. He was staged with a PET/CT scan, which did not show any evidence of distant metastasis. There was evidence of increased FDG uptake throughout the prostate with a maximum SUV of 7.1. He had a bone scan done on 08/19/2014, which did not show any obvious evidence of metastasis. Mr. Acosta underwent a robotic-assisted radical prostatectomy and lymph node sampling performed by Dr. Hesham Prieto on 10/24/2014. The pathology from this revealed adenocarcinoma of the prostate with a [place] of 7 and a tertiary pattern of 5. There was extensive prostatic tissue involvement with over 75% of the tissue with evidence of disease. There was extensive extraprostatic extension involving the apex, right and left anterior and posterior base, and soft tissue around both seminal vesicles. There was bilateral seminal vesicle  invasion with involvement of vas deferens. Margins were involved with bladder neck and bilateral posterior seminal vesicle soft tissue apex. There was evidence of perineural invasion. The lymph node sample was negative for disease. His postoperative PSA in 12/2014 remained elevated at 7.1, and all the subsequent PSAs have remained positive. His restaging scans done in January and again in 03/2015, including a CT abdomen and pelvis and bone scans, have been negative. He was referred to Radiation Oncology for consideration of salvage radiation therapy, and was seen by Dr. Murphy on 03/24/2015. Dr. Murphy referred him to Dr. Zbigniew Chu at the [facility] to have a choline-11 PET/CT scan done. The choline-11 scan done at [place] demonstrated evidence of 2 retroperitoneal lymph nodes that are mildly positive for uptake. There was no abnormal uptake in the prostate bed to suggest local recurrence. He was started on bicalutamide, and received his first dose of Lupron on 05/12/2015.   He comes for a scheduled follow up visit    Oncology Supportive Medications 5/12/2015 9/2/2015 1/4/2016   leuprolide (LUPRON DEPOT) IM 30 mg 30 mg 30 mg     CURRENT TREATMENT  Break period of intermittent androgen deprivation therapy    RACHELLE Sandra comes for a scheduled follow up visit on androgen ablation therapy.     He developed gout after starting bicalutamide. It was very painful for him. His symptoms almost suddenly resolved after he received prednisone within hours. He is off prednisone and received it only for 5 days.       PAST MEDICAL HISTORY   1. Prostate cancer with disease metastatic to para-aortic nodes   2. Borderline HTN  3. ADALID an CPAP at night  4. GERD      CURRENT OUTPATIENT MEDICATIONS     Current Outpatient Prescriptions   Medication Sig Dispense Refill     omeprazole 20 MG tablet Take 1 tablet (20 mg) by mouth daily  30 tablet 1     ascorbic acid (VITAMIN C) 1000 MG TABS Take 1-2 tablets by mouth as needed        Multiple  "Vitamin (DAILY MULTIVITAMIN PO) Take 1 tablet by mouth daily.          ALLERGIES   No Known Allergies     REVIEW OF SYSTEMS   As above in the HPI, o/w complete 12-point ROS was negative.     PHYSICAL EXAM   BP (!) 195/92 (BP Location: Left arm)   Pulse 59   Temp 98.8  F (37.1  C) (Oral)   Resp 18   Ht 1.727 m (5' 7.99\")   Wt 114.6 kg (252 lb 11.2 oz)   SpO2 97%   BMI 38.43 kg/m       SpO2 Readings from Last 4 Encounters:   06/18/18 96%   10/30/17 97%   10/16/17 97%   06/19/17 96%     Wt Readings from Last 3 Encounters:   09/17/19 114.8 kg (253 lb)   09/11/19 115.7 kg (255 lb)   08/06/19 112.9 kg (249 lb)     GEN: NAD  HEENT: PERRL, EOMI, no icterus, injection or pallor. Oropharynx is clear.  NECK: no cervical or supraclavicular lymphadenopathy  LUNGS: clear bilaterally  CV: regular, no murmurs, rubs, or gallops  ABDOMEN: soft, non-tender, non-distended, normal bowel sounds, no hepatosplenomegaly by percussion or palpation  EXT: warm, well perfused, no edema  NEURO: alert  SKIN: lesion in subxiphoid (epigastrium) as below     LABORATORY AND IMAGING STUDIES     Recent Labs   Lab Test 10/28/19  0853 06/17/19  0741 02/18/19  0759 10/22/18  0744 06/11/18  0742    142 140 139 142   POTASSIUM 3.9 4.1 4.1 4.2 4.2   CHLORIDE 107 108 107 105 107   CO2 30 28 26 28 26   ANIONGAP 4 6 7 6 9   BUN 19 21 17 17 17   CR 0.86 1.00 1.02 1.02 1.10   * 123* 100* 120* 117*   KERRY 8.9 8.7 9.0 8.6 9.2     No results for input(s): MAG, PHOS in the last 19595 hours.  Recent Labs   Lab Test 10/28/19  0853 06/17/19  0741 02/18/19  0759 10/22/18  0744 06/11/18  0742   WBC 6.4 6.3 6.7 5.9 5.2   HGB 12.8* 14.4 14.6 14.8 14.0    206 225 215 222   MCV 97 97 94 94 96   NEUTROPHIL 43.1 50.3 50.5 50.9 46.0     Recent Labs   Lab Test 10/28/19  0853 06/17/19  0741 02/18/19  0759  06/11/18  0742 02/12/18  0748 10/09/17  0750   BILITOTAL 0.3 0.4 0.6   < > 0.3 0.5 0.5   ALKPHOS 63 63 57   < > 47 44 51   ALT 72* 60 121*   < > 42 49 " 46   AST 52* 43 76*   < > 33 38 32   ALBUMIN 3.8 3.5 3.7   < > 3.4 3.7 3.5   LDH  --   --   --   --  204 197 204    < > = values in this interval not displayed.     TSH   Date Value Ref Range Status   03/14/2005 2.22 0.4 - 5.0 mU/L Final     No results for input(s): CEA in the last 05199 hours.  Results for orders placed or performed in visit on 06/18/15   US Scrotum and Testicles    Narrative    Examination: Testicular ultrasound 6/18/2015 6:16 PM     Comparison: None.    History: Metastatic prostate cancer and left testicular mass.     Findings: There is homogeneous normal echotexture throughout the  bilateral testes. The left testicle measures 4.9 x 3.2 x 1.9 cm. The  left epididymal head measures 1.2 x 1.0 x 1.0 cm. The right testicle  measures 4.9 x 3.2 x 1.7 cm. The right epididymal head measures 1.3 x  0.7 x 1.1 cm. Doppler evaluation shows normal arterial waveforms to  the testes bilaterally. There is no hydrocele or varicocele. There is  no mass or abnormal calcifications within the testicles. Prominent  rete testes in the left testicle.     In the region of the left epididymal tail where the patient indicates  the palpable lump, there is a round nodule measuring 0.5 x 0.6 x 0.6  cm with a hyperechoic rim. The nodule is not hypervascular.      Impression    Impression: 0.6 cm round nodule with hyperechoic rim in the region of  the left epididymal tail. This may represent a thrombosed varicocele.  The nodule is extratesticular and therefore malignancy is highly  unlikely.    I have personally reviewed the examination and initial interpretation  and I agree with the findings.    MIKE QUACH MD     Recent Labs   Lab Test 10/28/19  0853 06/17/19  0741 02/18/19  0759 10/22/18  0744 06/11/18  0742 02/12/18  0748 10/09/17  0750   PSA 0.13 10.20* 6.42* 6.68* 5.56* 3.43 1.70   TESTOSTTOTAL  --   --  625 611 523 121 274      ASSESSMENT AND PLAN   1. Prostate cancer with metastasis to retroperitoneal nodes  with persistent PSA elevation post prostatectomy; ish 4+3 disease, tertiary score of 5  2. ECOG PS 0  3. No medical comorbiditiies    He is doing well and has no complains.  His testosterone is pending from today. His PSA had gone up to 10.2 at last visit and we treated him with leuprolide. He is off therapy now. His PSA has nicely declined to 0.13 ng/ml. We had decided that we would restart therapy once he is close to 10.  He remembers having a lot of difficulty with bicalutamide previously and again had an episode of gout. I explained rationale to block the effects of testosterone surge and the fact that we could get away without it. Bicalutamide is associated with 2 to less than 5% incidence of gout. I have never previously had a patient who had gout on this agent but he had it and it is well described.     His weight and blood pressures are up. He understands the value of exercise and weight loss. He will follow up with his primary care for his elevated blood pressures.      I will again see him in 6 months with labs a week prior to visit including CBC, CMP, PSA, testosterone.    Pavel Norton  ,  Division of Hematology, Oncology & Transplantation  [facility].       Again, thank you for allowing me to participate in the care of your patient.        Sincerely,        Pavel Norton MD

## 2019-11-15 ENCOUNTER — TELEPHONE (OUTPATIENT)
Dept: FAMILY MEDICINE | Facility: CLINIC | Age: 69
End: 2019-11-15

## 2019-11-15 NOTE — TELEPHONE ENCOUNTER
Called and left VM to return call to [phone]. If patient returns call please transfer to purple team and schedule follow up from last OV and BP check. Gala Simmons MA

## 2019-11-15 NOTE — TELEPHONE ENCOUNTER
Panel Management Review      Patient has the following on his problem list:     Hypertension   Last three blood pressure readings:  BP Readings from Last 3 Encounters:   10/31/19 (!) 195/92   09/17/19 (!) 154/92   09/11/19 (!) 156/88     Blood pressure: FAILED    HTN Guidelines:  Less than 140/90      Composite cancer screening  Chart review shows that this patient is due/due soon for the following None  Summary:    Patient is due/failing the following:   BP CHECK and PHYSICAL    Action needed:   Patient needs office visit for Physical and recheck BP.    Type of outreach:    Phone, left message for patient to call back.     Questions for provider review:    None                                                                                                                                    Gala Simmons MA       Chart routed to Care Team .

## 2019-11-29 NOTE — TELEPHONE ENCOUNTER
Called patient, he will be out of town. Patient will call to schedule an follow up/ BP check appointment when he get back. KARLA Mckinnon

## 2019-12-15 ENCOUNTER — HEALTH MAINTENANCE LETTER (OUTPATIENT)
Age: 69
End: 2019-12-15

## 2020-03-05 ENCOUNTER — TELEPHONE (OUTPATIENT)
Dept: FAMILY MEDICINE | Facility: CLINIC | Age: 70
End: 2020-03-05

## 2020-03-05 NOTE — TELEPHONE ENCOUNTER
Panel Management Review      Patient has the following on his problem list:     Hypertension   Last three blood pressure readings:  BP Readings from Last 3 Encounters:   10/31/19 (!) 195/92   09/17/19 (!) 154/92   09/11/19 (!) 156/88     Blood pressure: FAILED    HTN Guidelines:  Less than 140/90      Composite cancer screening  Chart review shows that this patient is due/due soon for the following None  Summary:    Patient is due/failing the following:   BP CHECK    Action needed:   Patient needs office visit for BP re-assessment.    Type of outreach:    Sent letter.    Questions for provider review:    None                                                                                                                                    Gala Simmons MA       Chart routed to none .

## 2020-03-05 NOTE — LETTER
March 5, 2020          Boaz Acosta,  [address] [address]        Dear Boaz Acosta      Monitoring and managing your preventative and chronic health conditions are very important to us. Our records indicate that you have not scheduled for Appointment with your provider for hypertension re-assessment which was recommended by Dr. Powell      If you have received your health care elsewhere, please call the clinic so the information can be documented in your chart.    Please call [phone] or message us through your [employer] account to schedule an appointment or provide information for your chart.     Feel free to contact us if you have any questions or concerns!    I look forward to seeing you and working with you on your health care needs.     Sincerely,       Your [place] Care Team/AK

## 2020-04-27 ENCOUNTER — TELEPHONE (OUTPATIENT)
Dept: ONCOLOGY | Facility: CLINIC | Age: 70
End: 2020-04-27

## 2020-04-27 DIAGNOSIS — C61 PROSTATE CANCER (H): ICD-10-CM

## 2020-04-27 LAB
ALBUMIN SERPL-MCNC: 3.8 G/DL (ref 3.4–5)
ALP SERPL-CCNC: 41 U/L (ref 40–150)
ALT SERPL W P-5'-P-CCNC: 48 U/L (ref 0–70)
ANION GAP SERPL CALCULATED.3IONS-SCNC: 4 MMOL/L (ref 3–14)
AST SERPL W P-5'-P-CCNC: 43 U/L (ref 0–45)
BASOPHILS # BLD AUTO: 0.1 10E9/L (ref 0–0.2)
BASOPHILS NFR BLD AUTO: 1 %
BILIRUB SERPL-MCNC: 0.5 MG/DL (ref 0.2–1.3)
BUN SERPL-MCNC: 14 MG/DL (ref 7–30)
CALCIUM SERPL-MCNC: 9.6 MG/DL (ref 8.5–10.1)
CHLORIDE SERPL-SCNC: 107 MMOL/L (ref 94–109)
CO2 SERPL-SCNC: 29 MMOL/L (ref 20–32)
CREAT SERPL-MCNC: 0.93 MG/DL (ref 0.66–1.25)
DIFFERENTIAL METHOD BLD: NORMAL
EOSINOPHIL # BLD AUTO: 0.2 10E9/L (ref 0–0.7)
EOSINOPHIL NFR BLD AUTO: 3 %
ERYTHROCYTE [DISTWIDTH] IN BLOOD BY AUTOMATED COUNT: 13.2 % (ref 10–15)
GFR SERPL CREATININE-BSD FRML MDRD: 83 ML/MIN/{1.73_M2}
GLUCOSE SERPL-MCNC: 118 MG/DL (ref 70–99)
HCT VFR BLD AUTO: 42.3 % (ref 40–53)
HGB BLD-MCNC: 14 G/DL (ref 13.3–17.7)
IMM GRANULOCYTES # BLD: 0 10E9/L (ref 0–0.4)
IMM GRANULOCYTES NFR BLD: 0.2 %
LYMPHOCYTES # BLD AUTO: 2 10E9/L (ref 0.8–5.3)
LYMPHOCYTES NFR BLD AUTO: 33.3 %
MCH RBC QN AUTO: 31.4 PG (ref 26.5–33)
MCHC RBC AUTO-ENTMCNC: 33.1 G/DL (ref 31.5–36.5)
MCV RBC AUTO: 95 FL (ref 78–100)
MONOCYTES # BLD AUTO: 0.6 10E9/L (ref 0–1.3)
MONOCYTES NFR BLD AUTO: 10.5 %
NEUTROPHILS # BLD AUTO: 3.2 10E9/L (ref 1.6–8.3)
NEUTROPHILS NFR BLD AUTO: 52 %
PLATELET # BLD AUTO: 238 10E9/L (ref 150–450)
POTASSIUM SERPL-SCNC: 4.4 MMOL/L (ref 3.4–5.3)
PROT SERPL-MCNC: 7.7 G/DL (ref 6.8–8.8)
PSA SERPL-MCNC: 3.3 UG/L (ref 0–4)
RBC # BLD AUTO: 4.46 10E12/L (ref 4.4–5.9)
SODIUM SERPL-SCNC: 140 MMOL/L (ref 133–144)
WBC # BLD AUTO: 6.1 10E9/L (ref 4–11)

## 2020-04-27 PROCEDURE — 84403 ASSAY OF TOTAL TESTOSTERONE: CPT | Performed by: INTERNAL MEDICINE

## 2020-04-27 PROCEDURE — 85025 COMPLETE CBC W/AUTO DIFF WBC: CPT | Performed by: INTERNAL MEDICINE

## 2020-04-27 PROCEDURE — 84153 ASSAY OF PSA TOTAL: CPT | Performed by: INTERNAL MEDICINE

## 2020-04-27 PROCEDURE — 80053 COMPREHEN METABOLIC PANEL: CPT | Performed by: INTERNAL MEDICINE

## 2020-04-27 PROCEDURE — 36415 COLL VENOUS BLD VENIPUNCTURE: CPT | Performed by: INTERNAL MEDICINE

## 2020-04-27 NOTE — TELEPHONE ENCOUNTER
Patient calling for PSA result from today. Provided it is 3.3. Patient saw his Dr Norton appointment is cancelled this week; which patient is ok with. He would like to know when he should schedule next lab and provider follow up appts. Message will be sent to Dr Norton for advice.  Pascale Steel RN, BSN, OCN

## 2020-04-28 LAB — TESTOST SERPL-MCNC: 754 NG/DL (ref 240–950)

## 2020-06-07 ENCOUNTER — OFFICE VISIT (OUTPATIENT)
Dept: URGENT CARE | Facility: URGENT CARE | Age: 70
End: 2020-06-07
Payer: COMMERCIAL

## 2020-06-07 VITALS
BODY MASS INDEX: 37.6 KG/M2 | WEIGHT: 247.2 LBS | TEMPERATURE: 97.2 F | DIASTOLIC BLOOD PRESSURE: 106 MMHG | HEART RATE: 69 BPM | SYSTOLIC BLOOD PRESSURE: 179 MMHG | OXYGEN SATURATION: 95 %

## 2020-06-07 DIAGNOSIS — I10 HYPERTENSION GOAL BP (BLOOD PRESSURE) < 140/90: ICD-10-CM
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
## 2020-06-07 PROCEDURE — 99214 OFFICE O/P EST MOD 30 MIN: CPT | Performed by: PHYSICIAN ASSISTANT

## 2020-06-07 RX ORDER — DOXYCYCLINE HYCLATE 100 MG
200 TABLET ORAL ONCE
Qty: 2 TABLET | Refills: 0 | Status: SHIPPED | OUTPATIENT
Start: 2020-06-07 | End: 2020-06-07

## 2020-06-07 ASSESSMENT — ENCOUNTER SYMPTOMS
RHINORRHEA: 0
MUSCULOSKELETAL NEGATIVE: 1
NEUROLOGICAL NEGATIVE: 1
FREQUENCY: 0
SORE THROAT: 0
COUGH: 0
EYE REDNESS: 0
RESPIRATORY NEGATIVE: 1
LIGHT-HEADEDNESS: 0
ENDOCRINE NEGATIVE: 1
EYES NEGATIVE: 1
DYSURIA: 0
HEADACHES: 0
CARDIOVASCULAR NEGATIVE: 1
MYALGIAS: 0
POLYDIPSIA: 0
CONSTITUTIONAL NEGATIVE: 1
GASTROINTESTINAL NEGATIVE: 1
WOUND: 1
CHILLS: 0
ABDOMINAL PAIN: 0
HEMATURIA: 0
WHEEZING: 0
NAUSEA: 0
ADENOPATHY: 0
EYE DISCHARGE: 0
DIZZINESS: 0
DIAPHORESIS: 0
SHORTNESS OF BREATH: 0
EYE ITCHING: 0
DIARRHEA: 0
FEVER: 0
CHEST TIGHTNESS: 0
WEAKNESS: 0
PALPITATIONS: 0
VOMITING: 0

## 2020-06-07 NOTE — PROGRESS NOTES
Chief Complaint:    Chief Complaint   Patient presents with     Tick Bite     Found deer tick on him this morning. [place] Has had lyme disease twice       HPI: Boaz Acosta is an 69 year old male who presents for evaluation and treatment of tick bite.  Patient noticed the tick this morning and removed it.        ROS:      Review of Systems   Constitutional: Negative.  Negative for chills, diaphoresis and fever.   HENT: Negative.  Negative for congestion, ear pain, rhinorrhea and sore throat.    Eyes: Negative.  Negative for discharge, redness and itching.   Respiratory: Negative.  Negative for cough, chest tightness, shortness of breath and wheezing.    Cardiovascular: Negative.  Negative for chest pain and palpitations.   Gastrointestinal: Negative.  Negative for abdominal pain, diarrhea, nausea and vomiting.   Endocrine: Negative.  Negative for polydipsia and polyuria.   Genitourinary: Negative for dysuria, frequency, hematuria and urgency.   Musculoskeletal: Negative.  Negative for myalgias.   Skin: Positive for wound. Negative for rash.   Allergic/Immunologic: Negative for immunocompromised state.   Neurological: Negative.  Negative for dizziness, weakness, light-headedness and headaches.   Hematological: Negative for adenopathy.        Family History   Family History   Problem Relation Age of Onset     Psychotic Disorder Son      C.A.D. Father      Coronary Artery Disease Father         ac bypass and other     Hypertension Father      Breast Cancer Sister      Breast Cancer Sister      Cerebrovascular Disease No family hx of      Melanoma No family hx of      Skin Cancer No family hx of        Social History  Social History     Socioeconomic History     Marital status:      Spouse name: Not on file     Number of children: Not on file     Years of education: Not on file     Highest education level: Not on file   Occupational History     Employer: UNEMPLOYED   Social Needs     Financial  resource strain: Not on file     Food insecurity     Worry: Not on file     Inability: Not on file     Transportation needs     Medical: Not on file     Non-medical: Not on file   Tobacco Use     Smoking status: Former Smoker     Years: 3.00     Types: Cigars     Smokeless tobacco: Never Used     Tobacco comment: Only occassional cigars   Substance and Sexual Activity     Alcohol use: Yes     Comment: 1-2 beers or a glass of wine per day     Drug use: No     Sexual activity: Not Currently   Lifestyle     Physical activity     Days per week: Not on file     Minutes per session: Not on file     Stress: Not on file   Relationships     Social connections     Talks on phone: Not on file     Gets together: Not on file     Attends Jewish service: Not on file     Active member of club or organization: Not on file     Attends meetings of clubs or organizations: Not on file     Relationship status: Not on file     Intimate partner violence     Fear of current or ex partner: Not on file     Emotionally abused: Not on file     Physically abused: Not on file     Forced sexual activity: Not on file   Other Topics Concern     Parent/sibling w/ CABG, MI or angioplasty before 65F 55M? No   Social History Narrative     Not on file        Surgical History:  Past Surgical History:   Procedure Laterality Date     COLONOSCOPY      on record     DAVINCI LYMPHADENECTOMY Bilateral 10/24/2014    Procedure: DAVINCI LYMPHADENECTOMY;  Surgeon: Hesham Prieto MD;  Location: [facility]     DAVINCI PROSTATECTOMY N/A 10/24/2014    Procedure: DAVINCI PROSTATECTOMY;  Surgeon: Hesham Prieto MD;  Location: [facility]     HERNIA REPAIR      umblical 2007     MOHS MICROGRAPHIC PROCEDURE       ORTHOPEDIC SURGERY Right     right femur repair     ORTHOPEDIC SURGERY Right     hardware removed right femur     ORTHOPEDIC SURGERY Left     tendon bicep repair        Problem List:  Patient Active Problem List   Diagnosis     History of colonic  polyps     Other urinary problems     CARDIOVASCULAR SCREENING; LDL GOAL LESS THAN 130     Hypertension goal BP (blood pressure) < 140/90     Prostate cancer (H)     Obesity     ADALID (obstructive sleep apnea)- severe (AHI 58)     Anaphylactic reaction     Obesity (BMI 35.0-39.9) with comorbidity (H)     Idiopathic chronic 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        Allergies:  No Known Allergies     Current Meds:    Current Outpatient Medications:      ascorbic acid (VITAMIN C) 1000 MG TABS, Take 1-2 tablets by mouth as needed , Disp: , Rfl:      doxycycline hyclate (VIBRA-TABS) 100 MG tablet, Take 2 tablets (200 mg) by mouth once for 1 dose, Disp: 2 tablet, Rfl: 0     esomeprazole (NEXIUM) 20 MG DR capsule, Take 20 mg by mouth every morning (before breakfast) Take 30-60 minutes before eating., Disp: , Rfl:      Leuprolide Acetate, 4 Month, (LUPRON DEPOT, 4-MONTH, IM), , Disp: , Rfl:      Multiple Vitamin (DAILY MULTIVITAMIN PO), Take 1 tablet by mouth daily., Disp: , Rfl:      naproxen sodium (ANAPROX) 220 MG tablet, Take 220 mg by mouth 2 times daily (with meals), Disp: , Rfl:      ALLOPURINOL PO, , Disp: , Rfl:      EPINEPHrine (EPIPEN 2-BRIAN) 0.3 MG/0.3ML injection, Inject 0.3 mLs (0.3 mg) into the muscle once as needed, Disp: , Rfl:      indomethacin (INDOCIN) 50 MG capsule, Take 1 capsule (50 mg) by mouth 2 times daily (with meals) (Patient not taking: Reported on 6/7/2020), Disp: 60 capsule, Rfl: 1     ketorolac (TORADOL) 10 MG tablet, Take 10 mg by mouth every 6 hours as needed for moderate pain, Disp: , Rfl:      omeprazole 20 MG tablet, Take 20 mg by mouth daily as needed, Disp: , Rfl:      ORDER FOR DME, SET TO FAX,, PATIENT WAS SET UP ON A [employer]PIR[employer] 560 AUTO MACHINE AT [employer] WITH HEATED HUMIDITY. PATIENT HAD A CHOICE OF MASK AND CHOSE THE AIRFIT P10 SIZE MEDIUM NASAL PILLOW. HE HAS A F/U APPOINTMENT TO HIS SLEEP STUDY 2/5 WITH TURNER GLASER PA-C AND A SECOND ONE SCHEDULED IN 6 WEEKS., Disp: , Rfl:       predniSONE (DELTASONE) 10 MG tablet, TAKE 6 TABS BY MOUTH DAILY X3DAYS, 4 TABS X3DAYS, 2 TABS X3DAYS, THEN 1 TAB DAILY X3 DAYS., Disp: , Rfl: 0     predniSONE (DELTASONE) 20 MG tablet, Take 3 tabs by mouth daily x 3 days, then 2 tabs daily x 3 days, then 1 tab daily x 3 days, then 1/2 tab daily x 3 days., Disp: 20 tablet, Rfl: 0     PHYSICAL EXAM:     Vital signs noted and reviewed by Chris Mcmanus PA-C  BP (!) 179/106   Pulse 69   Temp 97.2  F (36.2  C) (Tympanic)   Wt 112.1 kg (247 lb 3.2 oz)   SpO2 95%   BMI 37.60 kg/m       PEFR:    Physical Exam  Vitals signs and nursing note reviewed.   Constitutional:       General: He is not in acute distress.     Appearance: He is well-developed. He is not ill-appearing, toxic-appearing or diaphoretic.   HENT:      Head: Normocephalic and atraumatic.      Right Ear: Hearing, tympanic membrane, ear canal and external ear normal. Tympanic membrane is not perforated, erythematous, retracted or bulging.      Left Ear: Hearing, tympanic membrane, ear canal and external ear normal. Tympanic membrane is not perforated, erythematous, retracted or bulging.      Nose: Nose normal. No mucosal edema or rhinorrhea.      Mouth/Throat:      Pharynx: No oropharyngeal exudate or posterior oropharyngeal erythema.      Tonsils: No tonsillar exudate or tonsillar abscesses. 0 on the right. 0 on the left.   Eyes:      Pupils: Pupils are equal, round, and reactive to light.   Neck:      Musculoskeletal: Normal range of motion and neck supple.   Cardiovascular:      Rate and Rhythm: Normal rate and regular rhythm.      Heart sounds: Normal heart sounds, S1 normal and S2 normal. Heart sounds not distant. No murmur. No friction rub. No gallop.    Pulmonary:      Effort: Pulmonary effort is normal. No respiratory distress.      Breath sounds: Normal breath sounds. No decreased breath sounds, wheezing, rhonchi or rales.   Abdominal:      General: Bowel sounds are normal. There is no  distension.      Palpations: Abdomen is soft.      Tenderness: There is no abdominal tenderness.   Lymphadenopathy:      Cervical: No cervical adenopathy.   Skin:     General: Skin is warm and dry.      Findings: No rash.             Comments: Roughly 0.5 cm in diameter area of erythema on the L flank.   Neurological:      Mental Status: He is alert.      Cranial Nerves: No cranial nerve deficit.   Psychiatric:         Attention and Perception: He is attentive.         Speech: Speech normal.         Behavior: Behavior normal. Behavior is cooperative.         Thought Content: Thought content normal.         Judgment: Judgment normal.          Labs:     No results found for any visits on 06/07/20.    Medical Decision Making:    Differential Diagnosis:  Tick bite     ASSESSMENT:     1. Tick bite, initial encounter    2. Hypertension goal BP (blood pressure) < 140/90           PLAN:     Rx for Doxycycline 200 Mg once today.  Patient is hypertensive in clinic today.  Patient instructed to follow up with PCP in 1 week for BP recheck.  Sooner if symptoms worsen.  Worrisome symptoms discussed with instructions to go to the ED.  Patient verbalized understanding and agreed with this plan.     Chris Mcmanus PA-C  6/7/2020, 9:30 AM

## 2020-06-25 ENCOUNTER — MYC MEDICAL ADVICE (OUTPATIENT)
Dept: FAMILY MEDICINE | Facility: CLINIC | Age: 70
End: 2020-06-25

## 2020-06-25 ENCOUNTER — TELEPHONE (OUTPATIENT)
Dept: FAMILY MEDICINE | Facility: CLINIC | Age: 70
End: 2020-06-25

## 2020-06-25 DIAGNOSIS — M10.9 ACUTE GOUT, UNSPECIFIED CAUSE, UNSPECIFIED SITE: Primary | ICD-10-CM

## 2020-06-25 NOTE — TELEPHONE ENCOUNTER
Patient states when he was last seen for gout (on 9/17/19) PCP told him there would be a standing order for Uric acid so he could have labs done any time he thought he was having a flare.  He has had pain in his right ankle for 3 days now and it feels like a gout flare.  He called to make lab appointment but standing order was not found.   Order pending.   Sol Goodwin RN

## 2020-06-25 NOTE — TELEPHONE ENCOUNTER
Reason for Call: Request for an order or referral:    Order or referral being requested: labs for gout    Date needed: as soon as possible    Has the patient been seen by the PCP for this problem? YES    Additional comments:    Phone number Patient can be reached at:  Home number on file [phone] (home)    Best Time:  any    Can we leave a detailed message on this number?  YES    Call taken on 6/25/2020 at 9:05 AM by Wayne Mathew

## 2020-06-26 ENCOUNTER — MYC REFILL (OUTPATIENT)
Dept: FAMILY MEDICINE | Facility: CLINIC | Age: 70
End: 2020-06-26

## 2020-06-26 DIAGNOSIS — M10.9 ACUTE GOUT OF RIGHT ANKLE, UNSPECIFIED CAUSE: ICD-10-CM

## 2020-06-26 NOTE — TELEPHONE ENCOUNTER
"Routing refill request to provider for review/approval because:  Failed protocol - see below  Patient states he only has 2 tablets left    Requested Prescriptions   Pending Prescriptions Disp Refills    indomethacin (INDOCIN) 50 MG capsule 60 capsule 1     Sig: Take 1 capsule (50 mg) by mouth 2 times daily (with meals)       NSAID Medications Failed - 6/26/2020  9:27 AM        Failed - Blood pressure under 140/90 in past 12 months     BP Readings from Last 3 Encounters:   06/07/20 (!) 179/106   10/31/19 (!) 195/92   09/17/19 (!) 154/92                 Failed - Patient is age 6-64 years        Passed - Normal ALT on file in past 12 months     Recent Labs   Lab Test 04/27/20  0803   ALT 48             Passed - Normal AST on file in past 12 months     Recent Labs   Lab Test 04/27/20  0803   AST 43             Passed - Recent (12 mo) or future (30 days) visit within the authorizing provider's specialty     Patient has had an office visit with the authorizing provider or a provider within the authorizing providers department within the previous 12 mos or has a future within next 30 days. See \"Patient Info\" tab in inbasket, or \"Choose Columns\" in Meds & Orders section of the refill encounter.              Passed - Normal CBC on file in past 12 months     Recent Labs   Lab Test 04/27/20  0803   WBC 6.1   RBC 4.46   HGB 14.0   HCT 42.3                    Passed - Medication is active on med list        Passed - Normal serum creatinine on file in past 12 months     Recent Labs   Lab Test 04/27/20  0803  09/29/14  0736   CR 0.93   < >  --    CRPOC  --   --  1.3*    < > = values in this interval not displayed.       Ok to refill medication if creatinine is low         Gout Agents Protocol Passed - 6/26/2020  9:27 AM        Passed - CBC on file in past 12 months     Recent Labs   Lab Test 04/27/20  0803   WBC 6.1   RBC 4.46   HGB 14.0   HCT 42.3                    Passed - ALT on file in past 12 months     Recent " "Labs   Lab Test 04/27/20  0803   ALT 48             Passed - Has Uric Acid on file in past 12 months and value is less than 6     Recent Labs   Lab Test 08/06/19  1226   URIC 5.0     If level is 6mg/dL or greater, ok to refill one time and refer to provider.           Passed - Recent (12 mo) or future (30 days) visit within the authorizing provider's specialty     Patient has had an office visit with the authorizing provider or a provider within the authorizing providers department within the previous 12 mos or has a future within next 30 days. See \"Patient Info\" tab in inbasket, or \"Choose Columns\" in Meds & Orders section of the refill encounter.              Passed - Medication is active on med list        Passed - Patient is age 18 or older        Passed - Normal serum creatinine on file in the past 12 months     Recent Labs   Lab Test 04/27/20  0803  09/29/14  0736   CR 0.93   < >  --    CRPOC  --   --  1.3*    < > = values in this interval not displayed.       Ok to refill medication if creatinine is low             Maria R Orozco RN  "

## 2020-07-06 RX ORDER — INDOMETHACIN 50 MG/1
50 CAPSULE ORAL 2 TIMES DAILY WITH MEALS
Qty: 60 CAPSULE | Refills: 1 | Status: SHIPPED | OUTPATIENT
Start: 2020-07-06 | End: 2022-10-20

## 2020-07-30 ENCOUNTER — VIRTUAL VISIT (OUTPATIENT)
Dept: ONCOLOGY | Facility: CLINIC | Age: 70
End: 2020-07-30
Attending: INTERNAL MEDICINE
Payer: COMMERCIAL

## 2020-07-30 ENCOUNTER — INFUSION THERAPY VISIT (OUTPATIENT)
Dept: INFUSION THERAPY | Facility: CLINIC | Age: 70
End: 2020-07-30
Payer: COMMERCIAL

## 2020-07-30 VITALS
WEIGHT: 247.1 LBS | RESPIRATION RATE: 18 BRPM | DIASTOLIC BLOOD PRESSURE: 82 MMHG | SYSTOLIC BLOOD PRESSURE: 162 MMHG | HEART RATE: 60 BPM | BODY MASS INDEX: 37.58 KG/M2 | TEMPERATURE: 98.8 F | OXYGEN SATURATION: 97 %

## 2020-07-30 DIAGNOSIS — C61 PROSTATE CANCER (H): ICD-10-CM

## 2020-07-30 DIAGNOSIS — C61 PROSTATE CANCER (H): Primary | ICD-10-CM

## 2020-07-30 LAB
ALBUMIN SERPL-MCNC: 3.8 G/DL (ref 3.4–5)
ALP SERPL-CCNC: 48 U/L (ref 40–150)
ALT SERPL W P-5'-P-CCNC: 89 U/L (ref 0–70)
ANION GAP SERPL CALCULATED.3IONS-SCNC: 4 MMOL/L (ref 3–14)
AST SERPL W P-5'-P-CCNC: 68 U/L (ref 0–45)
BASOPHILS # BLD AUTO: 0 10E9/L (ref 0–0.2)
BASOPHILS NFR BLD AUTO: 0.7 %
BILIRUB SERPL-MCNC: 0.6 MG/DL (ref 0.2–1.3)
BUN SERPL-MCNC: 12 MG/DL (ref 7–30)
CALCIUM SERPL-MCNC: 9.2 MG/DL (ref 8.5–10.1)
CHLORIDE SERPL-SCNC: 110 MMOL/L (ref 94–109)
CO2 SERPL-SCNC: 28 MMOL/L (ref 20–32)
CREAT SERPL-MCNC: 0.94 MG/DL (ref 0.66–1.25)
DIFFERENTIAL METHOD BLD: NORMAL
EOSINOPHIL # BLD AUTO: 0.3 10E9/L (ref 0–0.7)
EOSINOPHIL NFR BLD AUTO: 4.6 %
ERYTHROCYTE [DISTWIDTH] IN BLOOD BY AUTOMATED COUNT: 13.1 % (ref 10–15)
GFR SERPL CREATININE-BSD FRML MDRD: 83 ML/MIN/{1.73_M2}
GLUCOSE SERPL-MCNC: 114 MG/DL (ref 70–99)
HCT VFR BLD AUTO: 41.9 % (ref 40–53)
HGB BLD-MCNC: 14.1 G/DL (ref 13.3–17.7)
IMM GRANULOCYTES # BLD: 0 10E9/L (ref 0–0.4)
IMM GRANULOCYTES NFR BLD: 0.2 %
LYMPHOCYTES # BLD AUTO: 2.3 10E9/L (ref 0.8–5.3)
LYMPHOCYTES NFR BLD AUTO: 41.6 %
MCH RBC QN AUTO: 31.5 PG (ref 26.5–33)
MCHC RBC AUTO-ENTMCNC: 33.7 G/DL (ref 31.5–36.5)
MCV RBC AUTO: 94 FL (ref 78–100)
MONOCYTES # BLD AUTO: 0.7 10E9/L (ref 0–1.3)
MONOCYTES NFR BLD AUTO: 13.3 %
NEUTROPHILS # BLD AUTO: 2.1 10E9/L (ref 1.6–8.3)
NEUTROPHILS NFR BLD AUTO: 39.6 %
PLATELET # BLD AUTO: 222 10E9/L (ref 150–450)
POTASSIUM SERPL-SCNC: 4.3 MMOL/L (ref 3.4–5.3)
PROT SERPL-MCNC: 7.4 G/DL (ref 6.8–8.8)
PSA SERPL-MCNC: 8.23 UG/L (ref 0–4)
RBC # BLD AUTO: 4.48 10E12/L (ref 4.4–5.9)
SODIUM SERPL-SCNC: 142 MMOL/L (ref 133–144)
WBC # BLD AUTO: 5.4 10E9/L (ref 4–11)

## 2020-07-30 PROCEDURE — 80053 COMPREHEN METABOLIC PANEL: CPT | Performed by: INTERNAL MEDICINE

## 2020-07-30 PROCEDURE — 85025 COMPLETE CBC W/AUTO DIFF WBC: CPT | Performed by: INTERNAL MEDICINE

## 2020-07-30 PROCEDURE — 36415 COLL VENOUS BLD VENIPUNCTURE: CPT | Performed by: INTERNAL MEDICINE

## 2020-07-30 PROCEDURE — 99214 OFFICE O/P EST MOD 30 MIN: CPT | Mod: 25 | Performed by: INTERNAL MEDICINE

## 2020-07-30 PROCEDURE — 96402 CHEMO HORMON ANTINEOPL SQ/IM: CPT | Performed by: NURSE PRACTITIONER

## 2020-07-30 PROCEDURE — 99207 ZZC NO CHARGE LOS: CPT

## 2020-07-30 PROCEDURE — 84403 ASSAY OF TOTAL TESTOSTERONE: CPT | Performed by: INTERNAL MEDICINE

## 2020-07-30 PROCEDURE — 84153 ASSAY OF PSA TOTAL: CPT | Performed by: INTERNAL MEDICINE

## 2020-07-30 RX ORDER — BICALUTAMIDE 50 MG/1
50 TABLET, FILM COATED ORAL DAILY
Qty: 15 TABLET | Refills: 0 | Status: SHIPPED | OUTPATIENT
Start: 2020-07-30 | End: 2021-07-15

## 2020-07-30 ASSESSMENT — PAIN SCALES - GENERAL: PAINLEVEL: NO PAIN (0)

## 2020-07-30 NOTE — PROGRESS NOTES
[facility]  FOLLOW-UP VISIT NOTE    PATIENT NAME: Boaz Acosta MRN # [id number]  DATE OF VISIT: Jul 30, 2020 YOB: 1950    REFERRING PROVIDER: Rocío Boss MD  [address] [address]    CANCER TYPE: Prostate adenocarcinoma  STAGE: IV  ECOG PS: 0    TREATMENT SUMMARY:  Boaz's annual screening PSA was noted to be elevated at 17 in 2012, and 21 in the following year and it was attributed to prostatitis. Eventually, he did change his primary care physician and his repeat PSA was noted to be elevated at 31 in 04/2014. He was then referred to Urology, and was seen by Dr. Pressley with Urology in 07/2014. He was worked up with a biopsy of the prostate on 08/01//2014, which revealed adenocarcinoma of the prostate with Willis score of 4+3 involving 12 of the 12 cores. He was staged with a PET/CT scan, which did not show any evidence of distant metastasis. There was evidence of increased FDG uptake throughout the prostate with a maximum SUV of 7.1. He had a bone scan done on 08/19/2014, which did not show any obvious evidence of metastasis. Mr. Acosta underwent a robotic-assisted radical prostatectomy and lymph node sampling performed by Dr. Hesham Prieto on 10/24/2014. The pathology from this revealed adenocarcinoma of the prostate with a Vidal of 7 and a tertiary pattern of 5. There was extensive prostatic tissue involvement with over 75% of the tissue with evidence of disease. There was extensive extraprostatic extension involving the apex, right and left anterior and posterior base, and soft tissue around both seminal vesicles. There was bilateral seminal vesicle invasion with involvement of vas deferens. Margins were involved with bladder neck and bilateral posterior seminal vesicle soft tissue apex. There was evidence of perineural invasion. The lymph node sample was negative for disease. His postoperative PSA  in 12/2014 remained elevated at 7.1, and all the subsequent PSAs have remained positive. His restaging scans done in January and again in 03/2015, including a CT abdomen and pelvis and bone scans, have been negative. He was referred to Radiation Oncology for consideration of salvage radiation therapy, and was seen by Dr. Murphy on 03/24/2015. Dr. Murphy referred him to Dr. Zbigniew Chu at the [facility] to have a choline-11 PET/CT scan done. The choline-11 scan done at [place] demonstrated evidence of 2 retroperitoneal lymph nodes that are mildly positive for uptake. There was no abnormal uptake in the prostate bed to suggest local recurrence. He was started on bicalutamide, and received his first dose of Lupron on 05/12/2015.   He comes for a scheduled follow up visit    Oncology Supportive Medications 5/12/2015 9/2/2015 1/4/2016   leuprolide (LUPRON DEPOT) IM 30 mg 30 mg 30 mg     CURRENT TREATMENT  Break period of intermittent androgen deprivation therapy    SUBJECTIVE   Boaz comes for a scheduled follow up visit on androgen ablation therapy.     Boaz was seen in person as he was in clinic and we could not connect through video despite trying for a while.     He has been doing well and denies any other complains. He is anxious about his PSA value which was rising at the last visit.       PAST MEDICAL HISTORY   1. Prostate cancer with disease metastatic to para-aortic nodes   2. Borderline HTN  3. ADALID an CPAP at night  4. GERD      CURRENT OUTPATIENT MEDICATIONS     Current Outpatient Prescriptions   Medication Sig Dispense Refill     omeprazole 20 MG tablet Take 1 tablet (20 mg) by mouth daily  30 tablet 1     ascorbic acid (VITAMIN C) 1000 MG TABS Take 1-2 tablets by mouth as needed        Multiple Vitamin (DAILY MULTIVITAMIN PO) Take 1 tablet by mouth daily.          ALLERGIES   No Known Allergies     REVIEW OF SYSTEMS   As above in the HPI, o/w complete 12-point ROS was negative.     PHYSICAL EXAM   BP (!) 162/82 (BP  Location: Right arm)   Pulse 60   Temp 98.8  F (37.1  C) (Oral)   Resp 18   Wt 112.1 kg (247 lb 1.6 oz)   SpO2 97%   BMI 37.58 kg/m      SpO2 Readings from Last 4 Encounters:   06/18/18 96%   10/30/17 97%   10/16/17 97%   06/19/17 96%     Wt Readings from Last 3 Encounters:   06/07/20 112.1 kg (247 lb 3.2 oz)   10/31/19 114.6 kg (252 lb 11.2 oz)   09/17/19 114.8 kg (253 lb)     GEN: NAD  HEENT: PERRL, EOMI, no icterus, injection or pallor. Oropharynx is clear.  NECK: no cervical or supraclavicular lymphadenopathy  LUNGS: clear bilaterally  CV: regular, no murmurs, rubs, or gallops  ABDOMEN: soft, non-tender, non-distended, normal bowel sounds, no hepatosplenomegaly by percussion or palpation  EXT: warm, well perfused, no edema  NEURO: alert  SKIN: lesion in subxiphoid (epigastrium) as below     LABORATORY AND IMAGING STUDIES     Recent Labs   Lab Test 07/30/20  0733 04/27/20  0803 10/28/19  0853 06/17/19  0741 02/18/19  0759    140 141 142 140   POTASSIUM 4.3 4.4 3.9 4.1 4.1   CHLORIDE 110* 107 107 108 107   CO2 28 29 30 28 26   ANIONGAP 4 4 4 6 7   BUN 12 14 19 21 17   CR 0.94 0.93 0.86 1.00 1.02   * 118* 128* 123* 100*   KERRY 9.2 9.6 8.9 8.7 9.0     No results for input(s): MAG, PHOS in the last 84429 hours.  Recent Labs   Lab Test 07/30/20  0733 04/27/20  0803 10/28/19  0853 06/17/19  0741 02/18/19  0759   WBC 5.4 6.1 6.4 6.3 6.7   HGB 14.1 14.0 12.8* 14.4 14.6    238 223 206 225   MCV 94 95 97 97 94   NEUTROPHIL 39.6 52.0 43.1 50.3 50.5     Recent Labs   Lab Test 07/30/20  0733 04/27/20  0803 10/28/19  0853  06/11/18  0742 02/12/18  0748 10/09/17  0750   BILITOTAL 0.6 0.5 0.3   < > 0.3 0.5 0.5   ALKPHOS 48 41 63   < > 47 44 51   ALT 89* 48 72*   < > 42 49 46   AST 68* 43 52*   < > 33 38 32   ALBUMIN 3.8 3.8 3.8   < > 3.4 3.7 3.5   LDH  --   --   --   --  204 197 204    < > = values in this interval not displayed.     TSH   Date Value Ref Range Status   03/14/2005 2.22 0.4 - 5.0 mU/L  Final     Recent Labs   Lab Test 07/30/20  0733 04/27/20  0803 10/28/19  0853 06/17/19  0741 02/18/19  0759 10/22/18  0744 06/11/18  0742 02/12/18  0748   PSA 8.23* 3.30 0.13 10.20* 6.42* 6.68* 5.56* 3.43   TESTOSTTOTAL  --  754  --   --  968 611 523 557      ASSESSMENT AND PLAN   1. Prostate cancer with metastasis to retroperitoneal nodes with persistent PSA elevation post prostatectomy; ish 4+3 disease, tertiary score of 5  2. ECOG PS 0  3. No medical comorbiditiies    He is doing well and has no complains.  His testosterone is pending from today. His PSA had gone up to 10.2 at last visit and we treated him with leuprolide. He is off therapy now. His PSA has nicely declined to 0.13 ng/ml. We had decided that we would restart therapy once he is close to 10.  He remembers having a lot of difficulty with bicalutamide previously and again had an episode of gout and also with leuprolide. He had hot flashes, headaches, lack of flexibility in his achilles and generalized aching on leuprolide therapy. Some of these side effects are not typical for leuprolide but if we had to do it on continuous basis, I would recommend orchiectomy for him. He wondered about alternatives. I explained him that we could try bicalutamide alone instead but it has not been very effective.     Incidentally he had gout after taking bicalutamide which is associated with 2 to less than 5% incidence of gout. I have never previously had a patient who had gout on this agent but he had it and it is well described.      His blood pressures are up. He understands the value of exercise and weight loss. He will follow up with his primary care for his elevated blood pressures.   Wt Readings from Last 3 Encounters:   07/30/20 112.1 kg (247 lb 1.6 oz)   06/07/20 112.1 kg (247 lb 3.2 oz)   10/31/19 114.6 kg (252 lb 11.2 oz)     He will get leuprolide today. I will again see him in 6 months with labs a week prior to visit including CBC, CMP, PSA,  chidi Norton  ,  Division of Hematology, Oncology & Transplantation  [facility].

## 2020-07-30 NOTE — LETTER
7/30/2020         RE: Boaz Acosta  [address] [facility] [id number]        Dear Colleague,    Thank you for referring your patient, Boaz Acosta, to the [facility]. Please see a copy of my visit note below.    [facility]  FOLLOW-UP VISIT NOTE    PATIENT NAME: Boaz Acosta MRN # [id number]  DATE OF VISIT: Jul 30, 2020 YOB: 1950    REFERRING PROVIDER: Rocío Boss MD  [facility]  [address] [place] [address]    CANCER TYPE: Prostate adenocarcinoma  STAGE: IV  ECOG PS: 0    TREATMENT SUMMARY:  Boaz's annual screening PSA was noted to be elevated at 17 in 2012, and 21 in the following year and it was attributed to prostatitis. Eventually, he did change his primary care physician and his repeat PSA was noted to be elevated at 31 in 04/2014. He was then referred to Urology, and was seen by Dr. Pressley with Urology in 07/2014. He was worked up with a biopsy of the prostate on 08/01//2014, which revealed adenocarcinoma of the prostate with Willis score of 4+3 involving 12 of the 12 cores. He was staged with a PET/CT scan, which did not show any evidence of distant metastasis. There was evidence of increased FDG uptake throughout the prostate with a maximum SUV of 7.1. He had a bone scan done on 08/19/2014, which did not show any obvious evidence of metastasis. Mr. Acosta underwent a robotic-assisted radical prostatectomy and lymph node sampling performed by Dr. Hesham Prieto on 10/24/2014. The pathology from this revealed adenocarcinoma of the prostate with a Willis of 7 and a tertiary pattern of 5. There was extensive prostatic tissue involvement with over 75% of the tissue with evidence of disease. There was extensive extraprostatic extension involving the apex, right and left anterior and posterior base, and soft tissue around both seminal vesicles. There was bilateral seminal vesicle invasion  with involvement of vas deferens. Margins were involved with bladder neck and bilateral posterior seminal vesicle soft tissue apex. There was evidence of perineural invasion. The lymph node sample was negative for disease. His postoperative PSA in 12/2014 remained elevated at 7.1, and all the subsequent PSAs have remained positive. His restaging scans done in January and again in 03/2015, including a CT abdomen and pelvis and bone scans, have been negative. He was referred to Radiation Oncology for consideration of salvage radiation therapy, and was seen by Dr. Murphy on 03/24/2015. Dr. Murphy referred him to Dr. Zbigniew Chu at the [facility] to have a choline-11 PET/CT scan done. The choline-11 scan done at [place] demonstrated evidence of 2 retroperitoneal lymph nodes that are mildly positive for uptake. There was no abnormal uptake in the prostate bed to suggest local recurrence. He was started on bicalutamide, and received his first dose of Lupron on 05/12/2015.   He comes for a scheduled follow up visit    Oncology Supportive Medications 5/12/2015 9/2/2015 1/4/2016   leuprolide (LUPRON DEPOT) IM 30 mg 30 mg 30 mg     CURRENT TREATMENT  Break period of intermittent androgen deprivation therapy    SUBJECTIVE   Boaz comes for a scheduled follow up visit on androgen ablation therapy.     Boaz was seen in person as he was in clinic and we could not connect through video despite trying for a while.     He has been doing well and denies any other complains. He is anxious about his PSA value which was rising at the last visit.       PAST MEDICAL HISTORY   1. Prostate cancer with disease metastatic to para-aortic nodes   2. Borderline HTN  3. ADALID an CPAP at night  4. GERD      CURRENT OUTPATIENT MEDICATIONS     Current Outpatient Prescriptions   Medication Sig Dispense Refill     omeprazole 20 MG tablet Take 1 tablet (20 mg) by mouth daily  30 tablet 1     ascorbic acid (VITAMIN C) 1000 MG TABS Take 1-2 tablets by mouth as  needed        Multiple Vitamin (DAILY MULTIVITAMIN PO) Take 1 tablet by mouth daily.          ALLERGIES   No Known Allergies     REVIEW OF SYSTEMS   As above in the HPI, o/w complete 12-point ROS was negative.     PHYSICAL EXAM   BP (!) 162/82 (BP Location: Right arm)   Pulse 60   Temp 98.8  F (37.1  C) (Oral)   Resp 18   Wt 112.1 kg (247 lb 1.6 oz)   SpO2 97%   BMI 37.58 kg/m      SpO2 Readings from Last 4 Encounters:   06/18/18 96%   10/30/17 97%   10/16/17 97%   06/19/17 96%     Wt Readings from Last 3 Encounters:   06/07/20 112.1 kg (247 lb 3.2 oz)   10/31/19 114.6 kg (252 lb 11.2 oz)   09/17/19 114.8 kg (253 lb)     GEN: NAD  HEENT: PERRL, EOMI, no icterus, injection or pallor. Oropharynx is clear.  NECK: no cervical or supraclavicular lymphadenopathy  LUNGS: clear bilaterally  CV: regular, no murmurs, rubs, or gallops  ABDOMEN: soft, non-tender, non-distended, normal bowel sounds, no hepatosplenomegaly by percussion or palpation  EXT: warm, well perfused, no edema  NEURO: alert  SKIN: lesion in subxiphoid (epigastrium) as below     LABORATORY AND IMAGING STUDIES     Recent Labs   Lab Test 07/30/20  0733 04/27/20  0803 10/28/19  0853 06/17/19  0741 02/18/19  0759    140 141 142 140   POTASSIUM 4.3 4.4 3.9 4.1 4.1   CHLORIDE 110* 107 107 108 107   CO2 28 29 30 28 26   ANIONGAP 4 4 4 6 7   BUN 12 14 19 21 17   CR 0.94 0.93 0.86 1.00 1.02   * 118* 128* 123* 100*   KERRY 9.2 9.6 8.9 8.7 9.0     No results for input(s): MAG, PHOS in the last 59412 hours.  Recent Labs   Lab Test 07/30/20  0733 04/27/20  0803 10/28/19  0853 06/17/19  0741 02/18/19  0759   WBC 5.4 6.1 6.4 6.3 6.7   HGB 14.1 14.0 12.8* 14.4 14.6    238 223 206 225   MCV 94 95 97 97 94   NEUTROPHIL 39.6 52.0 43.1 50.3 50.5     Recent Labs   Lab Test 07/30/20  0733 04/27/20  0803 10/28/19  0853  06/11/18  0742 02/12/18  0748 10/09/17  0750   BILITOTAL 0.6 0.5 0.3   < > 0.3 0.5 0.5   ALKPHOS 48 41 63   < > 47 44 51   ALT 89* 48 72*    < > 42 49 46   AST 68* 43 52*   < > 33 38 32   ALBUMIN 3.8 3.8 3.8   < > 3.4 3.7 3.5   LDH  --   --   --   --  204 197 204    < > = values in this interval not displayed.     TSH   Date Value Ref Range Status   03/14/2005 2.22 0.4 - 5.0 mU/L Final     Recent Labs   Lab Test 07/30/20  0733 04/27/20  0803 10/28/19  0853 06/17/19  0741 02/18/19  0759 10/22/18  0744 06/11/18  0742 02/12/18  0748   PSA 8.23* 3.30 0.13 10.20* 6.42* 6.68* 5.56* 3.43   TESTOSTTOTAL  --  754  --   --  [phone] 857      ASSESSMENT AND PLAN   1. Prostate cancer with metastasis to retroperitoneal nodes with persistent PSA elevation post prostatectomy; ish 4+3 disease, tertiary score of 5  2. ECOG PS 0  3. No medical comorbiditiies    He is doing well and has no complains.  His testosterone is pending from today. His PSA had gone up to 10.2 at last visit and we treated him with leuprolide. He is off therapy now. His PSA has nicely declined to 0.13 ng/ml. We had decided that we would restart therapy once he is close to 10.  He remembers having a lot of difficulty with bicalutamide previously and again had an episode of gout and also with leuprolide. He had hot flashes, headaches, lack of flexibility in his achilles and generalized aching on leuprolide therapy. Some of these side effects are not typical for leuprolide but if we had to do it on continuous basis, I would recommend orchiectomy for him. He wondered about alternatives. I explained him that we could try bicalutamide alone instead but it has not been very effective.     Incidentally he had gout after taking bicalutamide which is associated with 2 to less than 5% incidence of gout. I have never previously had a patient who had gout on this agent but he had it and it is well described.      His blood pressures are up. He understands the value of exercise and weight loss. He will follow up with his primary care for his elevated blood pressures.   Wt Readings from Last 3 Encounters:    07/30/20 112.1 kg (247 lb 1.6 oz)   06/07/20 112.1 kg (247 lb 3.2 oz)   10/31/19 114.6 kg (252 lb 11.2 oz)     He will get leuprolide today. I will again see him in 6 months with labs a week prior to visit including CBC, CMP, PSA, testosterone.    Pavel Norton  ,  Division of Hematology, Oncology & Transplantation  [facility].       Again, thank you for allowing me to participate in the care of your patient.        Sincerely,        Pavel Norton MD

## 2020-07-30 NOTE — NURSING NOTE
Dr. Norton saw patient face to face as video visit option was not connecting properly.      SYMPTOM QUESTIONNAIRE    Pain: no    Nausea/Vomiting: no    Mouth Sores: no    Shortness of Breath: no    Smoking: no    Fever or Chills: no    Hard Stools: no    Soft Stools: no    Weight Loss: no    Weakness: no    Burning, numbness or tingling in hands or feet: no    Problems with skin or swelling: no    Memory Loss: no    Anxiety or Depression: no    Trouble Sleeping: no    Donna Parnell LPN on 7/30/2020 at 8:54 AM

## 2020-07-30 NOTE — PROGRESS NOTES
Infusion Nursing Note:  Boaz Acosta presents today for Lupron.    Patient seen by provider today: Yes: MD   present during visit today: Not Applicable.    Note: patient notes HA/nausea/joint aches post hormone therapy.      Discharge Plan:   Patient to stop and scheduling for follow up appts.    MATTHIAS RICHARDS RN

## 2020-08-01 LAB — TESTOST SERPL-MCNC: 582 NG/DL (ref 240–950)

## 2021-01-15 ENCOUNTER — HEALTH MAINTENANCE LETTER (OUTPATIENT)
Age: 71
End: 2021-01-15

## 2021-01-18 DIAGNOSIS — C61 PROSTATE CANCER (H): ICD-10-CM

## 2021-01-18 LAB
ALBUMIN SERPL-MCNC: 3.7 G/DL (ref 3.4–5)
ALP SERPL-CCNC: 50 U/L (ref 40–150)
ALT SERPL W P-5'-P-CCNC: 96 U/L (ref 0–70)
ANION GAP SERPL CALCULATED.3IONS-SCNC: 6 MMOL/L (ref 3–14)
AST SERPL W P-5'-P-CCNC: 55 U/L (ref 0–45)
BASOPHILS # BLD AUTO: 0.1 10E9/L (ref 0–0.2)
BASOPHILS NFR BLD AUTO: 0.9 %
BILIRUB SERPL-MCNC: 0.4 MG/DL (ref 0.2–1.3)
BUN SERPL-MCNC: 15 MG/DL (ref 7–30)
CALCIUM SERPL-MCNC: 9.1 MG/DL (ref 8.5–10.1)
CHLORIDE SERPL-SCNC: 107 MMOL/L (ref 94–109)
CO2 SERPL-SCNC: 28 MMOL/L (ref 20–32)
CREAT SERPL-MCNC: 0.89 MG/DL (ref 0.66–1.25)
DIFFERENTIAL METHOD BLD: ABNORMAL
EOSINOPHIL # BLD AUTO: 0.2 10E9/L (ref 0–0.7)
EOSINOPHIL NFR BLD AUTO: 3.1 %
ERYTHROCYTE [DISTWIDTH] IN BLOOD BY AUTOMATED COUNT: 12.5 % (ref 10–15)
GFR SERPL CREATININE-BSD FRML MDRD: 87 ML/MIN/{1.73_M2}
GLUCOSE SERPL-MCNC: 128 MG/DL (ref 70–99)
HCT VFR BLD AUTO: 40.7 % (ref 40–53)
HGB BLD-MCNC: 13.6 G/DL (ref 13.3–17.7)
IMM GRANULOCYTES # BLD: 0 10E9/L (ref 0–0.4)
IMM GRANULOCYTES NFR BLD: 0.2 %
LYMPHOCYTES # BLD AUTO: 2.6 10E9/L (ref 0.8–5.3)
LYMPHOCYTES NFR BLD AUTO: 40.2 %
MCH RBC QN AUTO: 31.7 PG (ref 26.5–33)
MCHC RBC AUTO-ENTMCNC: 33.4 G/DL (ref 31.5–36.5)
MCV RBC AUTO: 95 FL (ref 78–100)
MONOCYTES # BLD AUTO: 0.8 10E9/L (ref 0–1.3)
MONOCYTES NFR BLD AUTO: 13.1 %
NEUTROPHILS # BLD AUTO: 2.7 10E9/L (ref 1.6–8.3)
NEUTROPHILS NFR BLD AUTO: 42.5 %
PLATELET # BLD AUTO: 207 10E9/L (ref 150–450)
POTASSIUM SERPL-SCNC: 4.1 MMOL/L (ref 3.4–5.3)
PROT SERPL-MCNC: 7.5 G/DL (ref 6.8–8.8)
PSA SERPL-MCNC: 0.38 UG/L (ref 0–4)
RBC # BLD AUTO: 4.29 10E12/L (ref 4.4–5.9)
SODIUM SERPL-SCNC: 141 MMOL/L (ref 133–144)
WBC # BLD AUTO: 6.4 10E9/L (ref 4–11)

## 2021-01-18 PROCEDURE — 85025 COMPLETE CBC W/AUTO DIFF WBC: CPT | Performed by: INTERNAL MEDICINE

## 2021-01-18 PROCEDURE — 36415 COLL VENOUS BLD VENIPUNCTURE: CPT | Performed by: INTERNAL MEDICINE

## 2021-01-18 PROCEDURE — 99000 SPECIMEN HANDLING OFFICE-LAB: CPT | Performed by: INTERNAL MEDICINE

## 2021-01-18 PROCEDURE — 84153 ASSAY OF PSA TOTAL: CPT | Performed by: INTERNAL MEDICINE

## 2021-01-18 PROCEDURE — 80053 COMPREHEN METABOLIC PANEL: CPT | Performed by: INTERNAL MEDICINE

## 2021-01-18 PROCEDURE — 84403 ASSAY OF TOTAL TESTOSTERONE: CPT | Mod: 90 | Performed by: INTERNAL MEDICINE

## 2021-01-20 LAB — TESTOST SERPL-MCNC: 671 NG/DL (ref 240–950)

## 2021-01-21 ENCOUNTER — ONCOLOGY VISIT (OUTPATIENT)
Dept: ONCOLOGY | Facility: CLINIC | Age: 71
End: 2021-01-21
Attending: INTERNAL MEDICINE
Payer: COMMERCIAL

## 2021-01-21 VITALS
DIASTOLIC BLOOD PRESSURE: 82 MMHG | HEIGHT: 68 IN | TEMPERATURE: 98.3 F | WEIGHT: 257.6 LBS | OXYGEN SATURATION: 98 % | BODY MASS INDEX: 39.04 KG/M2 | RESPIRATION RATE: 16 BRPM | HEART RATE: 68 BPM | SYSTOLIC BLOOD PRESSURE: 171 MMHG

## 2021-01-21 DIAGNOSIS — C79.82 METASTATIC ADENOCARCINOMA TO PROSTATE (H): ICD-10-CM

## 2021-01-21 DIAGNOSIS — C61 PROSTATE CANCER (H): Primary | ICD-10-CM

## 2021-01-21 PROCEDURE — 99214 OFFICE O/P EST MOD 30 MIN: CPT | Performed by: INTERNAL MEDICINE

## 2021-01-21 ASSESSMENT — MIFFLIN-ST. JEOR: SCORE: 1907.97

## 2021-01-21 ASSESSMENT — PAIN SCALES - GENERAL: PAINLEVEL: NO PAIN (0)

## 2021-01-21 NOTE — PROGRESS NOTES
[facility]  FOLLOW-UP VISIT NOTE    PATIENT NAME: Boaz Acosta MRN # [id number]  DATE OF VISIT: Jan 21, 2021 YOB: 1950    REFERRING PROVIDER: Rocío Boss MD  [address] [address]    CANCER TYPE: Prostate adenocarcinoma  STAGE: IV  ECOG PS: 0    TREATMENT SUMMARY:  Boaz's annual screening PSA was noted to be elevated at 17 in 2012, and 21 in the following year and it was attributed to prostatitis. Eventually, he did change his primary care physician and his repeat PSA was noted to be elevated at 31 in 04/2014. He was then referred to Urology, and was seen by Dr. Pressley with Urology in 07/2014. He was worked up with a biopsy of the prostate on 08/01//2014, which revealed adenocarcinoma of the prostate with Willis score of 4+3 involving 12 of the 12 cores. He was staged with a PET/CT scan, which did not show any evidence of distant metastasis. There was evidence of increased FDG uptake throughout the prostate with a maximum SUV of 7.1. He had a bone scan done on 08/19/2014, which did not show any obvious evidence of metastasis. Mr. Acosta underwent a robotic-assisted radical prostatectomy and lymph node sampling performed by Dr. Hesham Prieto on 10/24/2014. The pathology from this revealed adenocarcinoma of the prostate with a [place] of 7 and a tertiary pattern of 5. There was extensive prostatic tissue involvement with over 75% of the tissue with evidence of disease. There was extensive extraprostatic extension involving the apex, right and left anterior and posterior base, and soft tissue around both seminal vesicles. There was bilateral seminal vesicle invasion with involvement of vas deferens. Margins were involved with bladder neck and bilateral posterior seminal vesicle soft tissue apex. There was evidence of perineural invasion. The lymph node sample was negative for disease. His postoperative PSA  in 12/2014 remained elevated at 7.1, and all the subsequent PSAs have remained positive. His restaging scans done in January and again in 03/2015, including a CT abdomen and pelvis and bone scans, have been negative. He was referred to Radiation Oncology for consideration of salvage radiation therapy, and was seen by Dr. Murphy on 03/24/2015. Dr. Murphy referred him to Dr. Zbigniew Chu at the [facility] to have a choline-11 PET/CT scan done. The choline-11 scan done at [place] demonstrated evidence of 2 retroperitoneal lymph nodes that are mildly positive for uptake. There was no abnormal uptake in the prostate bed to suggest local recurrence. He was started on bicalutamide, and received his first dose of Lupron on 05/12/2015.   He comes for a scheduled follow up visit    Oncology Supportive Medications 5/12/2015 9/2/2015 1/4/2016   leuprolide (LUPRON DEPOT) IM 30 mg 30 mg 30 mg     CURRENT TREATMENT  Break period of intermittent androgen deprivation therapy    SUBJECTIVE   Boaz comes for a scheduled follow up visit on androgen ablation therapy.     Boaz was seen in person. He is aware of his PSA and testosterone values. He still has hot flashes.       He has been doing well and denies any other complains.        PAST MEDICAL HISTORY   1. Prostate cancer with disease metastatic to para-aortic nodes   2. Borderline HTN  3. ADALID an CPAP at night  4. GERD      CURRENT OUTPATIENT MEDICATIONS     Current Outpatient Medications   Medication Sig     ascorbic acid (VITAMIN C) 1000 MG TABS Take 1-2 tablets by mouth as needed      esomeprazole (NEXIUM) 20 MG DR capsule Take 20 mg by mouth every morning (before breakfast) Take 30-60 minutes before eating.     Multiple Vitamin (DAILY MULTIVITAMIN PO) Take 1 tablet by mouth daily.     naproxen sodium (ANAPROX) 220 MG tablet Take 220 mg by mouth 2 times daily (with meals)     UNABLE TO FIND MEDICATION NAME: Turkey tail supplement     bicalutamide (CASODEX) 50 MG tablet Take 1 tablet (50  "mg) by mouth daily (Patient not taking: Reported on 1/21/2021)     EPINEPHrine (EPIPEN 2-BRIAN) 0.3 MG/0.3ML injection Inject 0.3 mLs (0.3 mg) into the muscle once as needed     indomethacin (INDOCIN) 50 MG capsule Take 1 capsule (50 mg) by mouth 2 times daily (with meals) (Patient not taking: Reported on 1/21/2021)     Leuprolide Acetate, 4 Month, (LUPRON DEPOT, 4-MONTH, IM)      ORDER FOR DME, SET TO FAX, PATIENT WAS SET UP ON A [employer] 560 AUTO MACHINE AT [employer] WITH HEATED HUMIDITY. PATIENT HAD A CHOICE OF MASK AND CHOSE THE AIRFIT P10 SIZE MEDIUM NASAL PILLOW. HE HAS A F/U APPOINTMENT TO HIS SLEEP STUDY 2/5 WITH TURNER GLASER PA-C AND A SECOND ONE SCHEDULED IN 6 WEEKS.     predniSONE (DELTASONE) 10 MG tablet TAKE 6 TABS BY MOUTH DAILY X3DAYS, 4 TABS X3DAYS, 2 TABS X3DAYS, THEN 1 TAB DAILY X3 DAYS.     No current facility-administered medications for this visit.          ALLERGIES   No Known Allergies     REVIEW OF SYSTEMS   As above in the HPI, o/w complete 12-point ROS was negative.     PHYSICAL EXAM   BP (!) 171/82 (BP Location: Right arm)   Pulse 68   Temp 98.3  F (36.8  C) (Oral)   Resp 16   Ht 1.727 m (5' 8\")   Wt 116.8 kg (257 lb 9.6 oz)   SpO2 98%   BMI 39.17 kg/m      SpO2 Readings from Last 4 Encounters:   06/18/18 96%   10/30/17 97%   10/16/17 97%   06/19/17 96%     Wt Readings from Last 3 Encounters:   01/21/21 116.8 kg (257 lb 9.6 oz)   07/30/20 112.1 kg (247 lb 1.6 oz)   06/07/20 112.1 kg (247 lb 3.2 oz)     GEN: NAD  HEENT: PERRL, EOMI, no icterus, injection or pallor. Oropharynx is clear.  NECK: no cervical or supraclavicular lymphadenopathy  LUNGS: clear bilaterally  CV: regular, no murmurs, rubs, or gallops  ABDOMEN: soft, non-tender, non-distended, normal bowel sounds, no hepatosplenomegaly by percussion or palpation  EXT: warm, well perfused, no edema  NEURO: alert  SKIN: lesion in subxiphoid (epigastrium) as below     LABORATORY AND IMAGING STUDIES     Recent Labs   Lab Test " 01/18/21  0757 07/30/20  0733 04/27/20  0803 10/28/19  0853 06/17/19  0741    142 140 141 142   POTASSIUM 4.1 4.3 4.4 3.9 4.1   CHLORIDE 107 110* 107 107 108   CO2 28 28 29 30 28   ANIONGAP 6 4 4 4 6   BUN 15 12 14 19 21   CR 0.89 0.94 0.93 0.86 1.00   * 114* 118* 128* 123*   KERRY 9.1 9.2 9.6 8.9 8.7     No results for input(s): MAG, PHOS in the last 57647 hours.  Recent Labs   Lab Test 01/18/21 0757 07/30/20  0733 04/27/20  0803 10/28/19  0853 06/17/19  0741   WBC 6.4 5.4 6.1 6.4 6.3   HGB 13.6 14.1 14.0 12.8* 14.4    222 238 223 206   MCV 95 94 95 97 97   NEUTROPHIL 42.5 39.6 52.0 43.1 50.3     Recent Labs   Lab Test 01/18/21 0757 07/30/20  0733 04/27/20  0803 06/11/18  0742 06/11/18  0742 02/12/18  0748 10/09/17  0750   BILITOTAL 0.4 0.6 0.5   < > 0.3 0.5 0.5   ALKPHOS 50 48 41   < > 47 44 51   ALT 96* 89* 48   < > 42 49 46   AST 55* 68* 43   < > 33 38 32   ALBUMIN 3.7 3.8 3.8   < > 3.4 3.7 3.5   LDH  --   --   --   --  204 197 204    < > = values in this interval not displayed.     TSH   Date Value Ref Range Status   03/14/2005 2.22 0.4 - 5.0 mU/L Final     No results for input(s): CEA in the last 38523 hours.  Results for orders placed or performed in visit on 06/18/15   US Scrotum and Testicles    Narrative    Examination: Testicular ultrasound 6/18/2015 6:16 PM     Comparison: None.    History: Metastatic prostate cancer and left testicular mass.     Findings: There is homogeneous normal echotexture throughout the  bilateral testes. The left testicle measures 4.9 x 3.2 x 1.9 cm. The  left epididymal head measures 1.2 x 1.0 x 1.0 cm. The right testicle  measures 4.9 x 3.2 x 1.7 cm. The right epididymal head measures 1.3 x  0.7 x 1.1 cm. Doppler evaluation shows normal arterial waveforms to  the testes bilaterally. There is no hydrocele or varicocele. There is  no mass or abnormal calcifications within the testicles. Prominent  rete testes in the left testicle.     In the region of the left  epididymal tail where the patient indicates  the palpable lump, there is a round nodule measuring 0.5 x 0.6 x 0.6  cm with a hyperechoic rim. The nodule is not hypervascular.      Impression    Impression: 0.6 cm round nodule with hyperechoic rim in the region of  the left epididymal tail. This may represent a thrombosed varicocele.  The nodule is extratesticular and therefore malignancy is highly  unlikely.    I have personally reviewed the examination and initial interpretation  and I agree with the findings.    MIKE QUACH MD     Recent Labs   Lab Test 01/18/21  0757 07/30/20  0733 04/27/20  0803 10/28/19  0853 06/17/19  0741 02/18/19  0759 10/22/18  0744   PSA 0.38 8.23* 3.30 0.13 10.20* 6.42* 6.68*   TESTOSTTOTAL 671 582 754  --   --  625 611   PSA trend       ASSESSMENT AND PLAN   1. Prostate cancer with metastasis to retroperitoneal nodes with persistent PSA elevation post prostatectomy; ish 4+3 disease, tertiary score of 5  2. ECOG PS 0  3. No medical comorbiditiies    He is doing well and has no major complains.  His testosterone is normal at 671. He continues to complain of hot flashes which does not make sense. I joked that he could have PTSD from his previous leuprolide treatments. I would refer him to endocrinology if this persists. Unfortunately he would need treatment over time and would have to face castrate levels of testosterone.     His PSA had gone up to 8.2 at last visit and we treated him with leuprolide. He is off therapy now. His PSA has nicely declined to 0.38 ng/ml. We have decided that we would restart therapy once he is close to 10.  I showed him his PSA trendline over time. The break period gets shorter and shorter over time. He had a relatively short time off therapy with his previous injection.     He remembers having a lot of difficulty with bicalutamide previously and again had an episode of gout and also with leuprolide. He had hot flashes, headaches, lack of flexibility  in his achilles and generalized aching on leuprolide therapy. If he has a rapid rise in PSA we might need continuous androgen deprivation therapy.     His blood pressures are up. He understands the value of exercise and weight loss. He will follow up with his primary care for his elevated blood pressures.   Wt Readings from Last 3 Encounters:   01/21/21 116.8 kg (257 lb 9.6 oz)   07/30/20 112.1 kg (247 lb 1.6 oz)   06/07/20 112.1 kg (247 lb 3.2 oz)     I extensively reviewed about his weight gain and need to work on his weight loss. I reviewed several strategies for him. He should figure out how he would cut his calorie intake by 50%. He should come up with an exercise program. He notes that he has made a pact to work with his brother who has worse weight problem. He has not yet made any concrete plan. I suggest that he chalk out a concrete work out plan after this visit. He should exercise 2-3 hrs daily. There is no way if he cuts his caloric intake and exercises that he will not loose weight.     I will again see him in 6 months with labs a week prior to visit including CBC, CMP, PSA, testosterone.    25 minutes spent on the date of the encounter doing chart review, history and exam, documentation and further activities as noted above     Pavel Norton  ,  Division of Hematology, Oncology & Transplantation  [facility].

## 2021-01-21 NOTE — LETTER
1/21/2021         RE: Boaz Acosta  [address] [id number]        Dear Colleague,    Thank you for referring your patient, Boaz Acosta, to the [facility]. Please see a copy of my visit note below.    [facility]  FOLLOW-UP VISIT NOTE    PATIENT NAME: Boaz Acosta MRN # [id number]  DATE OF VISIT: Jan 21, 2021 YOB: 1950    REFERRING PROVIDER: Rocío Boss MD  [facility]  [address] [place] [address]    CANCER TYPE: Prostate adenocarcinoma  STAGE: IV  ECOG PS: 0    TREATMENT SUMMARY:  Boaz's annual screening PSA was noted to be elevated at 17 in 2012, and 21 in the following year and it was attributed to prostatitis. Eventually, he did change his primary care physician and his repeat PSA was noted to be elevated at 31 in 04/2014. He was then referred to Urology, and was seen by Dr. Pressley with Urology in 07/2014. He was worked up with a biopsy of the prostate on 08/01//2014, which revealed adenocarcinoma of the prostate with [place] score of 4+3 involving 12 of the 12 cores. He was staged with a PET/CT scan, which did not show any evidence of distant metastasis. There was evidence of increased FDG uptake throughout the prostate with a maximum SUV of 7.1. He had a bone scan done on 08/19/2014, which did not show any obvious evidence of metastasis. Mr. Acosta underwent a robotic-assisted radical prostatectomy and lymph node sampling performed by Dr. Hesham Prieto on 10/24/2014. The pathology from this revealed adenocarcinoma of the prostate with a Willis of 7 and a tertiary pattern of 5. There was extensive prostatic tissue involvement with over 75% of the tissue with evidence of disease. There was extensive extraprostatic extension involving the apex, right and left anterior and posterior base, and soft tissue around both seminal vesicles. There was bilateral seminal  vesicle invasion with involvement of vas deferens. Margins were involved with bladder neck and bilateral posterior seminal vesicle soft tissue apex. There was evidence of perineural invasion. The lymph node sample was negative for disease. His postoperative PSA in 12/2014 remained elevated at 7.1, and all the subsequent PSAs have remained positive. His restaging scans done in January and again in 03/2015, including a CT abdomen and pelvis and bone scans, have been negative. He was referred to Radiation Oncology for consideration of salvage radiation therapy, and was seen by Dr. Murphy on 03/24/2015. Dr. Murphy referred him to Dr. Zbigniew Chu at the [facility] to have a choline-11 PET/CT scan done. The choline-11 scan done at [place] demonstrated evidence of 2 retroperitoneal lymph nodes that are mildly positive for uptake. There was no abnormal uptake in the prostate bed to suggest local recurrence. He was started on bicalutamide, and received his first dose of Lupron on 05/12/2015.   He comes for a scheduled follow up visit    Oncology Supportive Medications 5/12/2015 9/2/2015 1/4/2016   leuprolide (LUPRON DEPOT) IM 30 mg 30 mg 30 mg     CURRENT TREATMENT  Break period of intermittent androgen deprivation therapy    SUBJECTIVE   Boaz comes for a scheduled follow up visit on androgen ablation therapy.     Boaz was seen in person. He is aware of his PSA and testosterone values. He still has hot flashes.       He has been doing well and denies any other complains.        PAST MEDICAL HISTORY   1. Prostate cancer with disease metastatic to para-aortic nodes   2. Borderline HTN  3. ADALID an CPAP at night  4. GERD      CURRENT OUTPATIENT MEDICATIONS     Current Outpatient Medications   Medication Sig     ascorbic acid (VITAMIN C) 1000 MG TABS Take 1-2 tablets by mouth as needed      esomeprazole (NEXIUM) 20 MG DR capsule Take 20 mg by mouth every morning (before breakfast) Take 30-60 minutes before eating.     Multiple Vitamin  "(DAILY MULTIVITAMIN PO) Take 1 tablet by mouth daily.     naproxen sodium (ANAPROX) 220 MG tablet Take 220 mg by mouth 2 times daily (with meals)     UNABLE TO FIND MEDICATION NAME: Turkey tail supplement     bicalutamide (CASODEX) 50 MG tablet Take 1 tablet (50 mg) by mouth daily (Patient not taking: Reported on 1/21/2021)     EPINEPHrine (EPIPEN 2-BRIAN) 0.3 MG/0.3ML injection Inject 0.3 mLs (0.3 mg) into the muscle once as needed     indomethacin (INDOCIN) 50 MG capsule Take 1 capsule (50 mg) by mouth 2 times daily (with meals) (Patient not taking: Reported on 1/21/2021)     Leuprolide Acetate, 4 Month, (LUPRON DEPOT, 4-MONTH, IM)      ORDER FOR DME, SET TO FAX, PATIENT WAS SET UP ON A [employer] AT [employer] WITH HEATED HUMIDITY. PATIENT HAD A CHOICE OF MASK AND CHOSE THE AIRFIT P10 SIZE MEDIUM NASAL PILLOW. HE HAS A F/U APPOINTMENT TO HIS SLEEP STUDY 2/5 WITH TURNER GLASER PA-C AND A SECOND ONE SCHEDULED IN 6 WEEKS.     predniSONE (DELTASONE) 10 MG tablet TAKE 6 TABS BY MOUTH DAILY X3DAYS, 4 TABS X3DAYS, 2 TABS X3DAYS, THEN 1 TAB DAILY X3 DAYS.     No current facility-administered medications for this visit.          ALLERGIES   No Known Allergies     REVIEW OF SYSTEMS   As above in the HPI, o/w complete 12-point ROS was negative.     PHYSICAL EXAM   BP (!) 171/82 (BP Location: Right arm)   Pulse 68   Temp 98.3  F (36.8  C) (Oral)   Resp 16   Ht 1.727 m (5' 8\")   Wt 116.8 kg (257 lb 9.6 oz)   SpO2 98%   BMI 39.17 kg/m      SpO2 Readings from Last 4 Encounters:   06/18/18 96%   10/30/17 97%   10/16/17 97%   06/19/17 96%     Wt Readings from Last 3 Encounters:   01/21/21 116.8 kg (257 lb 9.6 oz)   07/30/20 112.1 kg (247 lb 1.6 oz)   06/07/20 112.1 kg (247 lb 3.2 oz)     GEN: NAD  HEENT: PERRL, EOMI, no icterus, injection or pallor. Oropharynx is clear.  NECK: no cervical or supraclavicular lymphadenopathy  LUNGS: clear bilaterally  CV: regular, no murmurs, rubs, or gallops  ABDOMEN: " soft, non-tender, non-distended, normal bowel sounds, no hepatosplenomegaly by percussion or palpation  EXT: warm, well perfused, no edema  NEURO: alert  SKIN: lesion in subxiphoid (epigastrium) as below     LABORATORY AND IMAGING STUDIES     Recent Labs   Lab Test 01/18/21 0757 07/30/20  0733 04/27/20  0803 10/28/19  0853 06/17/19  0741    142 140 141 142   POTASSIUM 4.1 4.3 4.4 3.9 4.1   CHLORIDE 107 110* 107 107 108   CO2 28 28 29 30 28   ANIONGAP 6 4 4 4 6   BUN 15 12 14 19 21   CR 0.89 0.94 0.93 0.86 1.00   * 114* 118* 128* 123*   KERRY 9.1 9.2 9.6 8.9 8.7     No results for input(s): MAG, PHOS in the last 04029 hours.  Recent Labs   Lab Test 01/18/21 0757 07/30/20  0733 04/27/20  0803 10/28/19  0853 06/17/19  0741   WBC 6.4 5.4 6.1 6.4 6.3   HGB 13.6 14.1 14.0 12.8* 14.4    222 238 223 206   MCV 95 94 95 97 97   NEUTROPHIL 42.5 39.6 52.0 43.1 50.3     Recent Labs   Lab Test 01/18/21 0757 07/30/20  0733 04/27/20  0803 06/11/18  0742 06/11/18  0742 02/12/18  0748 10/09/17  0750   BILITOTAL 0.4 0.6 0.5   < > 0.3 0.5 0.5   ALKPHOS 50 48 41   < > 47 44 51   ALT 96* 89* 48   < > 42 49 46   AST 55* 68* 43   < > 33 38 32   ALBUMIN 3.7 3.8 3.8   < > 3.4 3.7 3.5   LDH  --   --   --   --  204 197 204    < > = values in this interval not displayed.     TSH   Date Value Ref Range Status   03/14/2005 2.22 0.4 - 5.0 mU/L Final     No results for input(s): CEA in the last 15377 hours.  Results for orders placed or performed in visit on 06/18/15   US Scrotum and Testicles    Narrative    Examination: Testicular ultrasound 6/18/2015 6:16 PM     Comparison: None.    History: Metastatic prostate cancer and left testicular mass.     Findings: There is homogeneous normal echotexture throughout the  bilateral testes. The left testicle measures 4.9 x 3.2 x 1.9 cm. The  left epididymal head measures 1.2 x 1.0 x 1.0 cm. The right testicle  measures 4.9 x 3.2 x 1.7 cm. The right epididymal head measures 1.3 x  0.7 x  1.1 cm. Doppler evaluation shows normal arterial waveforms to  the testes bilaterally. There is no hydrocele or varicocele. There is  no mass or abnormal calcifications within the testicles. Prominent  rete testes in the left testicle.     In the region of the left epididymal tail where the patient indicates  the palpable lump, there is a round nodule measuring 0.5 x 0.6 x 0.6  cm with a hyperechoic rim. The nodule is not hypervascular.      Impression    Impression: 0.6 cm round nodule with hyperechoic rim in the region of  the left epididymal tail. This may represent a thrombosed varicocele.  The nodule is extratesticular and therefore malignancy is highly  unlikely.    I have personally reviewed the examination and initial interpretation  and I agree with the findings.    MIKE QUACH MD     Recent Labs   Lab Test 01/18/21  0757 07/30/20  0733 04/27/20  0803 10/28/19  0853 06/17/19  0741 02/18/19  0759 10/22/18  0744   PSA 0.38 8.23* 3.30 0.13 10.20* 6.42* 6.68*   TESTOSTTOTAL 671 582 754  --   --  625 611   PSA trend       ASSESSMENT AND PLAN   1. Prostate cancer with metastasis to retroperitoneal nodes with persistent PSA elevation post prostatectomy; ish 4+3 disease, tertiary score of 5  2. ECOG PS 0  3. No medical comorbiditiies    He is doing well and has no major complains.  His testosterone is normal at 671. He continues to complain of hot flashes which does not make sense. I joked that he could have PTSD from his previous leuprolide treatments. I would refer him to endocrinology if this persists. Unfortunately he would need treatment over time and would have to face castrate levels of testosterone.     His PSA had gone up to 8.2 at last visit and we treated him with leuprolide. He is off therapy now. His PSA has nicely declined to 0.38 ng/ml. We have decided that we would restart therapy once he is close to 10.  I showed him his PSA trendline over time. The break period gets shorter and shorter  over time. He had a relatively short time off therapy with his previous injection.     He remembers having a lot of difficulty with bicalutamide previously and again had an episode of gout and also with leuprolide. He had hot flashes, headaches, lack of flexibility in his achilles and generalized aching on leuprolide therapy. If he has a rapid rise in PSA we might need continuous androgen deprivation therapy.     His blood pressures are up. He understands the value of exercise and weight loss. He will follow up with his primary care for his elevated blood pressures.   Wt Readings from Last 3 Encounters:   01/21/21 116.8 kg (257 lb 9.6 oz)   07/30/20 112.1 kg (247 lb 1.6 oz)   06/07/20 112.1 kg (247 lb 3.2 oz)     I extensively reviewed about his weight gain and need to work on his weight loss. I reviewed several strategies for him. He should figure out how he would cut his calorie intake by 50%. He should come up with an exercise program. He notes that he has made a pact to work with his brother who has worse weight problem. He has not yet made any concrete plan. I suggest that he chalk out a concrete work out plan after this visit. He should exercise 2-3 hrs daily. There is no way if he cuts his caloric intake and exercises that he will not loose weight.     I will again see him in 6 months with labs a week prior to visit including CBC, CMP, PSA, testosterone.    25 minutes spent on the date of the encounter doing chart review, history and exam, documentation and further activities as noted above     Pavel Norton  ,  Division of Hematology, Oncology & Transplantation  [facility].         Again, thank you for allowing me to participate in the care of your patient.        Sincerely,        Pavel Norton MD

## 2021-01-21 NOTE — NURSING NOTE
"Oncology Rooming Note    January 21, 2021 8:36 AM   Boaz Acosta is a 70 year old male who presents for:    Chief Complaint   Patient presents with     Oncology Clinic Visit     6 month follow up     Initial Vitals: BP (!) 171/82 (BP Location: Right arm)   Pulse 68   Temp 98.3  F (36.8  C) (Oral)   Resp 16   Ht 1.727 m (5' 8\")   Wt 116.8 kg (257 lb 9.6 oz)   SpO2 98%   BMI 39.17 kg/m   Estimated body mass index is 39.17 kg/m  as calculated from the following:    Height as of this encounter: 1.727 m (5' 8\").    Weight as of this encounter: 116.8 kg (257 lb 9.6 oz). Body surface area is 2.37 meters squared.  No Pain (0) Comment: Data Unavailable   No LMP for male patient.  Allergies reviewed: Yes  Medications reviewed: Yes    Medications: Medication refills not needed today.  Pharmacy name entered into [facility]:    [id number] [place], [place] - [address] [facility] [id number] [place], [place] - [address] [address]      Donna Parnell LPN              "

## 2021-03-25 ENCOUNTER — TELEPHONE (OUTPATIENT)
Dept: FAMILY MEDICINE | Facility: CLINIC | Age: 71
End: 2021-03-25

## 2021-03-25 DIAGNOSIS — G47.33 OSA (OBSTRUCTIVE SLEEP APNEA): Primary | Chronic | ICD-10-CM

## 2021-03-25 NOTE — TELEPHONE ENCOUNTER
Patient called today.    Patient would like a referral [facility]    Please last seen with same clinic 6 years ago, and is re establishing care.    Thank you.    Central Scheduling  Jeanie COPE

## 2021-04-21 ENCOUNTER — VIRTUAL VISIT (OUTPATIENT)
Dept: SLEEP MEDICINE | Facility: CLINIC | Age: 71
End: 2021-04-21
Payer: COMMERCIAL

## 2021-04-21 VITALS — WEIGHT: 250 LBS | HEIGHT: 68 IN | BODY MASS INDEX: 37.89 KG/M2

## 2021-04-21 DIAGNOSIS — G47.33 OSA (OBSTRUCTIVE SLEEP APNEA): Primary | ICD-10-CM

## 2021-04-21 PROBLEM — M1A.0710 IDIOPATHIC CHRONIC GOUT OF RIGHT FOOT WITHOUT TOPHUS: Chronic | Status: ACTIVE | Noted: 2019-09-25

## 2021-04-21 PROCEDURE — 99443 PR PHYSICIAN TELEPHONE EVALUATION 21-30 MIN: CPT | Mod: 95 | Performed by: PHYSICIAN ASSISTANT

## 2021-04-21 ASSESSMENT — MIFFLIN-ST. JEOR: SCORE: 1873.36

## 2021-04-21 NOTE — PROGRESS NOTES
"Boaz Acosta is a 70 year old male being evaluated via a billable telephone visit.     \"This telephone visit will be conducted via a call between you and your physician/provider. We have found that certain health care needs can be provided without the need for an in-person visit or physical exam.  This service lets us provide the care you need with a telephone conversation.  If a prescription is necessary we can send it directly to your pharmacy.  If lab work is needed we can place an order for that and you can then stop by our lab to have the test done at a later time.\"    Telephone visits are billed at different rates depending on your insurance coverage.  Please reach out to your insurance provider with any questions.    Patient has given verbal consent for  a Telephone visit? Yes    What telephone number would you like your provider to contact at at:  [phone]    How would you like to obtain your AVS? Vlad Herrera MA    Telephone Visit Details:     Telephone Visit Start Time: 9:35 AM    Telephone Visit End Time:9:57 AM       Sleep Consultation:    Date on this visit: 4/21/2021    Boaz Acosta is sent by No ref. provider found for a sleep consultation.    Primary Physician: Gianluca Powell     CC: Re-establish care    Boaz Acosta initially presented for snoring, snorting, gasping, sleep maintenance insomnia, observed apnea and fatigue.  LOV in 2016    Study Date: 1/11/2015- (230.0 lbs) Sleep Associated Hypoventilation was not definitively present with a baseline PCO2 of ?43mmHg and maximum PCO2 of 50  mmHg during REM sleep on CPAP. The lowest oxygen saturation was 80.0%. Apnea/Hypopnea Index was 58.8 events per hour.  The REM AHI was n/a. RDI was 60.2. CPAP optimal pressure was 9 with an AHI of 0. Supine REM was seen at this pressure.. During the treatment portion of the study, PLM index was 25.3 per hour     He report his CPAP is >5 years and the humidifying system does " not work well. It does not allow for remote download.     Overall, the patient rates his experience with PAP as 10 (0 poor, 10 great). The mask is comfortable. The mask is not leaking.  He is not snoring with the mask on. He is not having gasp arousals. He is not having any oral or nasal dryness. The pressure settings are comfortable.    His PAP interface is Nasal Pillows.    Bedtime is typically 9-11. Usually it takes about 15-20 minutes to fall asleep with the mask on. Wake time is typically 5-7 AM.  Patient is using PAP therapy 11 hours per night. The patient is usually getting 7-8 hours of sleep per night. Wears CPAP while listening to the radio in bed.     He does feel rested in the morning.    Total score - [place]: 2 (4/21/2021  8:00 AM)      CARMEN Total Score: 7      Respironics  CPAP 9 cmH2O 30 day usage data:  100% of days with > 4 hours of use. 0/30 days with no use.   Average use 11 hours and 39 minutes per day.   Average % of night in large leak 0%.    AHI 0.7 events per hour.     Patient does not watch TV in bed.     Boaz does not do shift work.  He retired in 2017.      Boaz does not nap.     Allergies:    No Known Allergies    Medications:    Current Outpatient Medications   Medication Sig Dispense Refill     ascorbic acid (VITAMIN C) 1000 MG TABS Take 1-2 tablets by mouth as needed        bicalutamide (CASODEX) 50 MG tablet Take 1 tablet (50 mg) by mouth daily 15 tablet 0     EPINEPHrine (EPIPEN 2-BRIAN) 0.3 MG/0.3ML injection Inject 0.3 mLs (0.3 mg) into the muscle once as needed       indomethacin (INDOCIN) 50 MG capsule Take 1 capsule (50 mg) by mouth 2 times daily (with meals) 60 capsule 1     Leuprolide Acetate, 4 Month, (LUPRON DEPOT, 4-MONTH, IM)        Multiple Vitamin (DAILY MULTIVITAMIN PO) Take 1 tablet by mouth daily.       naproxen sodium (ANAPROX) 220 MG tablet Take 220 mg by mouth 2 times daily (with meals)       ORDER FOR DME, SET TO FAX, PATIENT WAS SET UP ON A RESPIRNaehas 560 AUTO  MACHINE AT PRESSURE 9CMH2O WITH HEATED HUMIDITY. PATIENT HAD A CHOICE OF MASK AND CHOSE THE AIRFIT P10 SIZE MEDIUM NASAL PILLOW. HE HAS A F/U APPOINTMENT TO HIS SLEEP STUDY 2/5 WITH TURNER GLASER PA-C AND A SECOND ONE SCHEDULED IN 6 WEEKS.       UNABLE TO FIND MEDICATION NAME: Turkey tail supplement         Problem List:  Patient Active Problem List    Diagnosis Date Noted     Idiopathic chronic 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 09/25/2019     Priority: Medium     Obesity (BMI 35.0-39.9) with comorbidity (H) 09/11/2019     Priority: Medium     Anaphylactic reaction 10/20/2015     Priority: Medium     Unknown       ADALID (obstructive sleep apnea)- severe (AHI 58) 01/14/2015     Priority: Medium     Study Date: 1/11/2015- (230.0 lbs) Sleep Associated Hypoventilation was not definitively present with a baseline PCO2 of ?43mmHg and maximum PCO2 of 50  mmHg during REM sleep on CPAP. The lowest oxygen saturation was 80.0%. Apnea/Hypopnea Index was 58.8 events per hour.  The REM AHI was n/a. RDI was 60.2. CPAP optimal pressure was 9 with an AHI of 0. Supine REM was seen at this pressure.. During the treatment portion of the study, PLM index was 25.3 per hour       Obesity 12/11/2014     Priority: Medium     Prostate cancer (H) 10/24/2014     Priority: Medium     Hypertension goal BP (blood pressure) < 140/90 09/22/2014     Priority: Medium     CARDIOVASCULAR SCREENING; LDL GOAL LESS THAN 130 06/03/2014     Priority: Medium     History of colonic polyps 05/30/2014     Priority: Medium     Other urinary problems 05/30/2014     Priority: Medium        Past Medical/Surgical History:  Past Medical History:   Diagnosis Date     Elevated prostate specific antigen (PSA) 5/30/2014     History of blood transfusion      Hypertension several years    mainly with mechanical testing     Idiopathic chronic 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 9/25/2019     Lyme disease     2 times     Prostate cancer (H) 10/24/2014    Prostate resection      SCC (squamous cell carcinoma), face 12/12/2012     Past Surgical History:   Procedure Laterality Date     COLONOSCOPY      on record     DAVINCI LYMPHADENECTOMY Bilateral 10/24/2014    Procedure: DAVINCI LYMPHADENECTOMY;  Surgeon: Hesham Prieto MD;  Location: [facility]     DAVINCI PROSTATECTOMY N/A 10/24/2014    Procedure: DAVINCI PROSTATECTOMY;  Surgeon: Hesham Prieto MD;  Location: [facility] OR     HERNIA REPAIR      umblical 2007     MOHS MICROGRAPHIC PROCEDURE       ORTHOPEDIC SURGERY Right     right femur repair     ORTHOPEDIC SURGERY Right     hardware removed right femur     ORTHOPEDIC SURGERY Left     tendon bicep repair       Social History:  Social History     Socioeconomic History     Marital status:      Spouse name: Not on file     Number of children: Not on file     Years of education: Not on file     Highest education level: Not on file   Occupational History     Employer: UNEMPLOYED   Social Needs     Financial resource strain: Not on file     Food insecurity     Worry: Not on file     Inability: Not on file     Transportation needs     Medical: Not on file     Non-medical: Not on file   Tobacco Use     Smoking status: Former Smoker     Years: 3.00     Types: Cigars     Smokeless tobacco: Never Used     Tobacco comment: Only occassional cigars   Substance and Sexual Activity     Alcohol use: Yes     Comment: 1-2 beers or a glass of wine per day     Drug use: No     Sexual activity: Not Currently   Lifestyle     Physical activity     Days per week: Not on file     Minutes per session: Not on file     Stress: Not on file   Relationships     Social connections     Talks on phone: Not on file     Gets together: Not on file     Attends Restorationist service: Not on file     Active member of club or organization: Not on file     Attends meetings of clubs or organizations: Not on file     Relationship status: Not on file     Intimate partner violence     Fear of current or ex partner:  Not on file     Emotionally abused: Not on file     Physically abused: Not on file     Forced sexual activity: Not on file   Other Topics Concern     Parent/sibling w/ CABG, MI or angioplasty before 65F 55M? No   Social History Narrative     Not on file       Family History:  Family History   Problem Relation Age of Onset     Psychotic Disorder Son      BLAKE. Father      Coronary Artery Disease Father         ac bypass and other     Hypertension Father      Breast Cancer Sister      Breast Cancer Sister      Cerebrovascular Disease No family hx of      Melanoma No family hx of      Skin Cancer No family hx of        Review of Systems:  A complete review of systems reviewed by me is negative with the exeption of what has been mentioned in the history of present illness.  CONSTITUTIONAL: NEGATIVE for weight gain/loss, fever, chills, sweats or night sweats, drug allergies.  EYES: NEGATIVE for changes in vision, blind spots, double vision.  ENT: NEGATIVE for ear pain, sore throat, sinus pain, post-nasal drip, runny nose, bloody nose  CARDIAC: NEGATIVE for fast heartbeats or fluttering in chest, chest pain or pressure, breathlessness when lying flat, swollen legs or swollen feet.  NEUROLOGIC: NEGATIVE headaches, weakness or numbness in the arms or legs.  DERMATOLOGIC: NEGATIVE for rashes, new moles or change in mole(s)  PULMONARY: NEGATIVE SOB at rest, SOB with activity, dry cough, productive cough, coughing up blood, wheezing or whistling when breathing.    GASTROINTESTINAL: NEGATIVE for nausea or vomitting, loose or watery stools, fat or grease in stools, constipation, abdominal pain, bowel movements black in color or blood noted.  GENITOURINARY: NEGATIVE for pain during urination, blood in urine, urinating more frequently than usual, irregular menstrual periods.  MUSCULOSKELETAL: NEGATIVE for muscle pain, bone or joint pain, swollen joints.  ENDOCRINE: NEGATIVE for increased thirst or urination, diabetes.  LYMPHATIC:  "NEGATIVE for swollen lymph nodes, lumps or bumps in the breasts or nipple discharge.    Physical Examination:  Vitals: Ht 1.727 m (5' 7.99\")   Wt 113.4 kg (250 lb)   BMI 38.02 kg/m    BMI= Body mass index is 38.02 kg/m .         Belle Total Score 4/21/2021   Total score - Belle 2       CARMEN Total Score: 7 (04/21/21 0800)    Last Comprehensive Metabolic Panel:  Sodium   Date Value Ref Range Status   01/18/2021 141 133 - 144 mmol/L Final     Potassium   Date Value Ref Range Status   01/18/2021 4.1 3.4 - 5.3 mmol/L Final     Chloride   Date Value Ref Range Status   01/18/2021 107 94 - 109 mmol/L Final     Carbon Dioxide   Date Value Ref Range Status   01/18/2021 28 20 - 32 mmol/L Final     Anion Gap   Date Value Ref Range Status   01/18/2021 6 3 - 14 mmol/L Final     Glucose   Date Value Ref Range Status   01/18/2021 128 (H) 70 - 99 mg/dL Final     Urea Nitrogen   Date Value Ref Range Status   01/18/2021 15 7 - 30 mg/dL Final     Creatinine   Date Value Ref Range Status   01/18/2021 0.89 0.66 - 1.25 mg/dL Final     GFR Estimate   Date Value Ref Range Status   01/18/2021 87 >60 mL/min/[1.73_m2] Final     Comment:     Non  GFR Calc  Starting 12/18/2018, serum creatinine based estimated GFR (eGFR) will be   calculated using the Chronic Kidney Disease Epidemiology Collaboration   (CKD-EPI) equation.       Calcium   Date Value Ref Range Status   01/18/2021 9.1 8.5 - 10.1 mg/dL Final       Impression/Plan:    Severe obstructive seep apnea-  Excellent CPAP compliance and AHI  is well controlled on CPAP 9 cm/H20. Daytime symptoms are improved.   Continue current treatment.   He needs a new CPAP.  Comprehensive DME    Boaz Acosta will follow up in about 8 weeks     Renetta Kim PA-C    "

## 2021-04-21 NOTE — PATIENT INSTRUCTIONS
Your BMI is Body mass index is 38.02 kg/m .  Weight management is a personal decision.  If you are interested in exploring weight loss strategies, the following discussion covers the approaches that may be successful. Body mass index (BMI) is one way to tell whether you are at a healthy weight, overweight, or obese. It measures your weight in relation to your height.  A BMI of 18.5 to 24.9 is in the healthy range. A person with a BMI of 25 to 29.9 is considered overweight, and someone with a BMI of 30 or greater is considered obese. More than two-thirds of American adults are considered overweight or obese.  Being overweight or obese increases the risk for further weight gain. Excess weight may lead to heart disease and diabetes.  Creating and following plans for healthy eating and physical activity may help you improve your health.  Weight control is part of healthy lifestyle and includes exercise, emotional health, and healthy eating habits. Careful eating habits lifelong are the mainstay of weight control. Though there are significant health benefits from weight loss, long-term weight loss with diet alone may be very difficult to achieve- studies show long-term success with dietary management in less than 10% of people. Attaining a healthy weight may be especially difficult to achieve in those with severe obesity. In some cases, medications, devices and surgical management might be considered.  What can you do?  If you are overweight or obese and are interested in methods for weight loss, you should discuss this with your provider.     Consider reducing daily calorie intake by 500 calories.     Keep a food journal.     Avoiding skipping meals, consider cutting portions instead.    Diet combined with exercise helps maintain muscle while optimizing fat loss. Strength training is particularly important for building and maintaining muscle mass. Exercise helps reduce stress, increase energy, and improves fitness.  Increasing exercise without diet control, however, may not burn enough calories to loose weight.       Start walking three days a week 10-20 minutes at a time    Work towards walking thirty minutes five days a week     Eventually, increase the speed of your walking for 1-2 minutes at time    In addition, we recommend that you review healthy lifestyles and methods for weight loss available through the National Institutes of Health patient information sites:  http://win.niddk.nih.gov/publications/index.htm    And look into health and wellness programs that may be available through your health insurance provider, employer, local community center, or deepak club.    Weight management plan: Patient was referred to their PCP to discuss a diet and exercise plan.

## 2021-05-04 ENCOUNTER — DOCUMENTATION ONLY (OUTPATIENT)
Dept: SLEEP MEDICINE | Facility: CLINIC | Age: 71
End: 2021-05-04
Payer: COMMERCIAL

## 2021-05-04 NOTE — PROGRESS NOTES
Patient was offered choice of vendor and chose [facility].  Patient Boaz Acosta was set up at [facility] on May 4, 2021. Patient received a Resmed AirSense 10 Auto. Pressures were set at 9 cm H2O.   Patient s ramp is 5 cm H2O for Off and FLEX/EPR is 2.  Patient received a Resmed Mask name: AirFit P10  Pillow mask size Medium, heated tubing and heated humidifier.  Patient does need to meet compliance. Patient has a follow up on TBD (6/3/21 - 8/2/21) with LEONELA España

## 2021-05-10 ENCOUNTER — DOCUMENTATION ONLY (OUTPATIENT)
Dept: SLEEP MEDICINE | Facility: CLINIC | Age: 71
End: 2021-05-10

## 2021-06-28 ENCOUNTER — VIRTUAL VISIT (OUTPATIENT)
Dept: SLEEP MEDICINE | Facility: CLINIC | Age: 71
End: 2021-06-28
Payer: COMMERCIAL

## 2021-06-28 DIAGNOSIS — G47.33 OSA (OBSTRUCTIVE SLEEP APNEA): Primary | ICD-10-CM

## 2021-06-28 PROCEDURE — 99442 PR PHYSICIAN TELEPHONE EVALUATION 11-20 MIN: CPT | Mod: 95 | Performed by: PHYSICIAN ASSISTANT

## 2021-06-28 NOTE — PROGRESS NOTES
"Boaz Acosta is a 70 year old male being evaluated via a billable telephone visit.      \"This telephone visit will be conducted via a call between you and your physician/provider. We have found that certain health care needs can be provided without the need for an in-person visit or physical exam.  This service lets us provide the care you need with a telephone conversation.  If a prescription is necessary we can send it directly to your pharmacy.  If lab work is needed we can place an order for that and you can then stop by our lab to have the test done at a later time.\"     Telephone visits are billed at different rates depending on your insurance coverage.  Please reach out to your insurance provider with any questions.     Patient has given verbal consent for  a Telephone visit? Yes     What telephone number would you like your provider to contact at at:  [phone]     How would you like to obtain your AVS? Coreyhart      Telephone Visit Details:     Telephone Visit Start Time: 1:03 PM     Telephone Visit End Time:1:15 AM       Obstructive Sleep Apnea - PAP Follow-Up Visit:    Chief Complaint   Patient presents with     CPAP Follow Up       Boaz Acosta comes in today for follow-up of their severe sleep apnea, managed with CPAP.     Boaz Acosta initially presented for snoring, snorting, gasping, sleep maintenance insomnia, observed apnea and fatigue.     Study Date: 1/11/2015- (230.0 lbs) Sleep Associated Hypoventilation was not definitively present with a baseline PCO2 of ?43mmHg and maximum PCO2 of 50  mmHg during REM sleep on CPAP. The lowest oxygen saturation was 80.0%. Apnea/Hypopnea Index was 58.8 events per hour.  The REM AHI was n/a. RDI was 60.2. CPAP optimal pressure was 9 with an AHI of 0. Supine REM was seen at this pressure.. During the treatment portion of the study, PLM index was 25.3 per hour     May 4, 2021. Patient received a NEW Resmed AirSense 10 Auto. Pressures were set at 9 cm H2O. "     DME: [facility]    Overall, the patient rates his experience with PAP as 10 (0 poor, 10 great). The mask is comfortable. The mask is not leaking.  He is not snoring with the mask on. He is not having gasp arousals. He is not having any oral or nasal dryness. The pressure settings are comfortable.      Bedtime is typically 8-10 PM. Usually it takes about 10 minutes to fall asleep with the mask on. Wake time is typically 5:30 AM.  Patient is using PAP therapy 7-8 hours per night. The patient is usually getting 7-8 hours of sleep per night.    He does feel rested in the morning.    Total score - [place]eredith: 1 (6/22/2021  9:02 AM)      No data recorded    ResMed   CPAP 9.0 cmH2O 30 day usage data:  100% of days with > 4 hours of use. 0/30 days with no use.   Average use 630 minutes per day.   95%ile Leak 14.55 L/min.   AHI 0.62 events per hour.       Past medical/surgical history, family history, social history, medications and allergies were reviewed.      Problem List:  Patient Active Problem List    Diagnosis Date Noted     Idiopathic chronic 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 09/25/2019     Priority: Medium     Obesity (BMI 35.0-39.9) with comorbidity (H) 09/11/2019     Priority: Medium     Anaphylactic reaction 10/20/2015     Priority: Medium     Unknown       ADALID (obstructive sleep apnea)- severe (AHI 58) 01/14/2015     Priority: Medium     Study Date: 1/11/2015- (230.0 lbs) Sleep Associated Hypoventilation was not definitively present with a baseline PCO2 of ?43mmHg and maximum PCO2 of 50  mmHg during REM sleep on CPAP. The lowest oxygen saturation was 80.0%. Apnea/Hypopnea Index was 58.8 events per hour.  The REM AHI was n/a. RDI was 60.2. CPAP optimal pressure was 9 with an AHI of 0. Supine REM was seen at this pressure.. During the treatment portion of the study, PLM index was 25.3 per hour       Obesity 12/11/2014     Priority: Medium     Prostate cancer (H) 10/24/2014     Priority: Medium     Hypertension goal BP  (blood pressure) < 140/90 09/22/2014     Priority: Medium     CARDIOVASCULAR SCREENING; LDL GOAL LESS THAN 130 06/03/2014     Priority: Medium     History of colonic polyps 05/30/2014     Priority: Medium     Other urinary problems 05/30/2014     Priority: Medium        There were no vitals taken for this visit.    Impression/Plan:  Severe obstructive sleep apnea.  Tolerating PAP well. Daytime symptoms are improved.   Continue current CPAP.   Comprehensive DME    Boaz Acosta will follow up in about 2 years or sooner if any concerns.    Renetta Kim PA-C

## 2021-07-12 ENCOUNTER — LAB (OUTPATIENT)
Dept: LAB | Facility: CLINIC | Age: 71
End: 2021-07-12
Payer: COMMERCIAL

## 2021-07-12 DIAGNOSIS — C61 PROSTATE CANCER (H): ICD-10-CM

## 2021-07-12 LAB
ALBUMIN SERPL-MCNC: 3.5 G/DL (ref 3.4–5)
ALP SERPL-CCNC: 54 U/L (ref 40–150)
ALT SERPL W P-5'-P-CCNC: 89 U/L (ref 0–70)
ANION GAP SERPL CALCULATED.3IONS-SCNC: 3 MMOL/L (ref 3–14)
AST SERPL W P-5'-P-CCNC: 62 U/L (ref 0–45)
BASOPHILS # BLD AUTO: 0.1 10E3/UL (ref 0–0.2)
BASOPHILS NFR BLD AUTO: 1 %
BILIRUB SERPL-MCNC: 0.9 MG/DL (ref 0.2–1.3)
BUN SERPL-MCNC: 12 MG/DL (ref 7–30)
CALCIUM SERPL-MCNC: 9.6 MG/DL (ref 8.5–10.1)
CHLORIDE BLD-SCNC: 104 MMOL/L (ref 94–109)
CO2 SERPL-SCNC: 30 MMOL/L (ref 20–32)
CREAT SERPL-MCNC: 1.01 MG/DL (ref 0.66–1.25)
EOSINOPHIL # BLD AUTO: 0.1 10E3/UL (ref 0–0.7)
EOSINOPHIL NFR BLD AUTO: 2 %
ERYTHROCYTE [DISTWIDTH] IN BLOOD BY AUTOMATED COUNT: 13.2 % (ref 10–15)
GFR SERPL CREATININE-BSD FRML MDRD: 76 ML/MIN/1.73M2
GLUCOSE BLD-MCNC: 118 MG/DL (ref 70–99)
HCT VFR BLD AUTO: 42.4 % (ref 40–53)
HGB BLD-MCNC: 14.3 G/DL (ref 13.3–17.7)
IMM GRANULOCYTES # BLD: 0 10E3/UL
IMM GRANULOCYTES NFR BLD: 0 %
LDH SERPL L TO P-CCNC: 217 U/L (ref 85–227)
LYMPHOCYTES # BLD AUTO: 2.1 10E3/UL (ref 0.8–5.3)
LYMPHOCYTES NFR BLD AUTO: 35 %
MCH RBC QN AUTO: 32.3 PG (ref 26.5–33)
MCHC RBC AUTO-ENTMCNC: 33.7 G/DL (ref 31.5–36.5)
MCV RBC AUTO: 96 FL (ref 78–100)
MONOCYTES # BLD AUTO: 0.7 10E3/UL (ref 0–1.3)
MONOCYTES NFR BLD AUTO: 11 %
NEUTROPHILS # BLD AUTO: 3 10E3/UL (ref 1.6–8.3)
NEUTROPHILS NFR BLD AUTO: 51 %
NRBC # BLD AUTO: 0 10E3/UL
NRBC BLD AUTO-RTO: 0 /100
PLATELET # BLD AUTO: 228 10E3/UL (ref 150–450)
POTASSIUM BLD-SCNC: 4.1 MMOL/L (ref 3.4–5.3)
PROT SERPL-MCNC: 7.5 G/DL (ref 6.8–8.8)
PSA SERPL-MCNC: 6.79 UG/L (ref 0–4)
RBC # BLD AUTO: 4.43 10E6/UL (ref 4.4–5.9)
SODIUM SERPL-SCNC: 137 MMOL/L (ref 133–144)
WBC # BLD AUTO: 6 10E3/UL (ref 4–11)

## 2021-07-12 PROCEDURE — 80053 COMPREHEN METABOLIC PANEL: CPT

## 2021-07-12 PROCEDURE — 84153 ASSAY OF PSA TOTAL: CPT

## 2021-07-12 PROCEDURE — 36415 COLL VENOUS BLD VENIPUNCTURE: CPT

## 2021-07-12 PROCEDURE — 84403 ASSAY OF TOTAL TESTOSTERONE: CPT

## 2021-07-12 PROCEDURE — 83615 LACTATE (LD) (LDH) ENZYME: CPT

## 2021-07-12 PROCEDURE — 85025 COMPLETE CBC W/AUTO DIFF WBC: CPT

## 2021-07-14 LAB — TESTOST SERPL-MCNC: 595 NG/DL (ref 240–950)

## 2021-07-15 ENCOUNTER — OFFICE VISIT (OUTPATIENT)
Dept: FAMILY MEDICINE | Facility: CLINIC | Age: 71
End: 2021-07-15
Payer: COMMERCIAL

## 2021-07-15 ENCOUNTER — NURSE TRIAGE (OUTPATIENT)
Dept: FAMILY MEDICINE | Facility: CLINIC | Age: 71
End: 2021-07-15

## 2021-07-15 ENCOUNTER — ONCOLOGY VISIT (OUTPATIENT)
Dept: ONCOLOGY | Facility: CLINIC | Age: 71
End: 2021-07-15
Attending: INTERNAL MEDICINE
Payer: COMMERCIAL

## 2021-07-15 VITALS
HEART RATE: 60 BPM | OXYGEN SATURATION: 96 % | BODY MASS INDEX: 37.74 KG/M2 | TEMPERATURE: 98.5 F | SYSTOLIC BLOOD PRESSURE: 200 MMHG | WEIGHT: 249 LBS | DIASTOLIC BLOOD PRESSURE: 80 MMHG | HEIGHT: 68 IN

## 2021-07-15 VITALS
RESPIRATION RATE: 16 BRPM | TEMPERATURE: 98.9 F | SYSTOLIC BLOOD PRESSURE: 202 MMHG | OXYGEN SATURATION: 98 % | DIASTOLIC BLOOD PRESSURE: 98 MMHG | HEART RATE: 61 BPM | HEIGHT: 68 IN | BODY MASS INDEX: 37.74 KG/M2 | WEIGHT: 249 LBS

## 2021-07-15 DIAGNOSIS — E66.01 MORBID OBESITY (H): ICD-10-CM

## 2021-07-15 DIAGNOSIS — Z12.11 SPECIAL SCREENING FOR MALIGNANT NEOPLASMS, COLON: ICD-10-CM

## 2021-07-15 DIAGNOSIS — R73.9 HYPERGLYCEMIA: ICD-10-CM

## 2021-07-15 DIAGNOSIS — Z12.11 SCREEN FOR COLON CANCER: ICD-10-CM

## 2021-07-15 DIAGNOSIS — C61 PROSTATE CANCER (H): Primary | ICD-10-CM

## 2021-07-15 DIAGNOSIS — I16.0 HYPERTENSIVE URGENCY: Primary | ICD-10-CM

## 2021-07-15 DIAGNOSIS — Z11.59 NEED FOR HEPATITIS C SCREENING TEST: ICD-10-CM

## 2021-07-15 PROCEDURE — 99214 OFFICE O/P EST MOD 30 MIN: CPT | Performed by: FAMILY MEDICINE

## 2021-07-15 PROCEDURE — 99214 OFFICE O/P EST MOD 30 MIN: CPT | Mod: 25 | Performed by: INTERNAL MEDICINE

## 2021-07-15 PROCEDURE — 96401 CHEMO ANTI-NEOPL SQ/IM: CPT

## 2021-07-15 RX ORDER — LISINOPRIL 20 MG/1
20 TABLET ORAL DAILY
Qty: 30 TABLET | Refills: 0 | Status: SHIPPED | OUTPATIENT
Start: 2021-07-15 | End: 2021-07-29

## 2021-07-15 RX ORDER — CHLORTHALIDONE 25 MG/1
25 TABLET ORAL DAILY
Qty: 30 TABLET | Refills: 0 | Status: SHIPPED | OUTPATIENT
Start: 2021-07-15 | End: 2021-07-29

## 2021-07-15 RX ORDER — BICALUTAMIDE 50 MG/1
50 TABLET, FILM COATED ORAL DAILY
Qty: 14 TABLET | Refills: 0 | Status: SHIPPED | OUTPATIENT
Start: 2021-07-15 | End: 2021-09-13

## 2021-07-15 ASSESSMENT — MIFFLIN-ST. JEOR
SCORE: 1863.96
SCORE: 1863.96

## 2021-07-15 ASSESSMENT — PAIN SCALES - GENERAL: PAINLEVEL: NO PAIN (0)

## 2021-07-15 NOTE — NURSING NOTE
Dr. Norton advised of patients elevated blood pressure readings this morning.  Patient is having pressure behind his eyes and per Dr. Norton because patient is hypertensive and is symptomatic he should go to the ER after his visit today.  Patient advised per Dr. Norton that he is hypertensive and with the pain behind his eyes he needs to go to the ER.   Patient stated understanding but wanted to speak with his PCP today.  Patient then called his PCP from our office and was advised to see Dr. Gutiérrez at 3 pm today at [facility], if he has any symptoms prior to the appointment he needs to go to the ER, patient stated understanding.    Brianna Hyde CMA

## 2021-07-15 NOTE — PATIENT INSTRUCTIONS
Boaz    It was a pleasure seeing you in clinic today.  Here's the plan:    Check pressure 2x per day after sitting for 1-2 minutes with legs uncrossed and write in log.    Please don't forget to bring your log back to clinic for your follow-up visit.    Please bring your cuff to your follow-up appointment to check for accuracy.    Sodium intake should be less than 2000 mg per day.  Remember the majority of our sodium comes from preservatives rather than salt added to home meals.  Regular cardiovascular exercise will effectively lower your blood pressure.  A brisk 30-40min walk 3x per week is adequate exercise.  Current blood pressure medications:  Start chlorthalidone 25mg once daily alone for 3 days, then start the lisinopril 20mg daily.    Follow-up with me in 2 weeks for re-assessment.      Let me know if you have questions.    Gianluca Singh MD

## 2021-07-15 NOTE — PROGRESS NOTES
[facility] CANCER CLINIC  FOLLOW-UP VISIT NOTE    PATIENT NAME: Boaz Acosta MRN # [id number]  DATE OF VISIT: Jul 15, 2021 YOB: 1950    REFERRING PROVIDER: Rocío Boss MD  [address] [address]    CANCER TYPE: Prostate adenocarcinoma  STAGE: IV  ECOG PS: 0    TREATMENT SUMMARY:  Boaz's annual screening PSA was noted to be elevated at 17 in 2012, and 21 in the following year and it was attributed to prostatitis. Eventually, he did change his primary care physician and his repeat PSA was noted to be elevated at 31 in 04/2014. He was then referred to Urology, and was seen by Dr. Pressley with Urology in 07/2014. He was worked up with a biopsy of the prostate on 08/01//2014, which revealed adenocarcinoma of the prostate with Willis score of 4+3 involving 12 of the 12 cores. He was staged with a PET/CT scan, which did not show any evidence of distant metastasis. There was evidence of increased FDG uptake throughout the prostate with a maximum SUV of 7.1. He had a bone scan done on 08/19/2014, which did not show any obvious evidence of metastasis. Mr. Acosta underwent a robotic-assisted radical prostatectomy and lymph node sampling performed by Dr. Hesham Prieto on 10/24/2014. The pathology from this revealed adenocarcinoma of the prostate with a [place] of 7 and a tertiary pattern of 5. There was extensive prostatic tissue involvement with over 75% of the tissue with evidence of disease. There was extensive extraprostatic extension involving the apex, right and left anterior and posterior base, and soft tissue around both seminal vesicles. There was bilateral seminal vesicle invasion with involvement of vas deferens. Margins were involved with bladder neck and bilateral posterior seminal vesicle soft tissue apex. There was evidence of perineural invasion. The lymph node sample was negative for disease. His postoperative PSA  in 12/2014 remained elevated at 7.1, and all the subsequent PSAs have remained positive. His restaging scans done in January and again in 03/2015, including a CT abdomen and pelvis and bone scans, have been negative. He was referred to Radiation Oncology for consideration of salvage radiation therapy, and was seen by Dr. Murphy on 03/24/2015. Dr. Murphy referred him to Dr. Zbigniew Chu at the [facility] to have a choline-11 PET/CT scan done. The choline-11 scan done at [place] demonstrated evidence of 2 retroperitoneal lymph nodes that are mildly positive for uptake. There was no abnormal uptake in the prostate bed to suggest local recurrence. He was started on bicalutamide, and received his first dose of Lupron on 05/12/2015.   He comes for a scheduled follow up visit    Oncology Supportive Medications 5/12/2015 9/2/2015 1/4/2016   leuprolide (LUPRON DEPOT) IM 30 mg 30 mg 30 mg     CURRENT TREATMENT  Break period of intermittent androgen deprivation therapy    SUBJECTIVE   Boaz comes for a scheduled follow up visit on androgen ablation therapy.     Boaz was seen in person. He is quite stressed that his son is doing very poorly and is only 31 yrs old. He contracted lyme disease and he has been sick since then. He contracts mould infection. He has to live in a tent. His son has been sseen by every expert in the counttry and also had treatment in [place]. They are looking at a new trailer to stay in.     He had his identify stolen over computer.     He is aware of his PSA and testosterone values. He still has hot flashes.       He has been doing well and denies any other complains.        PAST MEDICAL HISTORY   1. Prostate cancer with disease metastatic to para-aortic nodes   2. Borderline HTN  3. ADALID an CPAP at night  4. GERD      CURRENT OUTPATIENT MEDICATIONS     Current Outpatient Medications   Medication Sig     ascorbic acid (VITAMIN C) 1000 MG TABS Take 1-2 tablets by mouth as needed      bicalutamide (CASODEX) 50 MG  "tablet Take 1 tablet (50 mg) by mouth daily     indomethacin (INDOCIN) 50 MG capsule Take 1 capsule (50 mg) by mouth 2 times daily (with meals) (Patient taking differently: Take 50 mg by mouth as needed )     Leuprolide Acetate, 4 Month, (LUPRON DEPOT, 4-MONTH, IM)      Multiple Vitamin (DAILY MULTIVITAMIN PO) Take 1 tablet by mouth daily.     naproxen sodium (ANAPROX) 220 MG tablet Take 220 mg by mouth 2 times daily (with meals)     ORDER FOR DME, SET TO FAX, PATIENT WAS SET UP ON A [employer] 560 AUTO MACHINE AT [employer] 9CMH2O WITH HEATED HUMIDITY. PATIENT HAD A CHOICE OF MASK AND CHOSE THE AIRFIT P10 SIZE MEDIUM NASAL PILLOW. HE HAS A F/U APPOINTMENT TO HIS SLEEP STUDY 2/5 WITH TURNER GLASER PA-C AND A SECOND ONE SCHEDULED IN 6 WEEKS.     UNABLE TO FIND MEDICATION NAME: Turkey tail supplement     EPINEPHrine (EPIPEN 2-BRIAN) 0.3 MG/0.3ML injection Inject 0.3 mLs (0.3 mg) into the muscle once as needed (Patient not taking: Reported on 7/15/2021)     No current facility-administered medications for this visit.         ALLERGIES   No Known Allergies     REVIEW OF SYSTEMS   As above in the HPI, o/w complete 12-point ROS was negative.     PHYSICAL EXAM   BP (!) 202/98 (BP Location: Right arm, Patient Position: Sitting, Cuff Size: Adult Large)   Pulse 61   Temp 98.9  F (37.2  C) (Oral)   Resp 16   Ht 1.727 m (5' 8\")   Wt 112.9 kg (249 lb)   SpO2 98%   BMI 37.86 kg/m      SpO2 Readings from Last 4 Encounters:   06/18/18 96%   10/30/17 97%   10/16/17 97%   06/19/17 96%     Wt Readings from Last 3 Encounters:   07/15/21 112.9 kg (249 lb)   04/21/21 113.4 kg (250 lb)   01/21/21 116.8 kg (257 lb 9.6 oz)     GEN: NAD  HEENT: PERRL, EOMI, no icterus, injection or pallor. Oropharynx is clear.  NECK: no cervical or supraclavicular lymphadenopathy  LUNGS: clear bilaterally  CV: regular, no murmurs, rubs, or gallops  ABDOMEN: soft, non-tender, non-distended, normal bowel sounds, no hepatosplenomegaly by percussion or " palpation  EXT: warm, well perfused, no edema  NEURO: alert  SKIN: lesion in subxiphoid (epigastrium) as below     LABORATORY AND IMAGING STUDIES     Recent Labs   Lab Test 07/12/21  0841 01/18/21  0757 07/30/20  0733 04/27/20  0803 10/28/19  0853    141 142 140 141   POTASSIUM 4.1 4.1 4.3 4.4 3.9   CHLORIDE 104 107 110* 107 107   CO2 30 28 28 29 30   ANIONGAP 3 6 4 4 4   BUN 12 15 12 14 19   CR 1.01 0.89 0.94 0.93 0.86   * 128* 114* 118* 128*   KERRY 9.6 9.1 9.2 9.6 8.9     No results for input(s): MAG, PHOS in the last 49998 hours.  Recent Labs   Lab Test 07/12/21  0841 01/18/21  0757 07/30/20  0733 04/27/20  0803 10/28/19  0853   WBC 6.0 6.4 5.4 6.1 6.4   HGB 14.3 13.6 14.1 14.0 12.8*    207 222 238 223   MCV 96 95 94 95 97   NEUTROPHIL 51 42.5 39.6 52.0 43.1     Recent Labs   Lab Test 07/12/21  0841 01/18/21  0757 07/30/20  0733 06/11/18  0742 02/12/18  0748   BILITOTAL 0.9 0.4 0.6 0.3 0.5   ALKPHOS 54 50 48 47 44   ALT 89* 96* 89* 42 49   AST 62* 55* 68* 33 38   ALBUMIN 3.5 3.7 3.8 3.4 3.7     --   --  204 197     TSH   Date Value Ref Range Status   03/14/2005 2.22 0.4 - 5.0 mU/L Final     No results for input(s): CEA in the last 53545 hours.  Results for orders placed or performed in visit on 06/18/15   US Scrotum and Testicles    Narrative    Examination: Testicular ultrasound 6/18/2015 6:16 PM     Comparison: None.    History: Metastatic prostate cancer and left testicular mass.     Findings: There is homogeneous normal echotexture throughout the  bilateral testes. The left testicle measures 4.9 x 3.2 x 1.9 cm. The  left epididymal head measures 1.2 x 1.0 x 1.0 cm. The right testicle  measures 4.9 x 3.2 x 1.7 cm. The right epididymal head measures 1.3 x  0.7 x 1.1 cm. Doppler evaluation shows normal arterial waveforms to  the testes bilaterally. There is no hydrocele or varicocele. There is  no mass or abnormal calcifications within the testicles. Prominent  rete testes in the left  testicle.     In the region of the left epididymal tail where the patient indicates  the palpable lump, there is a round nodule measuring 0.5 x 0.6 x 0.6  cm with a hyperechoic rim. The nodule is not hypervascular.      Impression    Impression: 0.6 cm round nodule with hyperechoic rim in the region of  the left epididymal tail. This may represent a thrombosed varicocele.  The nodule is extratesticular and therefore malignancy is highly  unlikely.    I have personally reviewed the examination and initial interpretation  and I agree with the findings.    MIKE QUACH MD     Recent Labs   Lab Test 07/12/21  0841 01/18/21  0757 07/30/20  0733 04/27/20  0803 10/28/19  0853 02/18/19  0759   PSA 6.79* 0.38 8.23* 3.30 0.13 6.42*   TESTOSTTOTAL 595 671 582 754  --  625     Lab Test 07/12/21  0841 01/18/21  0757 07/30/20  0733 04/27/20  0803 10/28/19  0853 06/11/18  0742 02/12/18  0748 10/09/17  0750   PSA 6.79* 0.38 8.23* 3.30 0.13 5.56* 3.43 1.70   ALKPHOS 54 50 48 41 63 47 44 51     --   --   --   --  204 197 204          ASSESSMENT AND PLAN   1. Prostate cancer with metastasis to retroperitoneal nodes with persistent PSA elevation post prostatectomy; ish 4+3 disease, tertiary score of 5  2. ECOG PS 0  3. No medical comorbiditiies    He is doing well and has no major physical complains. He is quite worried about his son who is struggling with addiction and he had his identity stolen over [employer].     I have reviewed all of the labs done prior to this clinic visit.  Labs are all completely normal including electrolytes, renal function, complete blood count and differential.  His hepatic panel does reveal persistent elevation in his transaminases which has been stable.     His testosterone is normal at 595.  His PSA did rise up to 6.79 ng/ml. He is happy with the progress. The last time his PSA had gone up to 8.23 ng/ml at this time. I do not feel that there is a difference between the two numbers.  He has been on intermittent androgen deprivation therapy. I will restart him on androgen deprivation therapy and he is in agreement. He will get another dose of leuprolide today.       I will again see him in 6 months with labs a week prior to visit including CBC, CMP, PSA, testosterone.    25 minutes spent on the date of the encounter doing chart review, history and exam, documentation and further activities as noted above     Pavel Norton  ,  Division of Hematology, Oncology & Transplantation  [facility].

## 2021-07-15 NOTE — TELEPHONE ENCOUNTER
Call transferred to writer. Patient is currently at an appointment at his oncologist office and blood pressure is 202/98 and 200/92. Patient has history of high blood pressure but is not on medications for it. Patient also reports eye pressure and states he has had this in the past when his blood pressure has been high. Denies other symptoms.  who transferred patient to writer did state that next available appointment with primary care provider is not until 7/19. Per protocol patient is to be seen in clinic today. Transferred to scheduling to check other providers in primary care provider clinic. Primary care provider does have an appointment today at 3:00. Scheduled. Advised patient if he develops any new cardiac or neurologic symptoms he should be seen in ER prior to appointment. Patient agrees.     Reason for Disposition    Systolic BP >= 180 OR Diastolic >= 110    Additional Information    Negative: Sounds like a life-threatening emergency to the triager    Negative: Pregnant > 20 weeks or postpartum (< 6 weeks after delivery) and new hand or face swelling    Negative: Pregnant > 20 weeks and BP > 140/90    Negative: Systolic BP >= 160 OR Diastolic >= 100, and any cardiac or neurologic symptoms (e.g., chest pain, difficulty breathing, unsteady gait, blurred vision)    Negative: Patient sounds very sick or weak to the triager    Negative: BP Systolic BP >= 140 OR Diastolic >= 90 and postpartum (from 0 to 6 weeks after delivery)    Negative: Systolic BP >= 180 OR Diastolic >= 110, and missed most recent dose of blood pressure medication    Protocols used: HIGH BLOOD PRESSURE-A-OH

## 2021-07-15 NOTE — NURSING NOTE
"Oncology Rooming Note    July 15, 2021 8:51 AM   Boaz Acosta is a 71 year old male who presents for:    Chief Complaint   Patient presents with     Oncology Clinic Visit     6 month follow up     Initial Vitals: BP (!) 202/98 (BP Location: Right arm, Patient Position: Sitting, Cuff Size: Adult Large)   Pulse 61   Temp 98.9  F (37.2  C) (Oral)   Resp 16   Ht 1.727 m (5' 8\")   Wt 112.9 kg (249 lb)   SpO2 98%   BMI 37.86 kg/m   Estimated body mass index is 37.86 kg/m  as calculated from the following:    Height as of this encounter: 1.727 m (5' 8\").    Weight as of this encounter: 112.9 kg (249 lb). Body surface area is 2.33 meters squared.  No Pain (0) Comment: Data Unavailable   No LMP for male patient.  Allergies reviewed: Yes  Medications reviewed: Yes    Medications: Medication refills not needed today.  Pharmacy name entered into [facility]:    [id number] [place], [place] - [address] [address] [place], [place] - [address] [address]    Clinical concerns: Test results, High blood pressure/patient has been under a lot of stress the last few months. Feeling pressure behind his eyes, Lupron injection  Dr. Norton was notified.      Brianna Hyde CMA            "

## 2021-07-15 NOTE — LETTER
7/15/2021         RE: Boaz Acosta  [address] [address]        Dear Colleague,    Thank you for referring your patient, Boaz Acosta, to the [facility]. Please see a copy of my visit note below.    [facility] CANCER CLINIC  FOLLOW-UP VISIT NOTE    PATIENT NAME: Boaz Acosta MRN # [id number]  DATE OF VISIT: Jul 15, 2021 YOB: 1950    REFERRING PROVIDER: Rocío Boss MD  [facility]  [address] [place] [address]    CANCER TYPE: Prostate adenocarcinoma  STAGE: IV  ECOG PS: 0    TREATMENT SUMMARY:  Boaz's annual screening PSA was noted to be elevated at 17 in 2012, and 21 in the following year and it was attributed to prostatitis. Eventually, he did change his primary care physician and his repeat PSA was noted to be elevated at 31 in 04/2014. He was then referred to Urology, and was seen by Dr. Pressley with Urology in 07/2014. He was worked up with a biopsy of the prostate on 08/01//2014, which revealed adenocarcinoma of the prostate with [place] score of 4+3 involving 12 of the 12 cores. He was staged with a PET/CT scan, which did not show any evidence of distant metastasis. There was evidence of increased FDG uptake throughout the prostate with a maximum SUV of 7.1. He had a bone scan done on 08/19/2014, which did not show any obvious evidence of metastasis. Mr. Acosta underwent a robotic-assisted radical prostatectomy and lymph node sampling performed by Dr. Hesham Prieto on 10/24/2014. The pathology from this revealed adenocarcinoma of the prostate with a [place] of 7 and a tertiary pattern of 5. There was extensive prostatic tissue involvement with over 75% of the tissue with evidence of disease. There was extensive extraprostatic extension involving the apex, right and left anterior and posterior base, and soft tissue around both seminal vesicles. There was bilateral seminal  vesicle invasion with involvement of vas deferens. Margins were involved with bladder neck and bilateral posterior seminal vesicle soft tissue apex. There was evidence of perineural invasion. The lymph node sample was negative for disease. His postoperative PSA in 12/2014 remained elevated at 7.1, and all the subsequent PSAs have remained positive. His restaging scans done in January and again in 03/2015, including a CT abdomen and pelvis and bone scans, have been negative. He was referred to Radiation Oncology for consideration of salvage radiation therapy, and was seen by Dr. Murphy on 03/24/2015. Dr. Murphy referred him to Dr. Zbigniew Chu at the [facility] to have a choline-11 PET/CT scan done. The choline-11 scan done at [place] demonstrated evidence of 2 retroperitoneal lymph nodes that are mildly positive for uptake. There was no abnormal uptake in the prostate bed to suggest local recurrence. He was started on bicalutamide, and received his first dose of Lupron on 05/12/2015.   He comes for a scheduled follow up visit    Oncology Supportive Medications 5/12/2015 9/2/2015 1/4/2016   leuprolide (LUPRON DEPOT) IM 30 mg 30 mg 30 mg     CURRENT TREATMENT  Break period of intermittent androgen deprivation therapy    SUBJECTIVE   Boaz comes for a scheduled follow up visit on androgen ablation therapy.     Boaz was seen in person. He is quite stressed that his son is doing very poorly and is only 31 yrs old. He contracted lyme disease and he has been sick since then. He contracts mould infection. He has to live in a tent. His son has been sseen by every expert in the counttry and also had treatment in [place]. They are looking at a new trailer to stay in.     He had his identify stolen over computer.     He is aware of his PSA and testosterone values. He still has hot flashes.       He has been doing well and denies any other complains.        PAST MEDICAL HISTORY   1. Prostate cancer with disease metastatic to para-aortic  "nodes   2. Borderline HTN  3. ADALID an CPAP at night  4. GERD      CURRENT OUTPATIENT MEDICATIONS     Current Outpatient Medications   Medication Sig     ascorbic acid (VITAMIN C) 1000 MG TABS Take 1-2 tablets by mouth as needed      bicalutamide (CASODEX) 50 MG tablet Take 1 tablet (50 mg) by mouth daily     indomethacin (INDOCIN) 50 MG capsule Take 1 capsule (50 mg) by mouth 2 times daily (with meals) (Patient taking differently: Take 50 mg by mouth as needed )     Leuprolide Acetate, 4 Month, (LUPRON DEPOT, 4-MONTH, IM)      Multiple Vitamin (DAILY MULTIVITAMIN PO) Take 1 tablet by mouth daily.     naproxen sodium (ANAPROX) 220 MG tablet Take 220 mg by mouth 2 times daily (with meals)     ORDER FOR DME, SET TO FAX, PATIENT WAS SET UP ON A [employer] MACHINE AT [employer] 9CMH2O WITH HEATED HUMIDITY. PATIENT HAD A CHOICE OF MASK AND CHOSE THE AIRFIT P10 SIZE MEDIUM NASAL PILLOW. HE HAS A F/U APPOINTMENT TO HIS SLEEP STUDY 2/5 WITH TURNER GLASER PA-C AND A SECOND ONE SCHEDULED IN 6 WEEKS.     UNABLE TO FIND MEDICATION NAME: Turkey tail supplement     EPINEPHrine (EPIPEN 2-BRIAN) 0.3 MG/0.3ML injection Inject 0.3 mLs (0.3 mg) into the muscle once as needed (Patient not taking: Reported on 7/15/2021)     No current facility-administered medications for this visit.         ALLERGIES   No Known Allergies     REVIEW OF SYSTEMS   As above in the HPI, o/w complete 12-point ROS was negative.     PHYSICAL EXAM   BP (!) 202/98 (BP Location: Right arm, Patient Position: Sitting, Cuff Size: Adult Large)   Pulse 61   Temp 98.9  F (37.2  C) (Oral)   Resp 16   Ht 1.727 m (5' 8\")   Wt 112.9 kg (249 lb)   SpO2 98%   BMI 37.86 kg/m      SpO2 Readings from Last 4 Encounters:   06/18/18 96%   10/30/17 97%   10/16/17 97%   06/19/17 96%     Wt Readings from Last 3 Encounters:   07/15/21 112.9 kg (249 lb)   04/21/21 113.4 kg (250 lb)   01/21/21 116.8 kg (257 lb 9.6 oz)     GEN: NAD  HEENT: PERRL, EOMI, no icterus, injection or " pallor. Oropharynx is clear.  NECK: no cervical or supraclavicular lymphadenopathy  LUNGS: clear bilaterally  CV: regular, no murmurs, rubs, or gallops  ABDOMEN: soft, non-tender, non-distended, normal bowel sounds, no hepatosplenomegaly by percussion or palpation  EXT: warm, well perfused, no edema  NEURO: alert  SKIN: lesion in subxiphoid (epigastrium) as below     LABORATORY AND IMAGING STUDIES     Recent Labs   Lab Test 07/12/21  0841 01/18/21  0757 07/30/20  0733 04/27/20  0803 10/28/19  0853    141 142 140 141   POTASSIUM 4.1 4.1 4.3 4.4 3.9   CHLORIDE 104 107 110* 107 107   CO2 30 28 28 29 30   ANIONGAP 3 6 4 4 4   BUN 12 15 12 14 19   CR 1.01 0.89 0.94 0.93 0.86   * 128* 114* 118* 128*   KERRY 9.6 9.1 9.2 9.6 8.9     No results for input(s): MAG, PHOS in the last 69808 hours.  Recent Labs   Lab Test 07/12/21  0841 01/18/21  0757 07/30/20  0733 04/27/20  0803 10/28/19  0853   WBC 6.0 6.4 5.4 6.1 6.4   HGB 14.3 13.6 14.1 14.0 12.8*    207 222 238 223   MCV 96 95 94 95 97   NEUTROPHIL 51 42.5 39.6 52.0 43.1     Recent Labs   Lab Test 07/12/21  0841 01/18/21  0757 07/30/20  0733 06/11/18  0742 02/12/18  0748   BILITOTAL 0.9 0.4 0.6 0.3 0.5   ALKPHOS 54 50 48 47 44   ALT 89* 96* 89* 42 49   AST 62* 55* 68* 33 38   ALBUMIN 3.5 3.7 3.8 3.4 3.7     --   --  204 197     TSH   Date Value Ref Range Status   03/14/2005 2.22 0.4 - 5.0 mU/L Final     No results for input(s): CEA in the last 60844 hours.  Results for orders placed or performed in visit on 06/18/15   US Scrotum and Testicles    Narrative    Examination: Testicular ultrasound 6/18/2015 6:16 PM     Comparison: None.    History: Metastatic prostate cancer and left testicular mass.     Findings: There is homogeneous normal echotexture throughout the  bilateral testes. The left testicle measures 4.9 x 3.2 x 1.9 cm. The  left epididymal head measures 1.2 x 1.0 x 1.0 cm. The right testicle  measures 4.9 x 3.2 x 1.7 cm. The right epididymal  head measures 1.3 x  0.7 x 1.1 cm. Doppler evaluation shows normal arterial waveforms to  the testes bilaterally. There is no hydrocele or varicocele. There is  no mass or abnormal calcifications within the testicles. Prominent  rete testes in the left testicle.     In the region of the left epididymal tail where the patient indicates  the palpable lump, there is a round nodule measuring 0.5 x 0.6 x 0.6  cm with a hyperechoic rim. The nodule is not hypervascular.      Impression    Impression: 0.6 cm round nodule with hyperechoic rim in the region of  the left epididymal tail. This may represent a thrombosed varicocele.  The nodule is extratesticular and therefore malignancy is highly  unlikely.    I have personally reviewed the examination and initial interpretation  and I agree with the findings.    MIKE QUACH MD     Recent Labs   Lab Test 07/12/21  0841 01/18/21  0757 07/30/20  0733 04/27/20  0803 10/28/19  0853 02/18/19  0759   PSA 6.79* 0.38 8.23* 3.30 0.13 6.42*   TESTOSTTOTAL 595 671 582 754  --  625     Lab Test 07/12/21  0841 01/18/21  0757 07/30/20  0733 04/27/20  0803 10/28/19  0853 06/11/18  0742 02/12/18  0748 10/09/17  0750   PSA 6.79* 0.38 8.23* 3.30 0.13 5.56* 3.43 1.70   ALKPHOS 54 50 48 41 63 47 44 51     --   --   --   --  204 197 204          ASSESSMENT AND PLAN   1. Prostate cancer with metastasis to retroperitoneal nodes with persistent PSA elevation post prostatectomy; ish 4+3 disease, tertiary score of 5  2. ECOG PS 0  3. No medical comorbiditiies    He is doing well and has no major physical complains. He is quite worried about his son who is struggling with addiction and he had his identity stolen over [employer].     I have reviewed all of the labs done prior to this clinic visit.  Labs are all completely normal including electrolytes, renal function, complete blood count and differential.  His hepatic panel does reveal persistent elevation in his transaminases which  has been stable.     His testosterone is normal at 595.  His PSA did rise up to 6.79 ng/ml. He is happy with the progress. The last time his PSA had gone up to 8.23 ng/ml at this time. I do not feel that there is a difference between the two numbers. He has been on intermittent androgen deprivation therapy. I will restart him on androgen deprivation therapy and he is in agreement. He will get another dose of leuprolide today.       I will again see him in 6 months with labs a week prior to visit including CBC, CMP, PSA, testosterone.    25 minutes spent on the date of the encounter doing chart review, history and exam, documentation and further activities as noted above     Pavel Norton  ,  Division of Hematology, Oncology & Transplantation  [facility].         Again, thank you for allowing me to participate in the care of your patient.        Sincerely,        Pavel Norton MD

## 2021-07-15 NOTE — NURSING NOTE
Infusion Nursing Note:  Boaz AJ Janinalali presents today for   Chief Complaint   Patient presents with     Oncology Clinic Visit     6 month follow up   .    Patient seen by provider today: Yes: Dr. Norton   present during visit today: Not Applicable.    Note: N/A.    Intravenous Access:  No Intravenous access/labs at this visit.    Treatment Conditions:  Not Applicable.      Post Infusion Assessment:  Patient tolerated injection without incident.  Site patent and intact, free from redness, edema or discomfort.       Discharge Plan:   Patient discharged in stable condition accompanied by: self.  Departure Mode: Ambulatory.      Brianna Hyde, CMA

## 2021-07-15 NOTE — PROGRESS NOTES
Assessment & Plan       ICD-10-CM    1. Hypertensive urgency  I16.0 chlorthalidone (HYGROTON) 25 MG tablet     lisinopril (ZESTRIL) 20 MG tablet   2. Need for hepatitis C screening test  Z11.59    3. Screen for colon cancer  Z12.11    4. Special screening for malignant neoplasms, colon  Z12.11 REVIEW OF HEALTH MAINTENANCE PROTOCOL ORDERS     Adult Gastro Ref - Procedure Only   5. Hyperglycemia  R73.9    6. Morbid obesity (H)  E66.01          Review of external notes as documented elsewhere in note         Patient Instructions   Boaz    It was a pleasure seeing you in clinic today.  Here's the plan:    Check pressure 2x per day after sitting for 1-2 minutes with legs uncrossed and write in log.    Please don't forget to bring your log back to clinic for your follow-up visit.    Please bring your cuff to your follow-up appointment to check for accuracy.    Sodium intake should be less than 2000 mg per day.  Remember the majority of our sodium comes from preservatives rather than salt added to home meals.  Regular cardiovascular exercise will effectively lower your blood pressure.  A brisk 30-40min walk 3x per week is adequate exercise.  Current blood pressure medications:  Start chlorthalidone 25mg once daily alone for 3 days, then start the lisinopril 20mg daily.    Follow-up with me in 2 weeks for re-assessment.      Let me know if you have questions.    Gianluca Singh MD        Return in about 2 weeks (around 7/29/2021) for Hypertension.    Gianluca Singh MD  [facility] GUZMAN Sandra is a 71 year old who presents for the following health issues  accompanied by his self:    HPI     Patient presents with:  Hypertension: was seen @ [facility] this morning @ 8:30 AM and bp was high @ 202/98. currently not taking any bp meds. no symptoms     BP Readings from Last 6 Encounters:   07/15/21 (!) 200/80   07/15/21 (!) 202/98   01/21/21 (!) 171/82   07/30/20 (!) 162/82  "  06/07/20 (!) 179/106   10/31/19 (!) 195/92     Wt Readings from Last 4 Encounters:   07/15/21 112.9 kg (249 lb)   07/15/21 112.9 kg (249 lb)   04/21/21 113.4 kg (250 lb)   01/21/21 116.8 kg (257 lb 9.6 oz)     Hypertension  No bp medications  Does have headache in the morning  Not checking bp at home          Review of Systems   Constitutional, HEENT, cardiovascular, pulmonary, gi and gu systems are negative, except as otherwise noted.      Objective    BP (!) 200/80   Pulse 60   Temp 98.5  F (36.9  C) (Oral)   Ht 1.727 m (5' 8\")   Wt 112.9 kg (249 lb)   SpO2 96%   BMI 37.86 kg/m    Body mass index is 37.86 kg/m .  Physical Exam   GENERAL: healthy, alert and no distress  EYES: Eyes grossly normal to inspection, PERRL and conjunctivae and sclerae normal  NECK: no adenopathy, no asymmetry, masses, or scars and thyroid normal to palpation  RESP: lungs clear to auscultation - no rales, rhonchi or wheezes  CV: regular rate and rhythm, normal S1 S2, no S3 or S4, no murmur, click or rub, no peripheral edema and peripheral pulses strong  SKIN: no suspicious lesions or rashes  PSYCH: mentation appears normal, affect normal/bright            "

## 2021-07-22 ENCOUNTER — TELEPHONE (OUTPATIENT)
Dept: GASTROENTEROLOGY | Facility: CLINIC | Age: 71
End: 2021-07-22

## 2021-07-22 ENCOUNTER — MYC MEDICAL ADVICE (OUTPATIENT)
Dept: FAMILY MEDICINE | Facility: CLINIC | Age: 71
End: 2021-07-22

## 2021-07-22 DIAGNOSIS — Z11.59 ENCOUNTER FOR SCREENING FOR OTHER VIRAL DISEASES: ICD-10-CM

## 2021-07-22 NOTE — TELEPHONE ENCOUNTER
Screening Questions  1. Are you active on mychart? Yes    2. What insurance is in the chart? [employer] and Medicare on chart    2.  Ordering/Referring Provider: Gianluca Powell MD in [facility]      3. BMI 36.6    4. Are you on daily home oxygen? no    5. Do you have a history of difficult airway? no    6. Have you had a heart, lung, or liver transplant? no    7. Are you currently on dialysis? no    8. Have you had a stroke or Transient ischemic atttack (TIA) within 6 months? no    9. In the past 6 months, have you had any heart related issues including cardiomyopathy or heart attack?         If yes, did it require cardiac stenting or other implantable device?no    10. Do you have any implantable devices in your body (pacemaker, defib, LVAD)? no    11. Do you take nitroglycerin? If yes, how often? no    12. Are you currently taking any blood thinners?no    13. Are you a diabetic? no    14. (Females) Are you currently pregnant? n/a  If yes, how many weeks?    15. Have you had a procedure in the past that was difficult to tolerate with conscious sedation? Any allergies to Fentanyl or Versed no    16. Are you taking any scheduled prescription narcotics more than once daily? no    17. Do you have any chemical dependencies such as alcohol, street drugs, or methadone? no    18. Do you have any history of post-traumatic stress syndrome or mental health issues? no    19. Do you transfer independently? yes    20.  Do you have any issues with constipation? no    21. Preferred Pharmacy for Pre Prescription On chart    Scheduling Details    Colonoscopy Prep Sent?: Miralax prep  Procedure Scheduled: Colonoscopy  Provider/Surgeon: Alen  Date of Procedure: 8/6/21  Location: [facility]  Caller (Please ask for phone number if not scheduled by patient): Boaz      Sedation Type: MAC sedation due to order not specifying- Patient states that he will be declining any sedation.  Conscious Sedation- Needs  for 6  hours after the procedure  MAC/General-Needs  for 24 hours after procedure    Pre-op Required at [facility], [place], [place] and OR for MAC sedation:   (if yes advise patient they will need a pre-op prior to procedure)      Is patient on blood thinners? -no (If yes- inform patient to follow up with PCP or provider for follow up instructions)     Informed patient they will need an adult  yes  Cannot take any type of public or medical transportation alone    Informed Patient of COVID Test Requirement yes    Confirmed Nurse will call to complete assessment yes    Additional comments: n/a

## 2021-07-29 ENCOUNTER — OFFICE VISIT (OUTPATIENT)
Dept: FAMILY MEDICINE | Facility: CLINIC | Age: 71
End: 2021-07-29
Payer: COMMERCIAL

## 2021-07-29 ENCOUNTER — TELEPHONE (OUTPATIENT)
Dept: GASTROENTEROLOGY | Facility: OUTPATIENT CENTER | Age: 71
End: 2021-07-29

## 2021-07-29 VITALS
BODY MASS INDEX: 37.5 KG/M2 | TEMPERATURE: 98.5 F | SYSTOLIC BLOOD PRESSURE: 135 MMHG | HEART RATE: 66 BPM | WEIGHT: 246.6 LBS | OXYGEN SATURATION: 98 % | DIASTOLIC BLOOD PRESSURE: 85 MMHG

## 2021-07-29 DIAGNOSIS — I10 HYPERTENSION, UNSPECIFIED TYPE: ICD-10-CM

## 2021-07-29 PROCEDURE — 99213 OFFICE O/P EST LOW 20 MIN: CPT | Performed by: FAMILY MEDICINE

## 2021-07-29 RX ORDER — CHLORTHALIDONE 25 MG/1
25 TABLET ORAL DAILY
Qty: 90 TABLET | Refills: 0 | Status: SHIPPED | OUTPATIENT
Start: 2021-07-29 | End: 2021-11-08

## 2021-07-29 RX ORDER — LISINOPRIL 20 MG/1
20 TABLET ORAL DAILY
Qty: 90 TABLET | Refills: 0 | Status: SHIPPED | OUTPATIENT
Start: 2021-07-29 | End: 2021-11-08

## 2021-07-29 NOTE — PROGRESS NOTES
Assessment & Plan       ICD-10-CM    1. Hypertension, unspecified type  I10 chlorthalidone (HYGROTON) 25 MG tablet     lisinopril (ZESTRIL) 20 MG tablet     Med refill  Continue current therapy                 Return in about 6 months (around 1/29/2022) for Hypertension.    Gianluca Singh MD  [facility] GUZMAN Sandra is a 71 year old who presents for the following health issues     HPI     Hypertension Follow-up      Do you check your blood pressure regularly outside of the clinic? Yes     Are you following a low salt diet? Yes    Are your blood pressures ever more than 140 on the top number (systolic) OR more   than 90 on the bottom number (diastolic), for example 140/90? Yes  Home bp 118-145/70's-90's  Side effects - fatigue  bicalutamide for prostate cancer treatment therapy  noom 262lb down to 243 (weighed at home)    Wt Readings from Last 4 Encounters:   07/29/21 111.9 kg (246 lb 9.6 oz)   07/15/21 112.9 kg (249 lb)   07/15/21 112.9 kg (249 lb)   04/21/21 113.4 kg (250 lb)           Review of Systems   Constitutional, HEENT, cardiovascular, pulmonary, gi and gu systems are negative, except as otherwise noted.      Objective    /85   Pulse 66   Temp 98.5  F (36.9  C) (Oral)   Wt 111.9 kg (246 lb 9.6 oz)   SpO2 98%   BMI 37.50 kg/m    Body mass index is 37.5 kg/m .  Physical Exam   GENERAL: healthy, alert and no distress  EYES: Eyes grossly normal to inspection, PERRL and conjunctivae and sclerae normal  NECK: no adenopathy, no asymmetry, masses, or scars and thyroid normal to palpation  SKIN: no suspicious lesions or rashes  PSYCH: mentation appears normal, affect normal/bright

## 2021-07-29 NOTE — TELEPHONE ENCOUNTER
Is patient taking blood thinners/antiplatelet medications? No    Heart Disease? No    Lung Disease? No    Sleep Apnea? Yes, CPAP    Diabetic? No    Kidney Disease? No    Electronic Implantable Devices? No    Indication for Procedure: screening colonoscopy    Referring Provider: Gianluca Powell MD    Arrival Time: 0900    COVID test? 8.3.2021    Writer reviewed pre-assessment questions with patient prior to upcoming colonoscopy on 8.6.2021.      Reviewed miralax and magnesium citrate prep instructions with patient.  Noting no nuts, seeds, or popcorn 7 days prior to procedure.     Designated  policy reviewed with patient.  Pt indicated he would like procedure without sedation.  Message sent to endo scheduling to confirm if this is an option at [facility].    Patient verbalized understanding.  No further questions or concerns.    Angle Rothman RN

## 2021-08-03 ENCOUNTER — LAB (OUTPATIENT)
Dept: LAB | Facility: CLINIC | Age: 71
End: 2021-08-03
Payer: COMMERCIAL

## 2021-08-03 DIAGNOSIS — Z11.59 ENCOUNTER FOR SCREENING FOR OTHER VIRAL DISEASES: ICD-10-CM

## 2021-08-03 PROCEDURE — U0003 INFECTIOUS AGENT DETECTION BY NUCLEIC ACID (DNA OR RNA); SEVERE ACUTE RESPIRATORY SYNDROME CORONAVIRUS 2 (SARS-COV-2) (CORONAVIRUS DISEASE [COVID-19]), AMPLIFIED PROBE TECHNIQUE, MAKING USE OF HIGH THROUGHPUT TECHNOLOGIES AS DESCRIBED BY CMS-2020-01-R: HCPCS

## 2021-08-03 PROCEDURE — U0005 INFEC AGEN DETEC AMPLI PROBE: HCPCS

## 2021-08-04 LAB — SARS-COV-2 RNA RESP QL NAA+PROBE: NEGATIVE

## 2021-08-06 ENCOUNTER — LAB REQUISITION (OUTPATIENT)
Dept: LAB | Facility: CLINIC | Age: 71
End: 2021-08-06
Payer: COMMERCIAL

## 2021-08-06 ENCOUNTER — DOCUMENTATION ONLY (OUTPATIENT)
Dept: GASTROENTEROLOGY | Facility: OUTPATIENT CENTER | Age: 71
End: 2021-08-06
Payer: COMMERCIAL

## 2021-08-06 ENCOUNTER — TRANSFERRED RECORDS (OUTPATIENT)
Dept: HEALTH INFORMATION MANAGEMENT | Facility: CLINIC | Age: 71
End: 2021-08-06

## 2021-08-06 DIAGNOSIS — Z12.11 SPECIAL SCREENING FOR MALIGNANT NEOPLASMS, COLON: ICD-10-CM

## 2021-08-06 PROCEDURE — 88305 TISSUE EXAM BY PATHOLOGIST: CPT | Mod: TC,ORL | Performed by: INTERNAL MEDICINE

## 2021-08-06 PROCEDURE — 88305 TISSUE EXAM BY PATHOLOGIST: CPT | Mod: 26 | Performed by: PATHOLOGY

## 2021-08-09 LAB
PATH REPORT.COMMENTS IMP SPEC: NORMAL
PATH REPORT.COMMENTS IMP SPEC: NORMAL
PATH REPORT.FINAL DX SPEC: NORMAL
PATH REPORT.GROSS SPEC: NORMAL
PATH REPORT.MICROSCOPIC SPEC OTHER STN: NORMAL
PATH REPORT.RELEVANT HX SPEC: NORMAL
PHOTO IMAGE: NORMAL

## 2021-08-30 DIAGNOSIS — H53.9 VISION CHANGES: Primary | ICD-10-CM

## 2021-09-04 ENCOUNTER — HEALTH MAINTENANCE LETTER (OUTPATIENT)
Age: 71
End: 2021-09-04

## 2021-09-05 ENCOUNTER — MYC MEDICAL ADVICE (OUTPATIENT)
Dept: FAMILY MEDICINE | Facility: CLINIC | Age: 71
End: 2021-09-05

## 2021-09-07 NOTE — TELEPHONE ENCOUNTER
Patient shouldn't be waiting for a call from ophtho.  He should already have called them to make an appointment.    Gianluca Singh MD

## 2021-09-08 ENCOUNTER — TRANSFERRED RECORDS (OUTPATIENT)
Dept: HEALTH INFORMATION MANAGEMENT | Facility: CLINIC | Age: 71
End: 2021-09-08

## 2021-09-10 NOTE — TELEPHONE ENCOUNTER
Please figure out what he's referring to.  Medical management of what?  Leave it to me to proceed with what?  If he's referring to management of his eye issue, his eye doc would manage that diagnosis.    Gianluca Singh MD

## 2021-09-10 NOTE — TELEPHONE ENCOUNTER
"Patient was seen at [facility] to evaluate vision issues. Eye doctor advised to follow up with PCP as he thought this was  \"new onset migraine.\" He advised discussing medications with PCP because these symptoms are common with patients on hormonal medications. Patient is on hormonal medication and blood pressure medication. He would like to know how to proceed. Does he need referal or to be seen by PCP to discuss?    Joanna Garza RN    "

## 2021-09-13 ENCOUNTER — VIRTUAL VISIT (OUTPATIENT)
Dept: FAMILY MEDICINE | Facility: CLINIC | Age: 71
End: 2021-09-13
Payer: COMMERCIAL

## 2021-09-13 DIAGNOSIS — I10 HYPERTENSION GOAL BP (BLOOD PRESSURE) < 140/90: Chronic | ICD-10-CM

## 2021-09-13 DIAGNOSIS — H53.9 VISION CHANGES: ICD-10-CM

## 2021-09-13 DIAGNOSIS — G43.009 ATYPICAL MIGRAINE: Primary | ICD-10-CM

## 2021-09-13 PROCEDURE — 99214 OFFICE O/P EST MOD 30 MIN: CPT | Mod: 95 | Performed by: FAMILY MEDICINE

## 2021-09-13 NOTE — PROGRESS NOTES
Boaz is a 71 year old who is being evaluated via a billable telephone visit.      What phone number would you like to be contacted at? [phone]  How would you like to obtain your AVS? MyChart    Assessment & Plan       ICD-10-CM    1. Atypical migraine  G43.009 Adult Neurology Referral   2. Vision changes  H53.9 Adult Neurology Referral   3. Hypertension goal BP (blood pressure) < 140/90  I10      Ok to take bp medications every other day if bp stays at goal, patient will check bp frequently, assess migraine frequency and severity.  Patient doesn't want to take additional medications for migraine at this time, I am agreeable to try this    Review of external notes as documented elsewhere in note         Patient Instructions     Boaz    It was a pleasure seeing you in clinic today.  Here's the plan:    1. Migraine - trial of bp mediations every other day, check bp frequently to avoid acute elevation, monitor migraine frequency and severity, referral to neurology, contact info below.    [facility] [place] [facility]   [address] [address]   Phone: [phone]   Fax: [phone]       Let me know if you have questions.    Gianluca Singh MD        Return in about 2 weeks (around 2021) for With Specialist.    Gianluca Singh MD  [facility]   Boaz is a 71 year old who presents for the following health issues     HPI     Chief Complaint   Patient presents with     Fup per Eye doctor recommendation     Not an ocular migraine per optho  Common migraine, non-intractable  Trigger - heat, noise  30mins  2 weeks ago episodes started getting more frequent and severe  Worst sensation in eyes   vision during episodes, difficulty identifying details x 10-15 mins  He was told it may be associated with dose changes of bp medications so he started taking his bp medications every other day, didn't see an increase in his bp however has not yet assessed  migraine         Review of Systems   Constitutional, HEENT, cardiovascular, pulmonary, gi and gu systems are negative, except as otherwise noted.      Objective           Vitals:  No vitals were obtained today due to virtual visit.    Physical Exam   healthy, alert and no distress  PSYCH: Alert and oriented times 3; coherent speech, normal   rate and volume, able to articulate logical thoughts, able   to abstract reason, no tangential thoughts, no hallucinations   or delusions  His affect is normal  RESP: No cough, no audible wheezing, able to talk in full sentences  Remainder of exam unable to be completed due to telephone visits                Phone call duration: 35 minutes

## 2021-09-14 NOTE — PATIENT INSTRUCTIONS
Boaz    It was a pleasure seeing you in clinic today.  Here's the plan:    1. Migraine - trial of bp mediations every other day, check bp frequently to avoid acute elevation, monitor migraine frequency and severity, referral to neurology, contact info below.    [facility]   [address] [place] [id number]   Phone: [phone]   Fax: [phone]       Let me know if you have questions.    Gianluca Singh MD

## 2021-09-17 NOTE — TELEPHONE ENCOUNTER
FUTURE VISIT INFORMATION      FUTURE VISIT INFORMATION:    Date: 9/30/2021    Time: 8am    Location: [facility] [place] [place]  REFERRAL INFORMATION:    Referring provider:  Dr. Powell     Referring providers clinic:  [facility]     Reason for visit/diagnosis  Migraines     RECORDS REQUESTED FROM:       Clinic name Comments Records Status Imaging Status   Internal Dr. Powell-9/13/2021 Epic N/A

## 2021-09-30 ENCOUNTER — PRE VISIT (OUTPATIENT)
Dept: NEUROLOGY | Facility: CLINIC | Age: 71
End: 2021-09-30

## 2021-09-30 ENCOUNTER — TELEPHONE (OUTPATIENT)
Dept: NEUROLOGY | Facility: CLINIC | Age: 71
End: 2021-09-30
Payer: COMMERCIAL

## 2021-09-30 NOTE — TELEPHONE ENCOUNTER
[facility] Call Center    Phone Message    May a detailed message be left on voicemail: yes     Reason for Call: Patient calling back.  Patient is ok to change appointment to 10/5/21 at noon, per provider suggestion.  Writer not able to rescheduled appointment as Enrique Arias is in chart.  Please change appointment    Action Taken: Message routed to:  [facility] Neurology    Travel Screening: Not Applicable

## 2021-09-30 NOTE — TELEPHONE ENCOUNTER
Called and LM to reschedule patient appt due to double booking. Reschedule for Oct 5th @ 12pm. OK'd per Roma.    Enrique Arias

## 2021-10-05 ENCOUNTER — VIRTUAL VISIT (OUTPATIENT)
Dept: NEUROLOGY | Facility: CLINIC | Age: 71
End: 2021-10-05
Payer: COMMERCIAL

## 2021-10-05 DIAGNOSIS — C61 PROSTATE CANCER (H): ICD-10-CM

## 2021-10-05 DIAGNOSIS — H53.9 VISION CHANGES: ICD-10-CM

## 2021-10-05 DIAGNOSIS — G43.009 ATYPICAL MIGRAINE: ICD-10-CM

## 2021-10-05 DIAGNOSIS — H53.419: ICD-10-CM

## 2021-10-05 DIAGNOSIS — G45.8 OTHER TRANSIENT CEREBRAL ISCHEMIC ATTACKS AND RELATED SYNDROMES: ICD-10-CM

## 2021-10-05 DIAGNOSIS — R29.818 TRANSIENT NEUROLOGICAL SYMPTOMS: Primary | ICD-10-CM

## 2021-10-05 PROCEDURE — 99204 OFFICE O/P NEW MOD 45 MIN: CPT | Mod: 95 | Performed by: NURSE PRACTITIONER

## 2021-10-05 NOTE — PATIENT INSTRUCTIONS
Plan:  CRP and ESR   Brain MRI, MRA head and neck, brain MRV   ECHO and heart monitor   Monitor blood pressure   Call 911 with any stroke like symptoms  Follow up after the work up for review or sooner if needed

## 2021-10-05 NOTE — PROGRESS NOTES
"Boaz is a 71 year old who is being evaluated via a billable video visit.      How would you like to obtain your AVS? MyChart  If the video visit is dropped, the invitation should be resent by: Text to cell phone: [phone]  Will anyone else be joining your video visit? No      Video-Visit Details    Type of service:  Video Visit    Video start 12:41 PM    ASSESSMENT AND PLAN:  Transient neurological symptoms -vision loss concerning for TIA in 71-year old male with risk factors and history of cancer. History of prostate cancer and lupron therapy.   Differentials -lupron side effects - possible pituitary apoplexia, TIA/stroke, vascular-aneurysm hemorrhage,tumor/mass, infection, CVT, GCA   Recommended TIA work up  Plan:  CRP and ESR   Brain MRI, MRA head and neck   ECHO and heart monitor continues for 7 days   Call 911 with any stroke like symptoms  Follow up after work up      Subjective   Boaz is a 71 year old who presents for the following health issues-new onset of transient neurological symptoms   History of prostate cancer, s/p prostatectomy, started Lupron in 2015   Has been seen oncology with last visit note on July 15th, 2021    HPI   Extremely rare to have a headache unless very sick. No significant headache history.  Had Lyme disease about 12 years ago and a horrible headaches than but none since than except Started having headaches when started Lupron-a low grade headache daily for a few years.   New symptoms/deficit-On August 28th 2021 was driving home and things became so bright that loss vision and very painful to open his eyes and both pupils pin holes and a very intense feeling -constriction around head. Lasted 15 minutes before resolved. Returned back home and checked BP and normal.  Patient did not go to the ED or call 911  \"Lupron headache\" holocephalic and headache recent was concentrated around eyes and very intense, no nausea or vomiting. No LOC. No chest pain or palpitations.   On Tuesday was " "at the Gym -came out of the steam room and was going to a pool to swim -similar eye symptoms-with an intense light sensitivity lasting 15 minutes  August 28th -Sep 10th -6 episodes  Ophthalmology evaluation -unremarkable per patient.   Oncologist Dr Norton note reviewed -concern for hypertension and was recommended to follow up with PCP   Patient does Lupron 4 months on and 4 months off and cycle for 7 years.   Lupron injection 45 mg (deviated from typical 30 mg dose) on July 15th  And prior to that increasing BP  Seen Dr Powell on July 15th and was started on antihypertensive     Review of Systems   Constitutional, HEENT, cardiovascular, pulmonary, gi and gu systems are negative, except as otherwise noted.    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 RESP: No audible wheeze, cough, or visible cyanosis.  No visible retractions or increased work of breathing.    SKIN: Visible skin clear.   NEURO: Face is symmetrical and symmetrical facial expressions, symmetrical smile, no apparent ptosis or weakness, patient is sitting in the chair and seems comfortable.  Mentation and speech appropriate for age.  PSYCH: Mentation appears normal, affect normal, judgement and insight intact, normal speech and appearance well-groomed.    Diagnostic Test Results -reviewed  Results for ORESTES MAO (MRN [id number]) as of 10/5/2021 12:54   Ref. Range 8/6/2019 12:26   Hemoglobin A1C Latest Ref Range: 0 - 5.6 % 5.6           I discussed all my recommendations with Orestes Mao who verbalizes understanding and comfortable with the plan.  All of " patient's questions were answered from the best of my knowledge.  Patient is in agreement with the plan.     SHAMEKA Irizarry, CNP [facility]  Headache certified  [facility]      Video-Visit Details    Type of service:  Video Visit    Video End Time:1:15 PM    Originating Location (pt. Location): Home    Distant Location (provider location):  [facility] [place]     Platform used for Video Visit: Riidr

## 2021-10-05 NOTE — LETTER
"10/5/2021       RE: Boaz Acosta  [address] [address]     Dear Colleague,    Thank you for referring your patient, Boaz Acosta, to the [facility] [place] [facility]. Please see a copy of my visit note below.    ASSESSMENT AND PLAN:  Transient neurological symptoms -vision loss concerning for TIA in 71-year old male with risk factors and history of cancer. History of prostate cancer and lupron therapy.   Differentials -lupron side effects - possible pituitary apoplexia, TIA/stroke, vascular-aneurysm hemorrhage,tumor/mass, infection, CVT, GCA   Recommended TIA work up  Plan:  CRP and ESR   Brain MRI, MRA head and neck   ECHO and heart monitor continues for 7 days   Call 911 with any stroke like symptoms  Follow up after work up      Subjective   Boaz is a 71 year old who presents for the following health issues-new onset of transient neurological symptoms   History of prostate cancer, s/p prostatectomy, started Lupron in 2015   Has been seen oncology with last visit note on July 15th, 2021    HPI   Extremely rare to have a headache unless very sick. No significant headache history.  Had Lyme disease about 12 years ago and a horrible headaches than but none since than except Started having headaches when started Lupron-a low grade headache daily for a few years.   New symptoms/deficit-On August 28th 2021 was driving home and things became so bright that loss vision and very painful to open his eyes and both pupils pin holes and a very intense feeling -constriction around head. Lasted 15 minutes before resolved. Returned back home and checked BP and normal.  Patient did not go to the ED or call 911  \"Lupron headache\" holocephalic and headache recent was concentrated around eyes and very intense, no nausea or vomiting. No LOC. No chest pain or palpitations.   On Tuesday was at the Gym -came out of the steam room and " "was going to a pool to swim -similar eye symptoms-with an intense light sensitivity lasting 15 minutes  August 28th -Sep 10th -6 episodes  Ophthalmology evaluation -unremarkable per patient.   Oncologist Dr Norton note reviewed -concern for hypertension and was recommended to follow up with PCP   Patient does Lupron 4 months on and 4 months off and cycle for 7 years.   Lupron injection 45 mg (deviated from typical 30 mg dose) on July 15th  And prior to that increasing BP  Seen Dr Powell on July 15th and was started on antihypertensive     Review of Systems   Constitutional, HEENT, cardiovascular, pulmonary, gi and gu systems are negative, except as otherwise noted.    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 RESP: No audible wheeze, cough, or visible cyanosis.  No visible retractions or increased work of breathing.    SKIN: Visible skin clear.   NEURO: Face is symmetrical and symmetrical facial expressions, symmetrical smile, no apparent ptosis or weakness, patient is sitting in the chair and seems comfortable.  Mentation and speech appropriate for age.  PSYCH: Mentation appears normal, affect normal, judgement and insight intact, normal speech and appearance well-groomed.    Diagnostic Test Results -reviewed  Results for ORESTES MAO (MRN [id number]) as of 10/5/2021 12:54   Ref. Range 8/6/2019 12:26   Hemoglobin A1C Latest Ref Range: 0 - 5.6 % 5.6           I discussed all my recommendations with Orestes Mao who verbalizes understanding and comfortable with the plan.  All of patient's questions were answered from the " best of my knowledge.  Patient is in agreement with the plan.     SHAMEKA Irizarry, CNP [facility]  Headache certified  [facility]      Again, thank you for allowing me to participate in the care of your patient.      Sincerely,    SHAMEKA Schaeffer CNP

## 2021-10-06 PROBLEM — G45.8 OTHER TRANSIENT CEREBRAL ISCHEMIC ATTACKS AND RELATED SYNDROMES: Status: ACTIVE | Noted: 2021-10-06

## 2021-10-06 PROBLEM — R29.818 TRANSIENT NEUROLOGICAL SYMPTOMS: Status: ACTIVE | Noted: 2021-10-06

## 2021-10-06 PROBLEM — H53.419: Status: ACTIVE | Noted: 2021-10-06

## 2021-10-14 ENCOUNTER — ANCILLARY PROCEDURE (OUTPATIENT)
Dept: CARDIOLOGY | Facility: CLINIC | Age: 71
End: 2021-10-14
Attending: NURSE PRACTITIONER
Payer: COMMERCIAL

## 2021-10-14 DIAGNOSIS — G45.8 OTHER TRANSIENT CEREBRAL ISCHEMIC ATTACKS AND RELATED SYNDROMES: ICD-10-CM

## 2021-10-14 DIAGNOSIS — R29.818 TRANSIENT NEUROLOGICAL SYMPTOMS: ICD-10-CM

## 2021-10-14 LAB — LVEF ECHO: NORMAL

## 2021-10-14 PROCEDURE — 93241 XTRNL ECG REC>48HR<7D: CPT | Performed by: INTERNAL MEDICINE

## 2021-10-14 PROCEDURE — 93306 TTE W/DOPPLER COMPLETE: CPT | Performed by: INTERNAL MEDICINE

## 2021-10-14 NOTE — PATIENT INSTRUCTIONS
Patient has been prescribed a ZioPatch holter for 7 days.  Patient was instructed regarding the indication, function, care and prompt return of the ZioPatch holter monitor. The monitor, with S/N [id number],  was placed on the patient with instructions regarding care of the skin, electrodes, and monitor, as well as documentation in the patient diary. Patient demonstrated understanding of this information and agreed to call [employer] with further questions or concerns.

## 2021-10-27 NOTE — RESULT ENCOUNTER NOTE
Kate Sandra,  I hope everything is well with you and your family.  I received to echo cardiogram results.  Good news-your echocardiogram was normal.   Let me know if you have any additional questions  Take care,  Roma

## 2021-11-02 ENCOUNTER — LAB (OUTPATIENT)
Dept: LAB | Facility: CLINIC | Age: 71
End: 2021-11-02
Payer: COMMERCIAL

## 2021-11-02 DIAGNOSIS — C61 PROSTATE CANCER (H): ICD-10-CM

## 2021-11-02 DIAGNOSIS — M10.9 ACUTE GOUT, UNSPECIFIED CAUSE, UNSPECIFIED SITE: ICD-10-CM

## 2021-11-02 DIAGNOSIS — H53.419: ICD-10-CM

## 2021-11-02 DIAGNOSIS — R29.818 TRANSIENT NEUROLOGICAL SYMPTOMS: ICD-10-CM

## 2021-11-02 LAB
ALBUMIN SERPL-MCNC: 3.8 G/DL (ref 3.4–5)
ALP SERPL-CCNC: 61 U/L (ref 40–150)
ALT SERPL W P-5'-P-CCNC: 122 U/L (ref 0–70)
ANION GAP SERPL CALCULATED.3IONS-SCNC: 5 MMOL/L (ref 3–14)
AST SERPL W P-5'-P-CCNC: 100 U/L (ref 0–45)
BASOPHILS # BLD AUTO: 0 10E3/UL (ref 0–0.2)
BASOPHILS NFR BLD AUTO: 1 %
BILIRUB SERPL-MCNC: 0.9 MG/DL (ref 0.2–1.3)
BUN SERPL-MCNC: 30 MG/DL (ref 7–30)
CALCIUM SERPL-MCNC: 11 MG/DL (ref 8.5–10.1)
CHLORIDE BLD-SCNC: 101 MMOL/L (ref 94–109)
CO2 SERPL-SCNC: 30 MMOL/L (ref 20–32)
CREAT SERPL-MCNC: 1.33 MG/DL (ref 0.66–1.25)
CRP SERPL-MCNC: 2.9 MG/L (ref 0–8)
EOSINOPHIL # BLD AUTO: 0.1 10E3/UL (ref 0–0.7)
EOSINOPHIL NFR BLD AUTO: 2 %
ERYTHROCYTE [DISTWIDTH] IN BLOOD BY AUTOMATED COUNT: 13.9 % (ref 10–15)
ERYTHROCYTE [SEDIMENTATION RATE] IN BLOOD BY WESTERGREN METHOD: 17 MM/HR (ref 0–20)
GFR SERPL CREATININE-BSD FRML MDRD: 54 ML/MIN/1.73M2
GLUCOSE BLD-MCNC: 116 MG/DL (ref 70–99)
HCT VFR BLD AUTO: 38.5 % (ref 40–53)
HGB BLD-MCNC: 13.2 G/DL (ref 13.3–17.7)
IMM GRANULOCYTES # BLD: 0 10E3/UL
IMM GRANULOCYTES NFR BLD: 0 %
LDH SERPL L TO P-CCNC: 228 U/L (ref 85–227)
LYMPHOCYTES # BLD AUTO: 2.1 10E3/UL (ref 0.8–5.3)
LYMPHOCYTES NFR BLD AUTO: 31 %
MCH RBC QN AUTO: 33.6 PG (ref 26.5–33)
MCHC RBC AUTO-ENTMCNC: 34.3 G/DL (ref 31.5–36.5)
MCV RBC AUTO: 98 FL (ref 78–100)
MONOCYTES # BLD AUTO: 0.8 10E3/UL (ref 0–1.3)
MONOCYTES NFR BLD AUTO: 11 %
NEUTROPHILS # BLD AUTO: 3.8 10E3/UL (ref 1.6–8.3)
NEUTROPHILS NFR BLD AUTO: 55 %
NRBC # BLD AUTO: 0 10E3/UL
NRBC BLD AUTO-RTO: 0 /100
PLATELET # BLD AUTO: 264 10E3/UL (ref 150–450)
POTASSIUM BLD-SCNC: 4.2 MMOL/L (ref 3.4–5.3)
PROT SERPL-MCNC: 7.9 G/DL (ref 6.8–8.8)
PSA SERPL-MCNC: 0.33 UG/L (ref 0–4)
RBC # BLD AUTO: 3.93 10E6/UL (ref 4.4–5.9)
SODIUM SERPL-SCNC: 136 MMOL/L (ref 133–144)
URATE SERPL-MCNC: 11.6 MG/DL (ref 3.5–7.2)
WBC # BLD AUTO: 6.8 10E3/UL (ref 4–11)

## 2021-11-02 PROCEDURE — 36415 COLL VENOUS BLD VENIPUNCTURE: CPT

## 2021-11-02 PROCEDURE — 86140 C-REACTIVE PROTEIN: CPT

## 2021-11-02 PROCEDURE — 85652 RBC SED RATE AUTOMATED: CPT

## 2021-11-02 ASSESSMENT — ENCOUNTER SYMPTOMS
FEVER: 0
CONSTIPATION: 0
NAUSEA: 0
DIFFICULTY URINATING: 0
MUSCLE CRAMPS: 0
WEAKNESS: 0
SPEECH CHANGE: 0
ORTHOPNEA: 0
POLYDIPSIA: 0
HEADACHES: 1
EYE REDNESS: 0
ABDOMINAL PAIN: 0
WEIGHT GAIN: 0
PALPITATIONS: 1
TREMORS: 0
BACK PAIN: 1
EYE WATERING: 0
ALTERED TEMPERATURE REGULATION: 0
DIARRHEA: 0
MEMORY LOSS: 0
RECTAL PAIN: 1
DECREASED APPETITE: 0
DISTURBANCES IN COORDINATION: 0
SYNCOPE: 0
MYALGIAS: 0
LIGHT-HEADEDNESS: 0
LEG PAIN: 0
JOINT SWELLING: 0
POLYPHAGIA: 0
STIFFNESS: 0
BLOOD IN STOOL: 0
PARALYSIS: 0
SEIZURES: 0
EYE IRRITATION: 0
FLANK PAIN: 0
DIZZINESS: 0
BLOATING: 0
BOWEL INCONTINENCE: 0
EXERCISE INTOLERANCE: 0
TINGLING: 0
INCREASED ENERGY: 0
NECK PAIN: 1
DYSURIA: 0
NUMBNESS: 1
SLEEP DISTURBANCES DUE TO BREATHING: 0
FATIGUE: 1
HALLUCINATIONS: 0
HYPOTENSION: 0
HEMATURIA: 0
LOSS OF CONSCIOUSNESS: 0
HYPERTENSION: 0
WEIGHT LOSS: 0
DOUBLE VISION: 0
HEARTBURN: 0
JAUNDICE: 0
MUSCLE WEAKNESS: 0
EYE PAIN: 0
ARTHRALGIAS: 0
VOMITING: 0
CHILLS: 0
NIGHT SWEATS: 1

## 2021-11-02 ASSESSMENT — HEADACHE IMPACT TEST (HIT 6)
WHEN YOU HAVE A HEADACHE HOW OFTEN DO YOU WISH YOU COULD LIE DOWN: SOMETIMES
HOW OFTEN DO HEADACHES LIMIT YOUR DAILY ACTIVITIES: RARELY
HOW OFTEN DID HEADACHS LIMIT CONCENTRATION ON WORK OR DAILY ACTIVITY: RARELY
HOW OFTEN HAVE YOU FELT TOO TIRED TO WORK BECAUSE OF YOUR HEADACHES: RARELY
HIT6 TOTAL SCORE: 52
HOW OFTEN HAVE YOU FELT FED UP OR IRRITATED BECAUSE OF YOUR HEADACHES: RARELY
WHEN YOU HAVE HEADACHES HOW OFTEN IS THE PAIN SEVERE: SOMETIMES

## 2021-11-03 NOTE — RESULT ENCOUNTER NOTE
Boaz,   Your inflammatory markers -ESR and CRP are normal. We'll wait for your imaging results.   Take care,  Roma

## 2021-11-04 ENCOUNTER — HOSPITAL ENCOUNTER (OUTPATIENT)
Dept: MRI IMAGING | Facility: CLINIC | Age: 71
End: 2021-11-04
Attending: NURSE PRACTITIONER
Payer: COMMERCIAL

## 2021-11-04 DIAGNOSIS — C61 PROSTATE CANCER (H): ICD-10-CM

## 2021-11-04 DIAGNOSIS — R29.818 TRANSIENT NEUROLOGICAL SYMPTOMS: ICD-10-CM

## 2021-11-04 DIAGNOSIS — I10 HYPERTENSION, UNSPECIFIED TYPE: ICD-10-CM

## 2021-11-04 PROCEDURE — 70549 MR ANGIOGRAPH NECK W/O&W/DYE: CPT

## 2021-11-04 PROCEDURE — 258N000003 HC RX IP 258 OP 636: Performed by: NURSE PRACTITIONER

## 2021-11-04 PROCEDURE — A9585 GADOBUTROL INJECTION: HCPCS | Performed by: NURSE PRACTITIONER

## 2021-11-04 PROCEDURE — 70544 MR ANGIOGRAPHY HEAD W/O DYE: CPT

## 2021-11-04 PROCEDURE — 70553 MRI BRAIN STEM W/O & W/DYE: CPT

## 2021-11-04 PROCEDURE — 255N000002 HC RX 255 OP 636: Performed by: NURSE PRACTITIONER

## 2021-11-04 RX ORDER — GADOBUTROL 604.72 MG/ML
11 INJECTION INTRAVENOUS ONCE
Status: COMPLETED | OUTPATIENT
Start: 2021-11-04 | End: 2021-11-04

## 2021-11-04 RX ADMIN — GADOBUTROL 11 ML: 604.72 INJECTION INTRAVENOUS at 08:57

## 2021-11-04 RX ADMIN — SODIUM CHLORIDE 50 ML: 9 INJECTION, SOLUTION INTRAVENOUS at 08:57

## 2021-11-06 NOTE — TELEPHONE ENCOUNTER
Routing refill request to provider for review/approval because:  Labs out of range:    Creatinine   Date Value Ref Range Status   11/02/2021 1.33 (H) 0.66 - 1.25 mg/dL Final   01/18/2021 0.89 0.66 - 1.25 mg/dL Final

## 2021-11-08 ENCOUNTER — OFFICE VISIT (OUTPATIENT)
Dept: NEUROLOGY | Facility: CLINIC | Age: 71
End: 2021-11-08
Payer: COMMERCIAL

## 2021-11-08 VITALS
DIASTOLIC BLOOD PRESSURE: 111 MMHG | HEART RATE: 97 BPM | WEIGHT: 239 LBS | BODY MASS INDEX: 36.34 KG/M2 | RESPIRATION RATE: 16 BRPM | SYSTOLIC BLOOD PRESSURE: 155 MMHG | OXYGEN SATURATION: 97 %

## 2021-11-08 DIAGNOSIS — C61 PROSTATE CANCER (H): ICD-10-CM

## 2021-11-08 DIAGNOSIS — R29.818 TRANSIENT NEUROLOGICAL SYMPTOMS: ICD-10-CM

## 2021-11-08 DIAGNOSIS — H53.419: ICD-10-CM

## 2021-11-08 DIAGNOSIS — R93.0 OPACIFICATION OF RIGHT MAXILLARY SINUS: ICD-10-CM

## 2021-11-08 DIAGNOSIS — R51.9 FRONTAL HEADACHE: Primary | ICD-10-CM

## 2021-11-08 PROCEDURE — 99215 OFFICE O/P EST HI 40 MIN: CPT | Performed by: NURSE PRACTITIONER

## 2021-11-08 RX ORDER — LISINOPRIL 20 MG/1
TABLET ORAL
Qty: 90 TABLET | Refills: 0 | Status: SHIPPED | OUTPATIENT
Start: 2021-11-08 | End: 2021-11-30

## 2021-11-08 RX ORDER — CHLORTHALIDONE 25 MG/1
TABLET ORAL
Qty: 90 TABLET | Refills: 0 | Status: SHIPPED | OUTPATIENT
Start: 2021-11-08 | End: 2022-01-06

## 2021-11-08 NOTE — PROGRESS NOTES
Subjective   Boaz is a 71 year old who presents for the following health issues -follow up and images results review  Initial visit on 10/05/2021, see note for details. Question TIA and work up recommended  Visual disturbances concerns-  Both eyes -everything gets brighter in the light but if he looks at darker areas-seems normal. No confusion, no seizures, no motor deficit. No speech problems.   Headaches frontal and focus around the eyes. Light sensitivity -three episodes but headache does not intensified.   Patient reports that 5-6 weeks after end of lupron injection -hot flashes improved and feeling normal and no headache.   Patient reports history of neck pain and compression fractures and naproxen and ibuprofen daily for a week and stopped 2 weeks ago.    Quit caffeine but drinks tea, lemon     Drinks 3-4 nights per week and drinks more when on lupron.   Episodes of gout since starting lupron  CMP -elevated creatinine and ALT, AST, elevated random glucose   Decreased hemoglobin -rectal bleeding had colonoscopy 2.5 months ago and no problems seen. No bleeding for almost two weeks after using hemorrhoid cream and really helped.      CRP -2.9  ESR 17    Plan   Increased hydration   Limit alcohol use  Do not use hot spices -might trigger headaches  Elevated BP today -follow up with PCP. Recheck Blood pressure at home.   ENT referral for right maxillary sinus opacification and frontal headache for a consult  We could try headache/migraine prevention -venlafaxine (Effexor), might consider propranolol or verapamil -Ok to review options with PCP.     Patient reports that at home -140 over 82-85. Patient did take his BP meds today at home -was 140/78    [facility] Eye specialist 09/08/2021-no glaucoma found and no other structural eye problems were identified     Right sinus opacified on brain MRI and frontal headache -ENT referral for evaluation        Objective    BP (!) 155/111   Pulse 97   Resp 16    Wt 108.4 kg (239 lb)   SpO2 97%   BMI 36.34 kg/m    Body mass index is 36.34 kg/m .   Manually rechecked 160/100 right arm   Not in pain today    Physical Exam   GENERAL: healthy, alert and no distress  EYES: Eyes grossly normal to inspection, PERRL and conjunctivae and sclerae normal  MS: no gross musculoskeletal defects noted, no edema  NEURO: Normal strength and tone, mentation intact and speech normal  PSYCH: mentation appears normal, affect normal/bright    DATA reviewed   ECHO and Holter -not reveiling      reviewed   MRI BRAIN WITHOUT AND WITH CONTRAST  11/4/2021 9:50 AM      HISTORY:  Transient ischemic attack (TIA). History of prostate cancer  and on Lupron therapy. Headache.     TECHNIQUE:  Multiplanar, multisequence MRI of the brain without and  with 11 mL Gadavist.     COMPARISON: None.      FINDINGS: There is no evidence of acute infarct, hemorrhage, mass, or  herniation. Mild patchy periventricular white matter T2  hyperintensities which are nonspecific, but likely related to chronic  microvascular ischemic disease. Ventricular size within normal limits  without hydrocephalus.      No suspicious intracranial enhancement. Linear enhancement in the  right periatrial white matter has the appearance of a developmental  venous anomaly.     Right maxillary sinus is completely opacified. The major arterial T2  flow voids at the base of the brain appear patent.                                                                       IMPRESSION:    1. No evidence of acute infarct, mass, hemorrhage, or herniation.  2. Mild nonspecific white matter changes likely due to chronic  microvascular ischemic disease. These are not unexpected in a patient  of this age.      TEJAL ARIZMENDI MD     I discussed all my recommendations with Boaz Acosta who verbalizes understanding and comfortable with the plan.  All of patient's questions were answered from the best of my knowledge.  Patient is in agreement with the plan.      51 minutes spent on the date of the encounter doing face to face , chart  review,  results review,  meds review, treatment plan, documentation and further activities as noted above    SHAMEKA Irizarry, CNP [facility]  Headache certified  [facility]

## 2021-11-08 NOTE — PATIENT INSTRUCTIONS
Plan   Increased hydration   Limit alcohol use  Do not use hot spices -might trigger headaches  Elevated BP today -follow up with PCP. Recheck Blood pressure at home.   ENT referral for right maxillary sinus opacification and frontal headache for a consult  We could try headache/migraine prevention -venlafaxine (Effexor), might consider propranolol or verapamil -Ok to review options with PCP.   Follow up as needed for headache management

## 2021-11-08 NOTE — LETTER
11/8/2021       RE: Boaz Acosta  [address] [id number]     Dear Colleague,    Thank you for referring your patient, Boaz Acotsa, to the [facility] [place] [facility]. Please see a copy of my visit note below.      Subjective   Boaz is a 71 year old who presents for the following health issues -follow up and images results review  Initial visit on 10/05/2021, see note for details. Question TIA and work up recommended  Visual disturbances concerns-  Both eyes -everything gets brighter in the light but if he looks at darker areas-seems normal. No confusion, no seizures, no motor deficit. No speech problems.   Headaches frontal and focus around the eyes. Light sensitivity -three episodes but headache does not intensified.   Patient reports that 5-6 weeks after end of lupron injection -hot flashes improved and feeling normal and no headache.   Patient reports history of neck pain and compression fractures and naproxen and ibuprofen daily for a week and stopped 2 weeks ago.    Quit caffeine but drinks tea, lemon     Drinks 3-4 nights per week and drinks more when on lupron.   Episodes of gout since starting lupron  CMP -elevated creatinine and ALT, AST, elevated random glucose   Decreased hemoglobin -rectal bleeding had colonoscopy 2.5 months ago and no problems seen. No bleeding for almost two weeks after using hemorrhoid cream and really helped.      CRP -2.9  ESR 17    Plan   Increased hydration   Limit alcohol use  Do not use hot spices -might trigger headaches  Elevated BP today -follow up with PCP. Recheck Blood pressure at home.   ENT referral for right maxillary sinus opacification and frontal headache for a consult  We could try headache/migraine prevention -venlafaxine (Effexor), might consider propranolol or verapamil -Ok to review options with PCP.     Patient reports that at home -140 over 82-85.  Patient did take his BP meds today at home -was 140/78    [facility] Eye specialist 09/08/2021-no glaucoma found and no other structural eye problems were identified     Right sinus opacified on brain MRI and frontal headache -ENT referral for evaluation        Objective    BP (!) 155/111   Pulse 97   Resp 16   Wt 108.4 kg (239 lb)   SpO2 97%   BMI 36.34 kg/m    Body mass index is 36.34 kg/m .   Manually rechecked 160/100 right arm   Not in pain today    Physical Exam   GENERAL: healthy, alert and no distress  EYES: Eyes grossly normal to inspection, PERRL and conjunctivae and sclerae normal  MS: no gross musculoskeletal defects noted, no edema  NEURO: Normal strength and tone, mentation intact and speech normal  PSYCH: mentation appears normal, affect normal/bright    DATA reviewed   ECHO and Holter -not reveiling      reviewed   MRI BRAIN WITHOUT AND WITH CONTRAST  11/4/2021 9:50 AM      HISTORY:  Transient ischemic attack (TIA). History of prostate cancer  and on Lupron therapy. Headache.     TECHNIQUE:  Multiplanar, multisequence MRI of the brain without and  with 11 mL Gadavist.     COMPARISON: None.      FINDINGS: There is no evidence of acute infarct, hemorrhage, mass, or  herniation. Mild patchy periventricular white matter T2  hyperintensities which are nonspecific, but likely related to chronic  microvascular ischemic disease. Ventricular size within normal limits  without hydrocephalus.      No suspicious intracranial enhancement. Linear enhancement in the  right periatrial white matter has the appearance of a developmental  venous anomaly.     Right maxillary sinus is completely opacified. The major arterial T2  flow voids at the base of the brain appear patent.                                                                       IMPRESSION:    1. No evidence of acute infarct, mass, hemorrhage, or herniation.  2. Mild nonspecific white matter changes likely due to chronic  microvascular  ischemic disease. These are not unexpected in a patient  of this age.      TEJAL ARIZMENDI MD     I discussed all my recommendations with Boaz Acosta who verbalizes understanding and comfortable with the plan.  All of patient's questions were answered from the best of my knowledge.  Patient is in agreement with the plan.     51 minutes spent on the date of the encounter doing face to face , chart  review,  results review,  meds review, treatment plan, documentation and further activities as noted above    SHAMEKA Irizarry, CNP [facility]  Headache certified  [facility]                Again, thank you for allowing me to participate in the care of your patient.      Sincerely,    SHAMEKA Schaeffer CNP

## 2021-11-10 NOTE — RESULT ENCOUNTER NOTE
Results normal and reviewed with the patient during follow up visit. See follow up note for details.

## 2021-11-15 DIAGNOSIS — M1A.9XX0 CHRONIC GOUT WITHOUT TOPHUS, UNSPECIFIED CAUSE, UNSPECIFIED SITE: Primary | ICD-10-CM

## 2021-11-15 RX ORDER — ALLOPURINOL 100 MG/1
100 TABLET ORAL DAILY
Qty: 30 TABLET | Refills: 2 | Status: SHIPPED | OUTPATIENT
Start: 2021-11-15 | End: 2021-12-13

## 2021-11-15 NOTE — TELEPHONE ENCOUNTER
FUTURE VISIT INFORMATION      FUTURE VISIT INFORMATION:    Date: 12/22/21    Time: Noon    Location: [facility]  REFERRAL INFORMATION:    Referring provider: Roma Todd NP    [facility]    Reason for visit/diagnosis: Frontal Headache, opacification of right maxillary sinus    RECORDS REQUESTED FROM:       Clinic name Comments Records Status Imaging Status   [facility] 11/8/21 - NEURO OV with Roma Todd NP [facility] 9/13/21 - [facility] OV with Dr. Powell [facility] - Imaging 11/4/21 - MRI Brain  11/4/21 - MRA Brain [facility] [place]

## 2021-11-30 ENCOUNTER — VIRTUAL VISIT (OUTPATIENT)
Dept: FAMILY MEDICINE | Facility: CLINIC | Age: 71
End: 2021-11-30
Payer: COMMERCIAL

## 2021-11-30 DIAGNOSIS — I10 UNCONTROLLED HYPERTENSION: Primary | ICD-10-CM

## 2021-11-30 PROCEDURE — 99214 OFFICE O/P EST MOD 30 MIN: CPT | Mod: 95 | Performed by: FAMILY MEDICINE

## 2021-11-30 RX ORDER — LISINOPRIL 30 MG/1
30 TABLET ORAL DAILY
Qty: 90 TABLET | Refills: 0 | Status: SHIPPED | OUTPATIENT
Start: 2021-11-30 | End: 2022-02-25

## 2021-11-30 NOTE — PROGRESS NOTES
Boaz is a 71 year old who is being evaluated via a billable video visit.      How would you like to obtain your AVS? MyChart  If the video visit is dropped, the invitation should be resent by: Text to cell phone: [phone]  Will anyone else be joining your video visit? No      Video Length 25 mins    Assessment & Plan       ICD-10-CM    1. Uncontrolled hypertension  I10 lisinopril (ZESTRIL) 30 MG tablet         Review of external notes as documented elsewhere in note    Patient Instructions   Boaz    It was a pleasure seeing you in clinic today.  Here's the plan:    1. Hypertension - stop chlorthalidone due to hypercalcemia, history of gout with elevated uric acid, decline in kidney function.  Increase lisinopril to 30mg.  Check bp as you have been and notify clinic in about 2 weeks.    Let me know if you have questions.    Gianluca Singh MD        Return in about 2 weeks (around 12/14/2021) for Hypertension.    Gianluca Singh MD  [facility]   Boaz is a 71 year old who presents for the following health issues     History of Present Illness       He eats 2-3 servings of fruits and vegetables daily.He consumes 1 sweetened beverage(s) daily.He exercises with enough effort to increase his heart rate 20 to 29 minutes per day.  He exercises with enough effort to increase his heart rate 5 days per week.   He is taking medications regularly.       Hypertension Follow-up      Do you check your blood pressure regularly outside of the clinic? Yes     Are you following a low salt diet? Yes    Are your blood pressures ever more than 140 on the top number (systolic) OR more   than 90 on the bottom number (diastolic), for example 140/90? Yes    Home pressures are normal  120's/70's  Has never been over 140/90 at home  Taking chlorthalidone and lisinopril  He has taken chlorthalidone in years past however unsure reason for discontinuation  currenltaddy has hypercalcemia, elevated uric  acid, history of gout, and decreased renal function     I the last 3 months he has had 15 light sensitivity episodes  Lateral periphery of right eye  collidoscope sensation in visual field          Review of Systems   Constitutional, HEENT, cardiovascular, pulmonary, gi and gu systems are negative, except as otherwise noted.      Objective           Vitals:  No vitals were obtained today due to virtual visit.    Physical Exam   GENERAL: Healthy, alert and no distress  EYES: Eyes grossly normal to inspection.  No discharge or erythema, or obvious scleral/conjunctival abnormalities.  RESP: No audible wheeze, cough, or visible cyanosis.  No visible retractions or increased work of breathing.    SKIN: Visible skin clear. No significant rash, abnormal pigmentation or lesions.  NEURO: Cranial nerves grossly intact.  Mentation and speech appropriate for age.  PSYCH: Mentation appears normal, affect normal/bright, judgement and insight intact, normal speech and appearance well-groomed.                Video-Visit Details    Type of service:  Video Visit    Video Length 25 mins    Originating Location (pt. Location): Home    Distant Location (provider location):  [facility]     Platform used for Video Visit: [employer]

## 2021-11-30 NOTE — PATIENT INSTRUCTIONS
Boaz    It was a pleasure seeing you in clinic today.  Here's the plan:    1. Hypertension - stop chlorthalidone due to hypercalcemia, history of gout with elevated uric acid, decline in kidney function.  Increase lisinopril to 30mg.  Check bp as you have been and notify clinic in about 2 weeks.    Let me know if you have questions.    Gianluca Singh MD

## 2021-12-08 DIAGNOSIS — M1A.9XX0 CHRONIC GOUT WITHOUT TOPHUS, UNSPECIFIED CAUSE, UNSPECIFIED SITE: ICD-10-CM

## 2021-12-10 NOTE — TELEPHONE ENCOUNTER
Routing refill request to provider for review/approval because:  Labs out of range:  Cr, Uric Acid    Creatinine   Date Value Ref Range Status   11/02/2021 1.33 (H) 0.66 - 1.25 mg/dL Final   01/18/2021 0.89 0.66 - 1.25 mg/dL Final     Uric Acid   Date Value Ref Range Status   11/02/2021 11.6 (H) 3.5 - 7.2 mg/dL Final   08/06/2019 5.0 3.5 - 7.2 mg/dL Final

## 2021-12-13 RX ORDER — ALLOPURINOL 100 MG/1
TABLET ORAL
Qty: 30 TABLET | Refills: 2 | Status: SHIPPED | OUTPATIENT
Start: 2021-12-13 | End: 2022-02-28

## 2021-12-22 ENCOUNTER — PRE VISIT (OUTPATIENT)
Dept: OTOLARYNGOLOGY | Facility: CLINIC | Age: 71
End: 2021-12-22

## 2022-01-03 ENCOUNTER — LAB (OUTPATIENT)
Dept: LAB | Facility: CLINIC | Age: 72
End: 2022-01-03
Payer: COMMERCIAL

## 2022-01-03 DIAGNOSIS — C61 PROSTATE CANCER (H): ICD-10-CM

## 2022-01-03 LAB
ALBUMIN SERPL-MCNC: 3.6 G/DL (ref 3.4–5)
ALP SERPL-CCNC: 56 U/L (ref 40–150)
ALT SERPL W P-5'-P-CCNC: 88 U/L (ref 0–70)
ANION GAP SERPL CALCULATED.3IONS-SCNC: 4 MMOL/L (ref 3–14)
AST SERPL W P-5'-P-CCNC: 72 U/L (ref 0–45)
BASOPHILS # BLD AUTO: 0 10E3/UL (ref 0–0.2)
BASOPHILS NFR BLD AUTO: 1 %
BILIRUB SERPL-MCNC: 0.6 MG/DL (ref 0.2–1.3)
BUN SERPL-MCNC: 11 MG/DL (ref 7–30)
CALCIUM SERPL-MCNC: 8.9 MG/DL (ref 8.5–10.1)
CHLORIDE BLD-SCNC: 110 MMOL/L (ref 94–109)
CO2 SERPL-SCNC: 29 MMOL/L (ref 20–32)
CREAT SERPL-MCNC: 0.85 MG/DL (ref 0.66–1.25)
EOSINOPHIL # BLD AUTO: 0.2 10E3/UL (ref 0–0.7)
EOSINOPHIL NFR BLD AUTO: 2 %
ERYTHROCYTE [DISTWIDTH] IN BLOOD BY AUTOMATED COUNT: 12.5 % (ref 10–15)
GFR SERPL CREATININE-BSD FRML MDRD: >90 ML/MIN/1.73M2
GLUCOSE BLD-MCNC: 120 MG/DL (ref 70–99)
HCT VFR BLD AUTO: 39 % (ref 40–53)
HGB BLD-MCNC: 13.2 G/DL (ref 13.3–17.7)
IMM GRANULOCYTES # BLD: 0 10E3/UL
IMM GRANULOCYTES NFR BLD: 0 %
LDH SERPL L TO P-CCNC: 206 U/L (ref 85–227)
LYMPHOCYTES # BLD AUTO: 2.5 10E3/UL (ref 0.8–5.3)
LYMPHOCYTES NFR BLD AUTO: 35 %
MCH RBC QN AUTO: 33.8 PG (ref 26.5–33)
MCHC RBC AUTO-ENTMCNC: 33.8 G/DL (ref 31.5–36.5)
MCV RBC AUTO: 100 FL (ref 78–100)
MONOCYTES # BLD AUTO: 0.6 10E3/UL (ref 0–1.3)
MONOCYTES NFR BLD AUTO: 8 %
NEUTROPHILS # BLD AUTO: 3.8 10E3/UL (ref 1.6–8.3)
NEUTROPHILS NFR BLD AUTO: 54 %
NRBC # BLD AUTO: 0 10E3/UL
NRBC BLD AUTO-RTO: 0 /100
PLATELET # BLD AUTO: 256 10E3/UL (ref 150–450)
POTASSIUM BLD-SCNC: 4.3 MMOL/L (ref 3.4–5.3)
PROT SERPL-MCNC: 7.3 G/DL (ref 6.8–8.8)
PSA SERPL-MCNC: 0.17 UG/L (ref 0–4)
RBC # BLD AUTO: 3.9 10E6/UL (ref 4.4–5.9)
SODIUM SERPL-SCNC: 143 MMOL/L (ref 133–144)
WBC # BLD AUTO: 7 10E3/UL (ref 4–11)

## 2022-01-03 PROCEDURE — 36415 COLL VENOUS BLD VENIPUNCTURE: CPT

## 2022-01-03 PROCEDURE — 83615 LACTATE (LD) (LDH) ENZYME: CPT

## 2022-01-03 PROCEDURE — 85025 COMPLETE CBC W/AUTO DIFF WBC: CPT

## 2022-01-03 PROCEDURE — 80053 COMPREHEN METABOLIC PANEL: CPT

## 2022-01-03 PROCEDURE — 84153 ASSAY OF PSA TOTAL: CPT

## 2022-01-03 PROCEDURE — 84403 ASSAY OF TOTAL TESTOSTERONE: CPT

## 2022-01-05 LAB — TESTOST SERPL-MCNC: 10 NG/DL (ref 240–950)

## 2022-01-06 ENCOUNTER — ONCOLOGY VISIT (OUTPATIENT)
Dept: ONCOLOGY | Facility: CLINIC | Age: 72
End: 2022-01-06
Attending: INTERNAL MEDICINE
Payer: COMMERCIAL

## 2022-01-06 VITALS
SYSTOLIC BLOOD PRESSURE: 185 MMHG | HEIGHT: 68 IN | OXYGEN SATURATION: 97 % | DIASTOLIC BLOOD PRESSURE: 93 MMHG | WEIGHT: 243 LBS | HEART RATE: 64 BPM | BODY MASS INDEX: 36.83 KG/M2

## 2022-01-06 DIAGNOSIS — E66.01 MORBID OBESITY (H): ICD-10-CM

## 2022-01-06 DIAGNOSIS — R79.89 ELEVATED LFTS: ICD-10-CM

## 2022-01-06 DIAGNOSIS — C61 PROSTATE CANCER (H): Primary | ICD-10-CM

## 2022-01-06 DIAGNOSIS — C79.82 METASTATIC ADENOCARCINOMA TO PROSTATE (H): ICD-10-CM

## 2022-01-06 PROCEDURE — 99214 OFFICE O/P EST MOD 30 MIN: CPT | Performed by: INTERNAL MEDICINE

## 2022-01-06 ASSESSMENT — PAIN SCALES - GENERAL: PAINLEVEL: NO PAIN (0)

## 2022-01-06 ASSESSMENT — MIFFLIN-ST. JEOR: SCORE: 1836.74

## 2022-01-06 NOTE — LETTER
1/6/2022         RE: Boaz Acosta  [address] [address]        Dear Colleague,    Thank you for referring your patient, Boaz Acosta, to the [facility]. Please see a copy of my visit note below.    [facility] CANCER CLINIC  FOLLOW-UP VISIT NOTE    PATIENT NAME: Boaz Acosta MRN # [id number]  DATE OF VISIT: Jan 6, 2022 YOB: 1950    REFERRING PROVIDER: Rocío Boss MD  [facility]  [address] [facility] [place] [address]    CANCER TYPE: Prostate adenocarcinoma  STAGE: IV  ECOG PS: 0    TREATMENT SUMMARY:  Boaz's annual screening PSA was noted to be elevated at 17 in 2012, and 21 in the following year and it was attributed to prostatitis. Eventually, he did change his primary care physician and his repeat PSA was noted to be elevated at 31 in 04/2014. He was then referred to Urology, and was seen by Dr. Pressley with Urology in 07/2014. He was worked up with a biopsy of the prostate on 08/01//2014, which revealed adenocarcinoma of the prostate with [place] score of 4+3 involving 12 of the 12 cores. He was staged with a PET/CT scan, which did not show any evidence of distant metastasis. There was evidence of increased FDG uptake throughout the prostate with a maximum SUV of 7.1. He had a bone scan done on 08/19/2014, which did not show any obvious evidence of metastasis. Mr. Acosta underwent a robotic-assisted radical prostatectomy and lymph node sampling performed by Dr. Hesham Prieto on 10/24/2014. The pathology from this revealed adenocarcinoma of the prostate with a Willis of 7 and a tertiary pattern of 5. There was extensive prostatic tissue involvement with over 75% of the tissue with evidence of disease. There was extensive extraprostatic extension involving the apex, right and left anterior and posterior base, and soft tissue around both seminal vesicles. There was bilateral seminal  vesicle invasion with involvement of vas deferens. Margins were involved with bladder neck and bilateral posterior seminal vesicle soft tissue apex. There was evidence of perineural invasion. The lymph node sample was negative for disease. His postoperative PSA in 12/2014 remained elevated at 7.1, and all the subsequent PSAs have remained positive. His restaging scans done in January and again in 03/2015, including a CT abdomen and pelvis and bone scans, have been negative. He was referred to Radiation Oncology for consideration of salvage radiation therapy, and was seen by Dr. Murphy on 03/24/2015. Dr. Murphy referred him to Dr. Zbigniew Chu at the [facility] to have a choline-11 PET/CT scan done. The choline-11 scan done at [place] demonstrated evidence of 2 retroperitoneal lymph nodes that are mildly positive for uptake. There was no abnormal uptake in the prostate bed to suggest local recurrence. He was started on bicalutamide, and received his first dose of Lupron on 05/12/2015.   He comes for a scheduled follow up visit     5/12/2015 9/2/2015 1/4/2016 6/20/2019 7/30/2020 7/15/2021   leuprolide  IM 30 mg 30 mg 30 mg 30 mg 30 mg 45 mg     CURRENT TREATMENT  Break period of intermittent androgen deprivation therapy    SUBJECTIVE   Boaz comes for a scheduled follow up visit on androgen ablation therapy.     Boaz was seen in person. Starting August last year he had about 16 episodes where he had loss of vision. He met ophthalmologist where he was told that it could be interaction of androgen deprivation therapy with his anti-hypertensives. He was extensively worked up by neurology.     He then followed by his PCP who discontinued his chlorthalidone. He has not noticed any loss of vision since then.     He is aware of his labs including his PSA. He is quite happy about his PSA.      He is aware of his PSA and testosterone values. He still has hot flashes.       He has been doing well and denies any other complains.      Wt  Readings from Last 10 Encounters:   01/06/22 110.2 kg (243 lb)   11/08/21 108.4 kg (239 lb)   07/29/21 111.9 kg (246 lb 9.6 oz)   07/15/21 112.9 kg (249 lb)   07/15/21 112.9 kg (249 lb)   04/21/21 113.4 kg (250 lb)   01/21/21 116.8 kg (257 lb 9.6 oz)   07/30/20 112.1 kg (247 lb 1.6 oz)   06/07/20 112.1 kg (247 lb 3.2 oz)   10/31/19 114.6 kg (252 lb 11.2 oz)       PAST MEDICAL HISTORY   1. Prostate cancer with disease metastatic to para-aortic nodes   2. Borderline HTN  3. ADALID an CPAP at night  4. GERD      CURRENT OUTPATIENT MEDICATIONS     Current Outpatient Medications   Medication Sig     allopurinol (ZYLOPRIM) 100 MG tablet TAKE 1 TABLET BY MOUTH EVERY DAY     ascorbic acid (VITAMIN C) 1000 MG TABS Take 1-2 tablets by mouth as needed  (Patient not taking: Reported on 11/8/2021)     chlorthalidone (HYGROTON) 25 MG tablet TAKE 1 TABLET BY MOUTH EVERY DAY     EPINEPHrine (EPIPEN 2-BRIAN) 0.3 MG/0.3ML injection Inject 0.3 mg into the muscle once as needed  (Patient not taking: Reported on 11/8/2021)     indomethacin (INDOCIN) 50 MG capsule Take 1 capsule (50 mg) by mouth 2 times daily (with meals)     Leuprolide Acetate, 4 Month, (LUPRON DEPOT, 4-MONTH, IM) Inject 45 mg into the muscle      lisinopril (ZESTRIL) 30 MG tablet Take 1 tablet (30 mg) by mouth daily     Multiple Vitamin (DAILY MULTIVITAMIN PO) Take 1 tablet by mouth daily.     ORDER FOR DME, SET TO FAX, PATIENT WAS SET UP ON A RESPIRONICS 560 AUTO MACHINE AT [employer] 9CMH2O WITH HEATED HUMIDITY. PATIENT HAD A CHOICE OF MASK AND CHOSE THE AIRFIT P10 SIZE MEDIUM NASAL PILLOW. HE HAS A F/U APPOINTMENT TO HIS SLEEP STUDY 2/5 WITH TURNER GLASER PA-C AND A SECOND ONE SCHEDULED IN 6 WEEKS.     UNABLE TO FIND MEDICATION NAME: Turkey tail supplement     No current facility-administered medications for this visit.         ALLERGIES   No Known Allergies     REVIEW OF SYSTEMS   As above in the HPI, o/w complete 12-point ROS was negative.     PHYSICAL EXAM   There were  no vitals taken for this visit.    SpO2 Readings from Last 4 Encounters:   06/18/18 96%   10/30/17 97%   10/16/17 97%   06/19/17 96%     Wt Readings from Last 3 Encounters:   11/08/21 108.4 kg (239 lb)   07/29/21 111.9 kg (246 lb 9.6 oz)   07/15/21 112.9 kg (249 lb)     GEN: NAD  HEENT: PERRL, EOMI, no icterus, injection or pallor. Oropharynx is clear.  NECK: no cervical or supraclavicular lymphadenopathy  LUNGS: clear bilaterally  CV: regular, no murmurs, rubs, or gallops  ABDOMEN: soft, non-tender, non-distended, normal bowel sounds, no hepatosplenomegaly by percussion or palpation  EXT: warm, well perfused, no edema  NEURO: alert  SKIN: lesion in subxiphoid (epigastrium) as below     LABORATORY AND IMAGING STUDIES     Recent Labs   Lab Test 01/03/22  0951 11/02/21  1342 07/12/21  0841 01/18/21  0757 07/30/20  0733    136 137 141 142   POTASSIUM 4.3 4.2 4.1 4.1 4.3   CHLORIDE 110* 101 104 107 110*   CO2 29 30 30 28 28   ANIONGAP 4 5 3 6 4   BUN 11 30 12 15 12   CR 0.85 1.33* 1.01 0.89 0.94   * 116* 118* 128* 114*   KERRY 8.9 11.0* 9.6 9.1 9.2     No results for input(s): MAG, PHOS in the last 03606 hours.  Recent Labs   Lab Test 01/03/22  0951 11/02/21  1342 07/12/21  0841 01/18/21  0757 07/30/20  0733   WBC 7.0 6.8 6.0 6.4 5.4   HGB 13.2* 13.2* 14.3 13.6 14.1    264 228 207 222    98 96 95 94   NEUTROPHIL 54 55 51 42.5 39.6     Recent Labs   Lab Test 01/03/22  0951 11/02/21  1342 07/12/21  0841   BILITOTAL 0.6 0.9 0.9   ALKPHOS 56 61 54   ALT 88* 122* 89*   AST 72* 100* 62*   ALBUMIN 3.6 3.8 3.5    228* 217     TSH   Date Value Ref Range Status   03/14/2005 2.22 0.4 - 5.0 mU/L Final     Recent Labs   Lab Test 01/03/22  0951 11/02/21  1342 07/12/21  0841 01/18/21  0757 07/30/20  0733 04/27/20  0803   PSA 0.17 0.33 6.79* 0.38 8.23* 3.30   TESTOSTTOTAL 10*  --  [phone] 954       ASSESSMENT AND PLAN   1. Prostate cancer with metastasis to retroperitoneal nodes with persistent PSA  elevation post prostatectomy; ish 4+3 disease, tertiary score of 5  2. Elevated LFT's, obesity, elevated blood pressure at this visit  3. ECOG PS 0  4. No other medical comorbiditiies    Boaz is alone at this clinic visit.  He had episodes of complete loss of vision as per him last August.  He feels that this happened over 16 times.  He was evaluated in ophthalmology and neurology.  Extensive work-up was negative.  His primary care physician discontinued the chlorthalidone that he had been on.  He has not noticed any recurrent symptoms since then.  Overall he has been feeling a lot better since the discontinuation of chlorthalidone.  He had some sort of mental fog and fatigue with chlorthalidone which has resolved.     I have reviewed all of the labs done prior to this clinic visit.  Labs are all completely normal including electrolytes, renal function, complete blood count and differential.  His hepatic panel does reveal persistent elevation in his transaminases which has been stable.     He is aware of his PSA values.  He notes that in fact his PSA was inadvertently drawn in November 2021 and he already noticed a decline from 6.79 ng/mL to 0.33 ng/mL at the time.  His PSA has dropped to 0.17 ng/mL at this visit.    His testosterone is low at 10 consistent with response to androgen deprivation therapy.  He continues to have some fatigue and hot flashes with the androgen deprivation therapy.  He would be happy to be done with this.     I again brought up his elevated liver function tests which are concerning for chronic inflammation of the liver likely from hepatic steatosis.  I offered a consultation in hematology to further review this and work this up.  We will defer it at this time.  I encouraged him to lose weight as this should help both of his elevated LFTs and blood pressure.  He does understand this and attributes the weight gain to androgen deprivation therapy.  Besides he states he is unable to do  exercise as people do not wear masks in the gym.  We have discussed weight loss at several of 4 visits and he is aware of this.  He is going to make concerted efforts to lose weight.    I will again see him in 6 months with labs a week prior to visit including CBC, CMP, PSA, testosterone.    25 minutes spent on the date of the encounter doing chart review, history and exam, documentation and further activities as noted above     Pavel Norton  ,  Division of Hematology, Oncology & Transplantation  [facility].         Again, thank you for allowing me to participate in the care of your patient.        Sincerely,        Pavel Norton MD

## 2022-01-06 NOTE — PROGRESS NOTES
[facility] CANCER CLINIC  FOLLOW-UP VISIT NOTE    PATIENT NAME: Boaz Acosta MRN # [id number]  DATE OF VISIT: Jan 6, 2022 YOB: 1950    REFERRING PROVIDER: Rocío Boss MD  [address] [address]    CANCER TYPE: Prostate adenocarcinoma  STAGE: IV  ECOG PS: 0    TREATMENT SUMMARY:  Boaz's annual screening PSA was noted to be elevated at 17 in 2012, and 21 in the following year and it was attributed to prostatitis. Eventually, he did change his primary care physician and his repeat PSA was noted to be elevated at 31 in 04/2014. He was then referred to Urology, and was seen by Dr. Pressley with Urology in 07/2014. He was worked up with a biopsy of the prostate on 08/01//2014, which revealed adenocarcinoma of the prostate with Willis score of 4+3 involving 12 of the 12 cores. He was staged with a PET/CT scan, which did not show any evidence of distant metastasis. There was evidence of increased FDG uptake throughout the prostate with a maximum SUV of 7.1. He had a bone scan done on 08/19/2014, which did not show any obvious evidence of metastasis. Mr. Acosta underwent a robotic-assisted radical prostatectomy and lymph node sampling performed by Dr. Hesham Prieto on 10/24/2014. The pathology from this revealed adenocarcinoma of the prostate with a Willis of 7 and a tertiary pattern of 5. There was extensive prostatic tissue involvement with over 75% of the tissue with evidence of disease. There was extensive extraprostatic extension involving the apex, right and left anterior and posterior base, and soft tissue around both seminal vesicles. There was bilateral seminal vesicle invasion with involvement of vas deferens. Margins were involved with bladder neck and bilateral posterior seminal vesicle soft tissue apex. There was evidence of perineural invasion. The lymph node sample was negative for disease. His postoperative PSA  in 12/2014 remained elevated at 7.1, and all the subsequent PSAs have remained positive. His restaging scans done in January and again in 03/2015, including a CT abdomen and pelvis and bone scans, have been negative. He was referred to Radiation Oncology for consideration of salvage radiation therapy, and was seen by Dr. Murphy on 03/24/2015. Dr. Murphy referred him to Dr. Zbigniew Chu at the [facility] to have a choline-11 PET/CT scan done. The choline-11 scan done at [place] demonstrated evidence of 2 retroperitoneal lymph nodes that are mildly positive for uptake. There was no abnormal uptake in the prostate bed to suggest local recurrence. He was started on bicalutamide, and received his first dose of Lupron on 05/12/2015.   He comes for a scheduled follow up visit     5/12/2015 9/2/2015 1/4/2016 6/20/2019 7/30/2020 7/15/2021   leuprolide  IM 30 mg 30 mg 30 mg 30 mg 30 mg 45 mg     CURRENT TREATMENT  Break period of intermittent androgen deprivation therapy    SUBJECTIVE   Boaz comes for a scheduled follow up visit on androgen ablation therapy.     Boaz was seen in person. Starting August last year he had about 16 episodes where he had loss of vision. He met ophthalmologist where he was told that it could be interaction of androgen deprivation therapy with his anti-hypertensives. He was extensively worked up by neurology.     He then followed by his PCP who discontinued his chlorthalidone. He has not noticed any loss of vision since then.     He is aware of his labs including his PSA. He is quite happy about his PSA.      He is aware of his PSA and testosterone values. He still has hot flashes.       He has been doing well and denies any other complains.      Wt Readings from Last 10 Encounters:   01/06/22 110.2 kg (243 lb)   11/08/21 108.4 kg (239 lb)   07/29/21 111.9 kg (246 lb 9.6 oz)   07/15/21 112.9 kg (249 lb)   07/15/21 112.9 kg (249 lb)   04/21/21 113.4 kg (250 lb)   01/21/21 116.8 kg (257 lb 9.6 oz)   07/30/20  112.1 kg (247 lb 1.6 oz)   06/07/20 112.1 kg (247 lb 3.2 oz)   10/31/19 114.6 kg (252 lb 11.2 oz)       PAST MEDICAL HISTORY   1. Prostate cancer with disease metastatic to para-aortic nodes   2. Borderline HTN  3. ADALID an CPAP at night  4. GERD      CURRENT OUTPATIENT MEDICATIONS     Current Outpatient Medications   Medication Sig     allopurinol (ZYLOPRIM) 100 MG tablet TAKE 1 TABLET BY MOUTH EVERY DAY     ascorbic acid (VITAMIN C) 1000 MG TABS Take 1-2 tablets by mouth as needed  (Patient not taking: Reported on 11/8/2021)     chlorthalidone (HYGROTON) 25 MG tablet TAKE 1 TABLET BY MOUTH EVERY DAY     EPINEPHrine (EPIPEN 2-BRIAN) 0.3 MG/0.3ML injection Inject 0.3 mg into the muscle once as needed  (Patient not taking: Reported on 11/8/2021)     indomethacin (INDOCIN) 50 MG capsule Take 1 capsule (50 mg) by mouth 2 times daily (with meals)     Leuprolide Acetate, 4 Month, (LUPRON DEPOT, 4-MONTH, IM) Inject 45 mg into the muscle      lisinopril (ZESTRIL) 30 MG tablet Take 1 tablet (30 mg) by mouth daily     Multiple Vitamin (DAILY MULTIVITAMIN PO) Take 1 tablet by mouth daily.     ORDER FOR DME, SET TO FAX, PATIENT WAS SET UP ON A RESPIRONICS 560 AUTO MACHINE AT [employer] 9CMH2O WITH HEATED HUMIDITY. PATIENT HAD A CHOICE OF MASK AND CHOSE THE AIRFIT P10 SIZE MEDIUM NASAL PILLOW. HE HAS A F/U APPOINTMENT TO HIS SLEEP STUDY 2/5 WITH TURNER GLASER PA-C AND A SECOND ONE SCHEDULED IN 6 WEEKS.     UNABLE TO FIND MEDICATION NAME: Turkey tail supplement     No current facility-administered medications for this visit.         ALLERGIES   No Known Allergies     REVIEW OF SYSTEMS   As above in the HPI, o/w complete 12-point ROS was negative.     PHYSICAL EXAM   There were no vitals taken for this visit.    SpO2 Readings from Last 4 Encounters:   06/18/18 96%   10/30/17 97%   10/16/17 97%   06/19/17 96%     Wt Readings from Last 3 Encounters:   11/08/21 108.4 kg (239 lb)   07/29/21 111.9 kg (246 lb 9.6 oz)   07/15/21 112.9 kg (249  lb)     GEN: NAD  HEENT: PERRL, EOMI, no icterus, injection or pallor. Oropharynx is clear.  NECK: no cervical or supraclavicular lymphadenopathy  LUNGS: clear bilaterally  CV: regular, no murmurs, rubs, or gallops  ABDOMEN: soft, non-tender, non-distended, normal bowel sounds, no hepatosplenomegaly by percussion or palpation  EXT: warm, well perfused, no edema  NEURO: alert  SKIN: lesion in subxiphoid (epigastrium) as below     LABORATORY AND IMAGING STUDIES     Recent Labs   Lab Test 01/03/22  0951 11/02/21  1342 07/12/21  0841 01/18/21  0757 07/30/20  0733    136 137 141 142   POTASSIUM 4.3 4.2 4.1 4.1 4.3   CHLORIDE 110* 101 104 107 110*   CO2 29 30 30 28 28   ANIONGAP 4 5 3 6 4   BUN 11 30 12 15 12   CR 0.85 1.33* 1.01 0.89 0.94   * 116* 118* 128* 114*   KERRY 8.9 11.0* 9.6 9.1 9.2     No results for input(s): MAG, PHOS in the last 82054 hours.  Recent Labs   Lab Test 01/03/22  0951 11/02/21  1342 07/12/21  0841 01/18/21  0757 07/30/20  0733   WBC 7.0 6.8 6.0 6.4 5.4   HGB 13.2* 13.2* 14.3 13.6 14.1    264 228 207 222    98 96 95 94   NEUTROPHIL 54 55 51 42.5 39.6     Recent Labs   Lab Test 01/03/22  0951 11/02/21  1342 07/12/21  0841   BILITOTAL 0.6 0.9 0.9   ALKPHOS 56 61 54   ALT 88* 122* 89*   AST 72* 100* 62*   ALBUMIN 3.6 3.8 3.5    228* 217     TSH   Date Value Ref Range Status   03/14/2005 2.22 0.4 - 5.0 mU/L Final     Recent Labs   Lab Test 01/03/22  0951 11/02/21  1342 07/12/21  0841 01/18/21  0757 07/30/20  0733 04/27/20  0803   PSA 0.17 0.33 6.79* 0.38 8.23* 3.30   TESTOSTTOTAL 10*  --  [phone] 754       ASSESSMENT AND PLAN   1. Prostate cancer with metastasis to retroperitoneal nodes with persistent PSA elevation post prostatectomy; ish 4+3 disease, tertiary score of 5  2. Elevated LFT's, obesity, elevated blood pressure at this visit  3. ECOG PS 0  4. No other medical comorbiditiies    Boaz is alone at this clinic visit.  He had episodes of complete loss of  vision as per him last August.  He feels that this happened over 16 times.  He was evaluated in ophthalmology and neurology.  Extensive work-up was negative.  His primary care physician discontinued the chlorthalidone that he had been on.  He has not noticed any recurrent symptoms since then.  Overall he has been feeling a lot better since the discontinuation of chlorthalidone.  He had some sort of mental fog and fatigue with chlorthalidone which has resolved.     I have reviewed all of the labs done prior to this clinic visit.  Labs are all completely normal including electrolytes, renal function, complete blood count and differential.  His hepatic panel does reveal persistent elevation in his transaminases which has been stable.     He is aware of his PSA values.  He notes that in fact his PSA was inadvertently drawn in November 2021 and he already noticed a decline from 6.79 ng/mL to 0.33 ng/mL at the time.  His PSA has dropped to 0.17 ng/mL at this visit.    His testosterone is low at 10 consistent with response to androgen deprivation therapy.  He continues to have some fatigue and hot flashes with the androgen deprivation therapy.  He would be happy to be done with this.     I again brought up his elevated liver function tests which are concerning for chronic inflammation of the liver likely from hepatic steatosis.  I offered a consultation in hematology to further review this and work this up.  We will defer it at this time.  I encouraged him to lose weight as this should help both of his elevated LFTs and blood pressure.  He does understand this and attributes the weight gain to androgen deprivation therapy.  Besides he states he is unable to do exercise as people do not wear masks in the gym.  We have discussed weight loss at several of 4 visits and he is aware of this.  He is going to make concerted efforts to lose weight.    I will again see him in 6 months with labs a week prior to visit including CBC,  CMP, PSA, testosterone.    25 minutes spent on the date of the encounter doing chart review, history and exam, documentation and further activities as noted above     Pavel Norton  ,  Division of Hematology, Oncology & Transplantation  [facility].

## 2022-01-06 NOTE — NURSING NOTE
"Oncology Rooming Note    January 6, 2022 8:12 AM   Boaz Acosta is a 71 year old male who presents for:    Chief Complaint   Patient presents with     Oncology Clinic Visit     follow up     Initial Vitals: BP (!) 185/93 (BP Location: Left arm, Patient Position: Chair, Cuff Size: Adult Large)   Pulse 64   Ht 1.727 m (5' 8\")   Wt 110.2 kg (243 lb)   SpO2 97%   BMI 36.95 kg/m   Estimated body mass index is 36.95 kg/m  as calculated from the following:    Height as of this encounter: 1.727 m (5' 8\").    Weight as of this encounter: 110.2 kg (243 lb). Body surface area is 2.3 meters squared.  No Pain (0) Comment: Data Unavailable   No LMP for male patient.  Allergies reviewed: Yes  Medications reviewed: Yes    Medications: Medication refills not needed today.  Pharmacy name entered into [facility]: [facility] [id number] [place] [address]    Clinical concerns: NO Dr. Norton was notified.      Juliette Salvador CMA              "

## 2022-02-19 ENCOUNTER — HEALTH MAINTENANCE LETTER (OUTPATIENT)
Age: 72
End: 2022-02-19

## 2022-02-19 DIAGNOSIS — I10 UNCONTROLLED HYPERTENSION: ICD-10-CM

## 2022-02-19 NOTE — TELEPHONE ENCOUNTER
Routing refill request to provider for review/approval because:  Drug not on the [facility] refill protocol   BP Readings from Last 3 Encounters:   01/06/22 (!) 185/93   11/08/21 (!) 155/111   07/29/21 135/85     Creatinine   Date Value Ref Range Status   01/03/2022 0.85 0.66 - 1.25 mg/dL Final   01/18/2021 0.89 0.66 - 1.25 mg/dL Final

## 2022-02-24 RX ORDER — LISINOPRIL 30 MG/1
TABLET ORAL
Qty: 90 TABLET | Refills: 0 | OUTPATIENT
Start: 2022-02-24

## 2022-02-24 NOTE — TELEPHONE ENCOUNTER
Patient needs visit for refill. Was supposed to have 2 week follow-up after last visit.    Gianluca Singh MD

## 2022-02-25 RX ORDER — LISINOPRIL 30 MG/1
30 TABLET ORAL DAILY
Qty: 30 TABLET | Refills: 0 | Status: SHIPPED | OUTPATIENT
Start: 2022-02-25 | End: 2022-02-28

## 2022-02-28 ENCOUNTER — VIRTUAL VISIT (OUTPATIENT)
Dept: FAMILY MEDICINE | Facility: CLINIC | Age: 72
End: 2022-02-28
Payer: COMMERCIAL

## 2022-02-28 DIAGNOSIS — M1A.9XX0 CHRONIC GOUT WITHOUT TOPHUS, UNSPECIFIED CAUSE, UNSPECIFIED SITE: ICD-10-CM

## 2022-02-28 DIAGNOSIS — I10 UNCONTROLLED HYPERTENSION: ICD-10-CM

## 2022-02-28 PROCEDURE — 99214 OFFICE O/P EST MOD 30 MIN: CPT | Mod: 95 | Performed by: FAMILY MEDICINE

## 2022-02-28 RX ORDER — ALLOPURINOL 100 MG/1
100 TABLET ORAL DAILY
Qty: 30 TABLET | Refills: 2 | Status: CANCELLED | OUTPATIENT
Start: 2022-02-28

## 2022-02-28 RX ORDER — LISINOPRIL 30 MG/1
30 TABLET ORAL DAILY
Qty: 90 TABLET | Refills: 1 | Status: SHIPPED | OUTPATIENT
Start: 2022-02-28 | End: 2022-09-05

## 2022-02-28 NOTE — PROGRESS NOTES
Boaz is a 71 year old who is being evaluated via a billable telephone visit.      What phone number would you like to be contacted at? [phone]  How would you like to obtain your AVS? MyChart    Assessment & Plan       ICD-10-CM    1. Chronic gout without tophus, unspecified cause, unspecified site  M1A.9XX0    2. Uncontrolled hypertension  I10 lisinopril (ZESTRIL) 30 MG tablet     Refill of lisinopril  Patient do trial off allopurinol given uric acid improvement    Review of external notes as documented elsewhere in note        Return in about 3 months (around 5/28/2022) for For Re-Assessment.    Gianluca Singh MD  [facility]    Subjective   Boaz is a 71 year old who presents for the following health issues     History of Present Illness       Hypertension: He presents for follow up of hypertension.  He does check blood pressure  regularly outside of the clinic. Outside blood pressures have been over 140/90. He follows a low salt diet.     He eats 4 or more servings of fruits and vegetables daily.He consumes 0 sweetened beverage(s) daily.He exercises with enough effort to increase his heart rate 30 to 60 minutes per day.  He exercises with enough effort to increase his heart rate 4 days per week.   He is taking medications regularly.     BP Readings from Last 6 Encounters:   01/06/22 (!) 185/93   11/08/21 (!) 155/111   07/29/21 135/85   07/15/21 (!) 200/80   07/15/21 (!) 202/98   01/21/21 (!) 171/82     HTN  Systolic - 128-135  Diastolic - around 80    Allopurinol - low dose - wondering if this can be stopped  No gout episodes  Needs uric acid recheck          Review of Systems   Constitutional, HEENT, cardiovascular, pulmonary, gi and gu systems are negative, except as otherwise noted.      Objective    Vitals - Patient Reported  Systolic (Patient Reported): (S) 130  Diastolic (Patient Reported): (S) 80      Vitals:  No vitals were obtained today due to virtual visit.    Physical Exam    healthy, alert and no distress  PSYCH: Alert and oriented times 3; coherent speech, normal   rate and volume, able to articulate logical thoughts, able   to abstract reason, no tangential thoughts, no hallucinations   or delusions  His affect is normal  RESP: No cough, no audible wheezing, able to talk in full sentences  Remainder of exam unable to be completed due to telephone visits                Phone call duration: 14 minutes

## 2022-06-30 ENCOUNTER — LAB (OUTPATIENT)
Dept: LAB | Facility: CLINIC | Age: 72
End: 2022-06-30
Payer: COMMERCIAL

## 2022-06-30 DIAGNOSIS — E66.01 MORBID OBESITY (H): ICD-10-CM

## 2022-06-30 DIAGNOSIS — C79.82 METASTATIC ADENOCARCINOMA TO PROSTATE (H): ICD-10-CM

## 2022-06-30 DIAGNOSIS — C61 PROSTATE CANCER (H): ICD-10-CM

## 2022-06-30 DIAGNOSIS — Z11.59 NEED FOR HEPATITIS C SCREENING TEST: Primary | ICD-10-CM

## 2022-06-30 DIAGNOSIS — R79.89 ELEVATED LFTS: ICD-10-CM

## 2022-06-30 LAB
ALBUMIN SERPL-MCNC: 3.5 G/DL (ref 3.4–5)
ALP SERPL-CCNC: 53 U/L (ref 40–150)
ALT SERPL W P-5'-P-CCNC: 43 U/L (ref 0–70)
ANION GAP SERPL CALCULATED.3IONS-SCNC: 4 MMOL/L (ref 3–14)
AST SERPL W P-5'-P-CCNC: 35 U/L (ref 0–45)
BASOPHILS # BLD AUTO: 0.1 10E3/UL (ref 0–0.2)
BASOPHILS NFR BLD AUTO: 1 %
BILIRUB SERPL-MCNC: 0.3 MG/DL (ref 0.2–1.3)
BUN SERPL-MCNC: 21 MG/DL (ref 7–30)
CALCIUM SERPL-MCNC: 9.4 MG/DL (ref 8.5–10.1)
CHLORIDE BLD-SCNC: 109 MMOL/L (ref 94–109)
CO2 SERPL-SCNC: 26 MMOL/L (ref 20–32)
CREAT SERPL-MCNC: 1.01 MG/DL (ref 0.66–1.25)
EOSINOPHIL # BLD AUTO: 0.2 10E3/UL (ref 0–0.7)
EOSINOPHIL NFR BLD AUTO: 2 %
ERYTHROCYTE [DISTWIDTH] IN BLOOD BY AUTOMATED COUNT: 13.4 % (ref 10–15)
GFR SERPL CREATININE-BSD FRML MDRD: 80 ML/MIN/1.73M2
GGT SERPL-CCNC: 85 U/L (ref 0–75)
GLUCOSE BLD-MCNC: 111 MG/DL (ref 70–99)
HCT VFR BLD AUTO: 38.1 % (ref 40–53)
HCV AB SERPL QL IA: NONREACTIVE
HGB BLD-MCNC: 12.8 G/DL (ref 13.3–17.7)
IMM GRANULOCYTES # BLD: 0 10E3/UL
IMM GRANULOCYTES NFR BLD: 0 %
LDH SERPL L TO P-CCNC: 202 U/L (ref 85–227)
LYMPHOCYTES # BLD AUTO: 2.4 10E3/UL (ref 0.8–5.3)
LYMPHOCYTES NFR BLD AUTO: 32 %
MCH RBC QN AUTO: 32.8 PG (ref 26.5–33)
MCHC RBC AUTO-ENTMCNC: 33.6 G/DL (ref 31.5–36.5)
MCV RBC AUTO: 98 FL (ref 78–100)
MONOCYTES # BLD AUTO: 0.7 10E3/UL (ref 0–1.3)
MONOCYTES NFR BLD AUTO: 9 %
NEUTROPHILS # BLD AUTO: 4.1 10E3/UL (ref 1.6–8.3)
NEUTROPHILS NFR BLD AUTO: 56 %
NRBC # BLD AUTO: 0 10E3/UL
NRBC BLD AUTO-RTO: 0 /100
PLATELET # BLD AUTO: 250 10E3/UL (ref 150–450)
POTASSIUM BLD-SCNC: 4.7 MMOL/L (ref 3.4–5.3)
PROT SERPL-MCNC: 7.5 G/DL (ref 6.8–8.8)
PSA SERPL-MCNC: 0.86 UG/L (ref 0–4)
RBC # BLD AUTO: 3.9 10E6/UL (ref 4.4–5.9)
SODIUM SERPL-SCNC: 139 MMOL/L (ref 133–144)
WBC # BLD AUTO: 7.3 10E3/UL (ref 4–11)

## 2022-06-30 PROCEDURE — 36415 COLL VENOUS BLD VENIPUNCTURE: CPT

## 2022-06-30 PROCEDURE — 82977 ASSAY OF GGT: CPT

## 2022-06-30 PROCEDURE — 85025 COMPLETE CBC W/AUTO DIFF WBC: CPT

## 2022-06-30 PROCEDURE — 86803 HEPATITIS C AB TEST: CPT

## 2022-06-30 PROCEDURE — 83615 LACTATE (LD) (LDH) ENZYME: CPT

## 2022-06-30 PROCEDURE — 84403 ASSAY OF TOTAL TESTOSTERONE: CPT

## 2022-06-30 PROCEDURE — 84153 ASSAY OF PSA TOTAL: CPT

## 2022-06-30 PROCEDURE — 80053 COMPREHEN METABOLIC PANEL: CPT

## 2022-07-03 LAB — TESTOST SERPL-MCNC: 638 NG/DL (ref 240–950)

## 2022-07-07 ENCOUNTER — ONCOLOGY VISIT (OUTPATIENT)
Dept: ONCOLOGY | Facility: CLINIC | Age: 72
End: 2022-07-07
Payer: COMMERCIAL

## 2022-07-07 VITALS
DIASTOLIC BLOOD PRESSURE: 84 MMHG | TEMPERATURE: 98.6 F | OXYGEN SATURATION: 97 % | SYSTOLIC BLOOD PRESSURE: 168 MMHG | HEART RATE: 65 BPM | BODY MASS INDEX: 36.78 KG/M2 | WEIGHT: 242.7 LBS | HEIGHT: 68 IN

## 2022-07-07 DIAGNOSIS — E66.01 MORBID OBESITY (H): ICD-10-CM

## 2022-07-07 DIAGNOSIS — R79.89 ELEVATED LFTS: ICD-10-CM

## 2022-07-07 DIAGNOSIS — C61 PROSTATE CANCER (H): Primary | ICD-10-CM

## 2022-07-07 PROCEDURE — 99214 OFFICE O/P EST MOD 30 MIN: CPT | Performed by: INTERNAL MEDICINE

## 2022-07-07 ASSESSMENT — PAIN SCALES - GENERAL: PAINLEVEL: NO PAIN (0)

## 2022-07-07 NOTE — LETTER
7/7/2022         RE: Boaz Acosta  [address] [address]        Dear Colleague,    Thank you for referring your patient, Boaz Acosta, to the [facility]. Please see a copy of my visit note below.    [facility]  FOLLOW-UP VISIT NOTE    PATIENT NAME: Boaz Acosta MRN # [id number]  DATE OF VISIT: Jul 7, 2022 YOB: 1950    REFERRING PROVIDER: Rocío Boss MD  [facility]  [address] [place] [address]    CANCER TYPE: Prostate adenocarcinoma  STAGE: IV  ECOG PS: 0    TREATMENT SUMMARY:  Boaz's annual screening PSA was noted to be elevated at 17 in 2012, and 21 in the following year and it was attributed to prostatitis. Eventually, he did change his primary care physician and his repeat PSA was noted to be elevated at 31 in 04/2014. He was then referred to Urology, and was seen by Dr. Pressley with Urology in 07/2014. He was worked up with a biopsy of the prostate on 08/01//2014, which revealed adenocarcinoma of the prostate with Dallas score of 4+3 involving 12 of the 12 cores. He was staged with a PET/CT scan, which did not show any evidence of distant metastasis. There was evidence of increased FDG uptake throughout the prostate with a maximum SUV of 7.1. He had a bone scan done on 08/19/2014, which did not show any obvious evidence of metastasis. Mr. Acosta underwent a robotic-assisted radical prostatectomy and lymph node sampling performed by Dr. Hesham Prieto on 10/24/2014. The pathology from this revealed adenocarcinoma of the prostate with a Willis of 7 and a tertiary pattern of 5. There was extensive prostatic tissue involvement with over 75% of the tissue with evidence of disease. There was extensive extraprostatic extension involving the apex, right and left anterior and posterior base, and soft tissue around both seminal vesicles. There was bilateral seminal  vesicle invasion with involvement of vas deferens. Margins were involved with bladder neck and bilateral posterior seminal vesicle soft tissue apex. There was evidence of perineural invasion. The lymph node sample was negative for disease. His postoperative PSA in 12/2014 remained elevated at 7.1, and all the subsequent PSAs have remained positive. His restaging scans done in January and again in 03/2015, including a CT abdomen and pelvis and bone scans, have been negative. He was referred to Radiation Oncology for consideration of salvage radiation therapy, and was seen by Dr. Murphy on 03/24/2015. Dr. Murphy referred him to Dr. Zbigniew Chu at the [facility] to have a choline-11 PET/CT scan done. The choline-11 scan done at [facility] demonstrated evidence of 2 retroperitoneal lymph nodes that are mildly positive for uptake. There was no abnormal uptake in the prostate bed to suggest local recurrence. He was started on bicalutamide, and received his first dose of Lupron on 05/12/2015.   He comes for a scheduled follow up visit     5/12/2015 9/2/2015 1/4/2016 6/20/2019 7/30/2020 7/15/2021   leuprolide  IM 30 mg 30 mg 30 mg 30 mg 30 mg 45 mg     CURRENT TREATMENT  Break period of intermittent androgen deprivation therapy    SUBJECTIVE   Boaz comes for a scheduled follow up visit on androgen ablation therapy.     Boaz was seen in person.  He has been in good health since last visit.  He denies any new physical complaints.  He had been flyfishing for 6 hours yesterday.  He has been trying to spend time outdoors.    He has been trying to lose weight but ends up losing 10 pounds and then regained side.  His primary care physician has offered to help him.    He is aware of his labs including his PSA. He is quite happy about his PSA.     He has been doing well and denies any other complains.      Wt Readings from Last 10 Encounters:   07/07/22 110.1 kg (242 lb 11.2 oz)   01/06/22 110.2 kg (243 lb)   11/08/21 108.4 kg (239 lb)  "  07/29/21 111.9 kg (246 lb 9.6 oz)   07/15/21 112.9 kg (249 lb)   07/15/21 112.9 kg (249 lb)   04/21/21 113.4 kg (250 lb)   01/21/21 116.8 kg (257 lb 9.6 oz)   07/30/20 112.1 kg (247 lb 1.6 oz)   06/07/20 112.1 kg (247 lb 3.2 oz)       PAST MEDICAL HISTORY   1. Prostate cancer with disease metastatic to para-aortic nodes   2. Borderline HTN  3. ADALID an CPAP at night  4. GERD      CURRENT OUTPATIENT MEDICATIONS     Current Outpatient Medications   Medication Sig     ascorbic acid (VITAMIN C) 1000 MG TABS Take 1-2 tablets by mouth as needed      indomethacin (INDOCIN) 50 MG capsule Take 1 capsule (50 mg) by mouth 2 times daily (with meals)     Leuprolide Acetate, 4 Month, (LUPRON DEPOT, 4-MONTH, IM) Inject 45 mg into the muscle      lisinopril (ZESTRIL) 30 MG tablet Take 1 tablet (30 mg) by mouth daily     Multiple Vitamin (DAILY MULTIVITAMIN PO) Take 1 tablet by mouth daily.     ORDER FOR DME, SET TO FAX, PATIENT WAS SET UP ON A [employer] 560 AUTO MACHINE AT [employer] WITH HEATED HUMIDITY. PATIENT HAD A CHOICE OF MASK AND CHOSE THE AIRFIT P10 SIZE MEDIUM NASAL PILLOW. HE HAS A F/U APPOINTMENT TO HIS SLEEP STUDY 2/5 WITH TURNER GLASER PA-C AND A SECOND ONE SCHEDULED IN 6 WEEKS.     UNABLE TO FIND MEDICATION NAME: Turkey tail supplement     No current facility-administered medications for this visit.         ALLERGIES     Allergies   Allergen Reactions     Chlorthalidone         REVIEW OF SYSTEMS   As above in the HPI, o/w complete 12-point ROS was negative.     PHYSICAL EXAM   BP (!) 168/84 (BP Location: Left arm, Patient Position: Sitting)   Pulse 65   Temp 98.6  F (37  C) (Oral)   Ht 1.727 m (5' 8\")   Wt 110.1 kg (242 lb 11.2 oz)   SpO2 97%   BMI 36.90 kg/m      SpO2 Readings from Last 4 Encounters:   06/18/18 96%   10/30/17 97%   10/16/17 97%   06/19/17 96%     Wt Readings from Last 3 Encounters:   07/07/22 110.1 kg (242 lb 11.2 oz)   01/06/22 110.2 kg (243 lb)   11/08/21 108.4 kg (239 lb)     GEN: " NAD  HEENT: PERRL, EOMI, no icterus, injection or pallor. Oropharynx is clear.  NECK: no cervical or supraclavicular lymphadenopathy  LUNGS: clear bilaterally  CV: regular, no murmurs, rubs, or gallops  ABDOMEN: soft, non-tender, non-distended, normal bowel sounds, no hepatosplenomegaly by percussion or palpation  EXT: warm, well perfused, no edema  NEURO: alert  SKIN: lesion in subxiphoid (epigastrium) as below     LABORATORY AND IMAGING STUDIES     Recent Labs   Lab Test 06/30/22  0851 01/03/22  0951 11/02/21  1342 07/12/21  0841 01/18/21  0757    143 136 137 141   POTASSIUM 4.7 4.3 4.2 4.1 4.1   CHLORIDE 109 110* 101 104 107   CO2 26 29 30 30 28   ANIONGAP 4 4 5 3 6   BUN 21 11 30 12 15   CR 1.01 0.85 1.33* 1.01 0.89   * 120* 116* 118* 128*   KERRY 9.4 8.9 11.0* 9.6 9.1     No results for input(s): MAG, PHOS in the last 91390 hours.  Recent Labs   Lab Test 06/30/22  0851 01/03/22  0951 11/02/21  1342 07/12/21  0841 01/18/21  0757   WBC 7.3 7.0 6.8 6.0 6.4   HGB 12.8* 13.2* 13.2* 14.3 13.6    256 264 228 207   MCV 98 100 98 96 95   NEUTROPHIL 56 54 55 51 42.5     Recent Labs   Lab Test 06/30/22  0851 01/03/22  0951 11/02/21  1342   BILITOTAL 0.3 0.6 0.9   ALKPHOS 53 56 61   ALT 43 88* 122*   AST 35 72* 100*   ALBUMIN 3.5 3.6 3.8    206 228*     TSH   Date Value Ref Range Status   03/14/2005 2.22 0.4 - 5.0 mU/L Final     No results for input(s): CEA in the last 11340 hours.  Results for orders placed or performed during the hospital encounter of 11/04/21   MRA Brain ([facility]) wo Contrast    Narrative    MR ANGIOGRAM OF THE HEAD WITHOUT CONTRAST   11/4/2021 9:18 AM     HISTORY: Transient neurological symptoms. Prostate cancer.    TECHNIQUE:  3D time-of-flight MR angiogram of the head without  contrast.    COMPARISON: None.    FINDINGS: The major intracranial arteries including the proximal  branches of the anterior cerebral, middle cerebral, and posterior  cerebral arteries appear  patent without vascular cutoff. No aneurysm  identified. No significant stenosis.      Impression    IMPRESSION: Normal MR angiogram of the head.        TEJAL ARIZMENDI MD         SYSTEM ID:  [id number]     Recent Labs   Lab Test 06/30/22  0851 01/03/22  0951 11/02/21  1342 07/12/21  0841 01/18/21  0757 07/30/20  0733   PSA 0.86 0.17 0.33 6.79* 0.38 8.23*   TESTOSTTOTAL 638 10*  --  [phone]       ASSESSMENT AND PLAN   1. Prostate cancer with metastasis to retroperitoneal nodes with persistent PSA elevation post prostatectomy; ish 4+3 disease, tertiary score of 5  2. Elevated LFT's, obesity, elevated blood pressure at this visit  3. ECOG PS 0  4. No other medical comorbiditiies    Boaz is alone at this clinic visit.  He has been doing very well since the last visit.  He has no physical complaints at this visit.    I have reviewed all of the labs done prior to this clinic visit.  Labs are all completely normal including electrolytes, renal function, complete blood count and differential except for mild anemia and his hepatic panel which had revealed persistent elevation in his transaminases has resolved at this visit but he has elevated GGT.     He is aware of his PSA values.  His PSA is still low at 0.86 ng/ml.  His PSA had rapidly rebounded after the last 2 doses of Lupron.  We had short break periods.  He got a 6-month dose of Lupron this time as opposed to 4-month dose on the previous occasions.  He has also started taking a new herbal supplement which he feels could be helping.  He has read about this new herbal supplement and he notes that it has been tested for breast cancer.  We would not do well too much on single value of PSA.  There can be variation in the rate of progression of disease or a combination of all the above factors.  Irrespective of the cause for relatively low PSA it is nice to see that his PSA has stayed low despite recovery of his testosterone.  He is in fact feeling a lot better with his  testosterone recovery.    I again brought up his elevated liver function tests which are concerning for chronic inflammation of the liver likely from hepatic steatosis. I have again encouraged him to lose weight as this should help both of his elevated LFTs and blood pressure.  He does understand this.  He is going to make concerted efforts to lose weight.  He notes that he is going to work with his primary care physician to lose weight.    I will again see him in 6 months with labs a week prior to visit including CBC, CMP, PSA, testosterone, GGT.    25 minutes spent on the date of the encounter doing chart review, history and exam, documentation and further activities as noted above     Pavel Norton  ,  Division of Hematology, Oncology & Transplantation  [facility].         Again, thank you for allowing me to participate in the care of your patient.        Sincerely,        Pavel Nroton MD

## 2022-07-07 NOTE — NURSING NOTE
"Oncology Rooming Note    July 7, 2022 8:14 AM   Boaz Acosta is a 71 year old male who presents for:    Chief Complaint   Patient presents with     Oncology Clinic Visit     Initial Vitals: BP (!) 168/84 (BP Location: Left arm, Patient Position: Sitting)   Pulse 65   Temp 98.6  F (37  C) (Oral)   Ht 1.727 m (5' 8\")   Wt 110.1 kg (242 lb 11.2 oz)   SpO2 97%   BMI 36.90 kg/m   Estimated body mass index is 36.9 kg/m  as calculated from the following:    Height as of this encounter: 1.727 m (5' 8\").    Weight as of this encounter: 110.1 kg (242 lb 11.2 oz). Body surface area is 2.3 meters squared.  No Pain (0) Comment: Data Unavailable   No LMP for male patient.  Allergies reviewed: Yes  Medications reviewed: Yes    Medications: Medication refills not needed today.  Pharmacy name entered into [employer]: [id number] [place] [facility] [id number] [facility]        Rachel Mata LPN              "

## 2022-07-07 NOTE — PROGRESS NOTES
[facility]  FOLLOW-UP VISIT NOTE    PATIENT NAME: Boaz Acosta MRN # [id number]  DATE OF VISIT: Jul 7, 2022 YOB: 1950    REFERRING PROVIDER: Rocío Boss MD  [address] [address]    CANCER TYPE: Prostate adenocarcinoma  STAGE: IV  ECOG PS: 0    TREATMENT SUMMARY:  Boaz's annual screening PSA was noted to be elevated at 17 in 2012, and 21 in the following year and it was attributed to prostatitis. Eventually, he did change his primary care physician and his repeat PSA was noted to be elevated at 31 in 04/2014. He was then referred to Urology, and was seen by Dr. Pressley with Urology in 07/2014. He was worked up with a biopsy of the prostate on 08/01//2014, which revealed adenocarcinoma of the prostate with Willis score of 4+3 involving 12 of the 12 cores. He was staged with a PET/CT scan, which did not show any evidence of distant metastasis. There was evidence of increased FDG uptake throughout the prostate with a maximum SUV of 7.1. He had a bone scan done on 08/19/2014, which did not show any obvious evidence of metastasis. Mr. Acosta underwent a robotic-assisted radical prostatectomy and lymph node sampling performed by Dr. Hesham Prieto on 10/24/2014. The pathology from this revealed adenocarcinoma of the prostate with a Willis of 7 and a tertiary pattern of 5. There was extensive prostatic tissue involvement with over 75% of the tissue with evidence of disease. There was extensive extraprostatic extension involving the apex, right and left anterior and posterior base, and soft tissue around both seminal vesicles. There was bilateral seminal vesicle invasion with involvement of vas deferens. Margins were involved with bladder neck and bilateral posterior seminal vesicle soft tissue apex. There was evidence of perineural invasion. The lymph node sample was negative for disease. His postoperative PSA  in 12/2014 remained elevated at 7.1, and all the subsequent PSAs have remained positive. His restaging scans done in January and again in 03/2015, including a CT abdomen and pelvis and bone scans, have been negative. He was referred to Radiation Oncology for consideration of salvage radiation therapy, and was seen by Dr. Murphy on 03/24/2015. Dr. Murphy referred him to Dr. Zbigniew Chu at the [facility] to have a choline-11 PET/CT scan done. The choline-11 scan done at [place] demonstrated evidence of 2 retroperitoneal lymph nodes that are mildly positive for uptake. There was no abnormal uptake in the prostate bed to suggest local recurrence. He was started on bicalutamide, and received his first dose of Lupron on 05/12/2015.   He comes for a scheduled follow up visit     5/12/2015 9/2/2015 1/4/2016 6/20/2019 7/30/2020 7/15/2021   leuprolide  IM 30 mg 30 mg 30 mg 30 mg 30 mg 45 mg     CURRENT TREATMENT  Break period of intermittent androgen deprivation therapy    SUBJECTIVE   Boaz comes for a scheduled follow up visit on androgen ablation therapy.     Boaz was seen in person.  He has been in good health since last visit.  He denies any new physical complaints.  He had been flyfishing for 6 hours yesterday.  He has been trying to spend time outdoors.    He has been trying to lose weight but ends up losing 10 pounds and then regained side.  His primary care physician has offered to help him.    He is aware of his labs including his PSA. He is quite happy about his PSA.     He has been doing well and denies any other complains.      Wt Readings from Last 10 Encounters:   07/07/22 110.1 kg (242 lb 11.2 oz)   01/06/22 110.2 kg (243 lb)   11/08/21 108.4 kg (239 lb)   07/29/21 111.9 kg (246 lb 9.6 oz)   07/15/21 112.9 kg (249 lb)   07/15/21 112.9 kg (249 lb)   04/21/21 113.4 kg (250 lb)   01/21/21 116.8 kg (257 lb 9.6 oz)   07/30/20 112.1 kg (247 lb 1.6 oz)   06/07/20 112.1 kg (247 lb 3.2 oz)       PAST MEDICAL HISTORY  "  1. Prostate cancer with disease metastatic to para-aortic nodes   2. Borderline HTN  3. ADALID an CPAP at night  4. GERD      CURRENT OUTPATIENT MEDICATIONS     Current Outpatient Medications   Medication Sig     ascorbic acid (VITAMIN C) 1000 MG TABS Take 1-2 tablets by mouth as needed      indomethacin (INDOCIN) 50 MG capsule Take 1 capsule (50 mg) by mouth 2 times daily (with meals)     Leuprolide Acetate, 4 Month, (LUPRON DEPOT, 4-MONTH, IM) Inject 45 mg into the muscle      lisinopril (ZESTRIL) 30 MG tablet Take 1 tablet (30 mg) by mouth daily     Multiple Vitamin (DAILY MULTIVITAMIN PO) Take 1 tablet by mouth daily.     ORDER FOR DME, SET TO FAX, PATIENT WAS SET UP ON A RESPIRONICS 560 AUTO MACHINE AT [employer] WITH HEATED HUMIDITY. PATIENT HAD A CHOICE OF MASK AND CHOSE THE AIRFIT P10 SIZE MEDIUM NASAL PILLOW. HE HAS A F/U APPOINTMENT TO HIS SLEEP STUDY 2/5 WITH TURNER GLASER PA-C AND A SECOND ONE SCHEDULED IN 6 WEEKS.     UNABLE TO FIND MEDICATION NAME: Turkey tail supplement     No current facility-administered medications for this visit.         ALLERGIES     Allergies   Allergen Reactions     Chlorthalidone         REVIEW OF SYSTEMS   As above in the HPI, o/w complete 12-point ROS was negative.     PHYSICAL EXAM   BP (!) 168/84 (BP Location: Left arm, Patient Position: Sitting)   Pulse 65   Temp 98.6  F (37  C) (Oral)   Ht 1.727 m (5' 8\")   Wt 110.1 kg (242 lb 11.2 oz)   SpO2 97%   BMI 36.90 kg/m      SpO2 Readings from Last 4 Encounters:   06/18/18 96%   10/30/17 97%   10/16/17 97%   06/19/17 96%     Wt Readings from Last 3 Encounters:   07/07/22 110.1 kg (242 lb 11.2 oz)   01/06/22 110.2 kg (243 lb)   11/08/21 108.4 kg (239 lb)     GEN: NAD  HEENT: PERRL, EOMI, no icterus, injection or pallor. Oropharynx is clear.  NECK: no cervical or supraclavicular lymphadenopathy  LUNGS: clear bilaterally  CV: regular, no murmurs, rubs, or gallops  ABDOMEN: soft, non-tender, non-distended, normal bowel " sounds, no hepatosplenomegaly by percussion or palpation  EXT: warm, well perfused, no edema  NEURO: alert  SKIN: lesion in subxiphoid (epigastrium) as below     LABORATORY AND IMAGING STUDIES     Recent Labs   Lab Test 06/30/22  0851 01/03/22  0951 11/02/21  1342 07/12/21  0841 01/18/21  0757    143 136 137 141   POTASSIUM 4.7 4.3 4.2 4.1 4.1   CHLORIDE 109 110* 101 104 107   CO2 26 29 30 30 28   ANIONGAP 4 4 5 3 6   BUN 21 11 30 12 15   CR 1.01 0.85 1.33* 1.01 0.89   * 120* 116* 118* 128*   KERRY 9.4 8.9 11.0* 9.6 9.1     No results for input(s): MAG, PHOS in the last 22916 hours.  Recent Labs   Lab Test 06/30/22  0851 01/03/22  0951 11/02/21  1342 07/12/21  0841 01/18/21  0757   WBC 7.3 7.0 6.8 6.0 6.4   HGB 12.8* 13.2* 13.2* 14.3 13.6    256 264 228 207   MCV 98 100 98 96 95   NEUTROPHIL 56 54 55 51 42.5     Recent Labs   Lab Test 06/30/22  0851 01/03/22  0951 11/02/21  1342   BILITOTAL 0.3 0.6 0.9   ALKPHOS 53 56 61   ALT 43 88* 122*   AST 35 72* 100*   ALBUMIN 3.5 3.6 3.8    206 228*     TSH   Date Value Ref Range Status   03/14/2005 2.22 0.4 - 5.0 mU/L Final     No results for input(s): CEA in the last 49736 hours.  Results for orders placed or performed during the hospital encounter of 11/04/21   MRA Brain ([place]) wo Contrast    Narrative    MR ANGIOGRAM OF THE HEAD WITHOUT CONTRAST   11/4/2021 9:18 AM     HISTORY: Transient neurological symptoms. Prostate cancer.    TECHNIQUE:  3D time-of-flight MR angiogram of the head without  contrast.    COMPARISON: None.    FINDINGS: The major intracranial arteries including the proximal  branches of the anterior cerebral, middle cerebral, and posterior  cerebral arteries appear patent without vascular cutoff. No aneurysm  identified. No significant stenosis.      Impression    IMPRESSION: Normal MR angiogram of the head.        TEJAL ARIZMENDI MD         SYSTEM ID:  [id number]     Recent Labs   Lab Test 06/30/22  0851 01/03/22  0918  11/02/21  1342 07/12/21  0841 01/18/21  0757 07/30/20  0733   PSA 0.86 0.17 0.33 6.79* 0.38 8.23*   TESTOSTTOTAL 638 10*  --  595 671 582       ASSESSMENT AND PLAN   1. Prostate cancer with metastasis to retroperitoneal nodes with persistent PSA elevation post prostatectomy; ish 4+3 disease, tertiary score of 5  2. Elevated LFT's, obesity, elevated blood pressure at this visit  3. ECOG PS 0  4. No other medical comorbiditiies    Boaz is alone at this clinic visit.  He has been doing very well since the last visit.  He has no physical complaints at this visit.    I have reviewed all of the labs done prior to this clinic visit.  Labs are all completely normal including electrolytes, renal function, complete blood count and differential except for mild anemia and his hepatic panel which had revealed persistent elevation in his transaminases has resolved at this visit but he has elevated GGT.     He is aware of his PSA values.  His PSA is still low at 0.86 ng/ml.  His PSA had rapidly rebounded after the last 2 doses of Lupron.  We had short break periods.  He got a 6-month dose of Lupron this time as opposed to 4-month dose on the previous occasions.  He has also started taking a new herbal supplement which he feels could be helping.  He has read about this new herbal supplement and he notes that it has been tested for breast cancer.  We would not do well too much on single value of PSA.  There can be variation in the rate of progression of disease or a combination of all the above factors.  Irrespective of the cause for relatively low PSA it is nice to see that his PSA has stayed low despite recovery of his testosterone.  He is in fact feeling a lot better with his testosterone recovery.    I again brought up his elevated liver function tests which are concerning for chronic inflammation of the liver likely from hepatic steatosis. I have again encouraged him to lose weight as this should help both of his elevated  LFTs and blood pressure.  He does understand this.  He is going to make concerted efforts to lose weight.  He notes that he is going to work with his primary care physician to lose weight.    I will again see him in 6 months with labs a week prior to visit including CBC, CMP, PSA, testosterone, GGT.    25 minutes spent on the date of the encounter doing chart review, history and exam, documentation and further activities as noted above     Pavel Norton  ,  Division of Hematology, Oncology & Transplantation  [facility].

## 2022-09-02 DIAGNOSIS — I10 UNCONTROLLED HYPERTENSION: ICD-10-CM

## 2022-09-05 ENCOUNTER — TELEPHONE (OUTPATIENT)
Dept: FAMILY MEDICINE | Facility: CLINIC | Age: 72
End: 2022-09-05

## 2022-09-05 RX ORDER — LISINOPRIL 30 MG/1
TABLET ORAL
Qty: 90 TABLET | Refills: 1 | Status: SHIPPED | OUTPATIENT
Start: 2022-09-05 | End: 2023-06-15

## 2022-09-05 NOTE — TELEPHONE ENCOUNTER
Routing refill request to provider for review/approval because:  BP Readings from Last 3 Encounters:   07/07/22 (!) 168/84   01/06/22 (!) 185/93   11/08/21 (!) 155/111   Pt was due in May for recheck

## 2022-10-07 ENCOUNTER — MYC MEDICAL ADVICE (OUTPATIENT)
Dept: FAMILY MEDICINE | Facility: CLINIC | Age: 72
End: 2022-10-07

## 2022-10-10 ENCOUNTER — OFFICE VISIT (OUTPATIENT)
Dept: URGENT CARE | Facility: URGENT CARE | Age: 72
End: 2022-10-10
Payer: COMMERCIAL

## 2022-10-10 VITALS
HEART RATE: 65 BPM | BODY MASS INDEX: 37.31 KG/M2 | TEMPERATURE: 98.1 F | HEIGHT: 68 IN | SYSTOLIC BLOOD PRESSURE: 142 MMHG | DIASTOLIC BLOOD PRESSURE: 80 MMHG | OXYGEN SATURATION: 98 % | WEIGHT: 246.2 LBS

## 2022-10-10 DIAGNOSIS — M10.9 ACUTE GOUT OF LEFT FOOT, UNSPECIFIED CAUSE: Primary | ICD-10-CM

## 2022-10-10 DIAGNOSIS — I10 HYPERTENSION GOAL BP (BLOOD PRESSURE) < 140/90: Chronic | ICD-10-CM

## 2022-10-10 PROCEDURE — 99214 OFFICE O/P EST MOD 30 MIN: CPT | Performed by: EMERGENCY MEDICINE

## 2022-10-10 RX ORDER — PREDNISONE 20 MG/1
TABLET ORAL
Qty: 20 TABLET | Refills: 0 | Status: SHIPPED | OUTPATIENT
Start: 2022-10-10 | End: 2023-01-05

## 2022-10-10 NOTE — PROGRESS NOTES
Assessment & Plan     Diagnosis:    (M10.9) Acute gout of left foot, unspecified cause  (primary encounter diagnosis)  Plan: predniSONE (DELTASONE) 20 MG tablet    (I10) Hypertension goal BP (blood pressure) < 140/90      Medical Decision Making:  Boaz Acosta is a 72 year old male who presents for evaluation of painful joint in the left foot.  The history, physical exam and supporting data are consistent with gout.  There is a long history of the same and pt admits this is what it feels like.  No fevers, chills, or night sweats, no joint migration, no tracking or concern for cellulitis.  I discussed the possibility of septic joint and the need for joint aspiration for a clear diagnosis; do not feel this is indicated today; he understands reasons to go to the ER immediately to rule this out.  There do not appear at this time to be any complications including necrotizing fascitis, lymphangitis, lymphadenitis, abscess, osteomyelitis, sepsis, or shock. Supportive outpatient management is indicated; prednisone taper prescribed given good relief in the past with no absolute contraindications here.  He has already taken indomethacin without relief; is on allopurinol.  Close follow-up of primary care physician to ensure no progression and rapid resolution.      Patient voices understanding and agreement with the plan including reasons to go to the ER immediately as well as to be seen by a more consistent care-giver, such as their PCP, if the symptoms persist more than 2 days.     Randell Pavon PA-C  [facility]    Subjective     Boaz Acosta is a 72 year old male who presents to clinic today for the following health issues:  Chief Complaint   Patient presents with     Arthritis     Gout in left foot.        HPI    Gout; Left Foot Pain    Patient states over the last couple of days he has noticed increased swelling and pain in his left foot, states this is very consistent with history of gout and he  "has had multiple flare-ups.  He does note that he has been eating more red meat recently, had a flu and COVID shot last week, believes this is the cause of his flare up currently.  He has been taking indomethacin, is already on allopurinol but notes that does not seem to be improving quickly.  He notes he has had prednisone in the past with good relief.  He denies any redness, red streaks going up the foot or ankle, fevers, sensation changes to the foot, history of diabetes, any chest pain, shortness of breath or other concerns.    Review of Systems    See HPI    Objective      Vitals: BP (!) 142/80 (BP Location: Left arm, Patient Position: Sitting, Cuff Size: Adult Large)   Pulse 65   Temp 98.1  F (36.7  C) (Tympanic)   Ht 1.727 m (5' 8\")   Wt 111.7 kg (246 lb 3.2 oz)   SpO2 98%   BMI 37.43 kg/m        Patient Vitals for the past 24 hrs:   BP Temp Temp src Pulse SpO2 Height Weight   10/10/22 1609 (!) 142/80 -- -- -- -- -- --   10/10/22 1537 (!) 187/98 98.1  F (36.7  C) Tympanic 65 98 % 1.727 m (5' 8\") 111.7 kg (246 lb 3.2 oz)       Vital signs reviewed by: Randell Pavon PA-C    Physical Exam   Constitutional: Patient is alert and cooperative. Mild acute distress.  Cardiovascular: Regular rate and rhythm  Pulmonary/Chest: Lungs are clear to auscultation throughout. Effort normal. No respiratory distress. No wheezes, rales or rhonchi..  Neurological: Alert and oriented x3. Strength and sensation are intact and symmetric in the bilateral lower extremities.   MSK: Left foot is swollen, tender to palpation in the mid dorsum of the foot with no erythema, warmth, fluctuance or areas of pointing.  No lymphangitis.  Normal range of motion of the ankle.  Compartments are soft throughout the leg.  Skin: No rash noted on visualized skin.  Psychiatric:The patient has a normal mood and affect.         Randell Pavon PA-C, October 10, 2022      "

## 2022-10-17 ENCOUNTER — MYC MEDICAL ADVICE (OUTPATIENT)
Dept: FAMILY MEDICINE | Facility: CLINIC | Age: 72
End: 2022-10-17

## 2022-10-18 ENCOUNTER — OFFICE VISIT (OUTPATIENT)
Dept: URGENT CARE | Facility: URGENT CARE | Age: 72
End: 2022-10-18
Payer: COMMERCIAL

## 2022-10-18 VITALS
TEMPERATURE: 98.1 F | WEIGHT: 250.2 LBS | OXYGEN SATURATION: 97 % | DIASTOLIC BLOOD PRESSURE: 93 MMHG | HEIGHT: 68 IN | BODY MASS INDEX: 37.92 KG/M2 | SYSTOLIC BLOOD PRESSURE: 186 MMHG | HEART RATE: 63 BPM

## 2022-10-18 DIAGNOSIS — C61 PROSTATE CANCER (H): ICD-10-CM

## 2022-10-18 DIAGNOSIS — M1A.9XX1 GOUTY ARTHROPATHY WITH TOPHI: Primary | ICD-10-CM

## 2022-10-18 DIAGNOSIS — M79.674 PAIN OF TOE OF RIGHT FOOT: ICD-10-CM

## 2022-10-18 PROCEDURE — 99214 OFFICE O/P EST MOD 30 MIN: CPT | Performed by: PHYSICIAN ASSISTANT

## 2022-10-18 RX ORDER — COLCHICINE 0.6 MG/1
TABLET ORAL
Qty: 15 TABLET | Refills: 0 | Status: SHIPPED | OUTPATIENT
Start: 2022-10-18 | End: 2023-01-05

## 2022-10-18 NOTE — TELEPHONE ENCOUNTER
Please see [employer] message regarding gout.     Patient seen 10/10/22 at  and started on prednisone taper (On day 9) (started at 60mg now on 20mg)    Gout to left foot improved. Right foot no improvement. Pictures attached.     Currently taking indomethacin; allopurinol dc'd 6 months ago.     Please advise.     Ese Marie RN  [facility]

## 2022-10-18 NOTE — PROGRESS NOTES
"  Chief Complaint   Patient presents with     Arthritis     Gout in right foot.              ASSESSMENT:      ICD-10-CM    1. Gouty arthropathy with tophi  M1A.9XX1 colchicine (COLCYRS) 0.6 MG tablet     Orthopedic  Referral      2. Pain of toe of right foot  M79.674 colchicine (COLCYRS) 0.6 MG tablet     Orthopedic  Referral      3. Prostate cancer (H)  C61                  PLAN: 72-year old presenting with couple of days of 2nd right toe with erythema, warm, and tophi. Has hx of gout to the affected leg in the past.   -Prescribed colchicine for symptom management  -Referral made for podiatry within 3-5 days for further evaluation and treatment.  -Advised to soak affected foot in warm water for comfort measure.       Ty Rodarte        SUBJECTIVE:  Boaz Acosta is an 72 year old male who presents with couple of days of increased redness, warmth and white formation on 2nd right toe. Has hx of gout in the past. States he had developed gout on the left foot couple of weeks ago and was treated with oral prednisone with effective result. This new symptom on right toe developed 2 days after initiating prednisone for the left foot.   Cyclic treatment for prostate CA    Past Medical History:   Diagnosis Date     Elevated prostate specific antigen (PSA) 5/30/2014     History of blood transfusion      Hypertension several years    mainly with mechanical testing     Idiopathic chronic 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 9/25/2019     Lyme disease     2 times     Prostate cancer (H) 10/24/2014    Prostate resection     SCC (squamous cell carcinoma), face 12/12/2012     History   Smoking Status     Former     Types: Cigars   Smokeless Tobacco     Never       ROS:  GEN no fevers  SKIN no erythema  Musculoskeletal:  See HPI.      OBJECTIVE:  Blood pressure (!) 186/93, pulse 63, temperature 98.1  F (36.7  C), temperature source Tympanic, height 1.727 m (5' 8\"), weight 113.5 kg (250 lb 3.2 oz), SpO2 97 %.  Patient " is alert and NAD.  EYES: conjunctiva clear  Ankle/foot Exam (right):  Inspection:right second toe with erythema, warmth, tophi, edematous   Palpation:non-tender throughout  Cap refill intact.    Good doralis pedis.  Neurovascularly Intact Distally.         Patient was additionally assessed by Ty Rodarte NP-S.     I independently interviewed and examined the patient. I reviewed and agree with above.     Rayna Samuel PA-C

## 2022-10-18 NOTE — TELEPHONE ENCOUNTER
Spoke with patient. Relayed provider's message as written. Patient verbalized understanding and has no further questions at this time. Patient plans to go to Urgent Care in [place] this morning.     Rosa Mota RN  [facility]

## 2022-10-18 NOTE — TELEPHONE ENCOUNTER
From the photo, it looks like there is a gout tophus present.  It also looks like the toe may be infected, which might explain why it's not responding to prednisone.  I recommend going to urgent care today to get this assessed in-person.    Gianluca Singh MD

## 2022-10-20 ENCOUNTER — OFFICE VISIT (OUTPATIENT)
Dept: PODIATRY | Facility: CLINIC | Age: 72
End: 2022-10-20
Payer: COMMERCIAL

## 2022-10-20 DIAGNOSIS — M10.9 ACUTE GOUT OF RIGHT ANKLE, UNSPECIFIED CAUSE: ICD-10-CM

## 2022-10-20 DIAGNOSIS — M79.674 PAIN OF TOE OF RIGHT FOOT: ICD-10-CM

## 2022-10-20 DIAGNOSIS — M1A.9XX1 GOUTY ARTHROPATHY WITH TOPHI: ICD-10-CM

## 2022-10-20 PROCEDURE — 99203 OFFICE O/P NEW LOW 30 MIN: CPT | Performed by: PODIATRIST

## 2022-10-20 RX ORDER — INDOMETHACIN 50 MG/1
50 CAPSULE ORAL 2 TIMES DAILY WITH MEALS
Qty: 60 CAPSULE | Refills: 1 | Status: SHIPPED | OUTPATIENT
Start: 2022-10-20 | End: 2023-12-14

## 2022-10-20 NOTE — PROGRESS NOTES
Past Medical History:   Diagnosis Date     Elevated prostate specific antigen (PSA) 5/30/2014     History of blood transfusion      Hypertension several years    mainly with mechanical testing     Idiopathic chronic 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 9/25/2019     Lyme disease     2 times     Prostate cancer (H) 10/24/2014    Prostate resection     SCC (squamous cell carcinoma), face 12/12/2012     Patient Active Problem List   Diagnosis     History of colonic polyps     Other urinary problems     CARDIOVASCULAR SCREENING; LDL GOAL LESS THAN 130     Hypertension goal BP (blood pressure) < 140/90     Prostate cancer (H)     Obesity     ADALID (obstructive sleep apnea)- severe (AHI 58)     Anaphylactic reaction     Obesity (BMI 35.0-39.9) with comorbidity (H)     Idiopathic chronic 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     Transient neurological symptoms     Other transient cerebral ischemic attacks and related syndromes      Vision loss, central, unspecified laterality     Past Surgical History:   Procedure Laterality Date     COLONOSCOPY      on record     DAVINCI LYMPHADENECTOMY Bilateral 10/24/2014    Procedure: DAVINCI LYMPHADENECTOMY;  Surgeon: Hesham Prieto MD;  Location: [facility] OR     DAVINCI PROSTATECTOMY N/A 10/24/2014    Procedure: DAVINCI PROSTATECTOMY;  Surgeon: Hesham Prieto MD;  Location: [facility] OR     HERNIA REPAIR      umblical 2007     MOHS MICROGRAPHIC PROCEDURE       ORTHOPEDIC SURGERY Right     right femur repair     ORTHOPEDIC SURGERY Right     hardware removed right femur     ORTHOPEDIC SURGERY Left     tendon bicep repair     Social History     Socioeconomic History     Marital status:      Spouse name: Not on file     Number of children: Not on file     Years of education: Not on file     Highest education level: Not on file   Occupational History     Employer: UNEMPLOYED   Tobacco Use     Smoking status: Former     Types: Cigars     Smokeless tobacco: Never     Tobacco  comments:     Only occassional cigars   Vaping Use     Vaping Use: Never used   Substance and Sexual Activity     Alcohol use: Yes     Comment: 1-2 beers or a glass of wine per day     Drug use: No     Sexual activity: Not Currently   Other Topics Concern     Parent/sibling w/ CABG, MI or angioplasty before 65F 55M? No   Social History Narrative     Not on file     Social Determinants of Health     Financial Resource Strain: Not on file   Food Insecurity: Not on file   Transportation Needs: Not on file   Physical Activity: Not on file   Stress: Not on file   Social Connections: Not on file   Intimate Partner Violence: Not on file   Housing Stability: Not on file     Family History   Problem Relation Age of Onset     Psychotic Disorder Son      C.A.D. Father      Coronary Artery Disease Father         ac bypass and other     Hypertension Father      Breast Cancer Sister      Breast Cancer Sister      Cerebrovascular Disease No family hx of      Melanoma No family hx of      Skin Cancer No family hx of      Uric Acid   Date Value Ref Range Status   11/02/2021 11.6 (H) 3.5 - 7.2 mg/dL Final   08/06/2019 5.0 3.5 - 7.2 mg/dL Final     SUBJECTIVE FINDINGS:  A 72-year-old presents from urgent care for gout.  He relates that he got a COVID booster and a flu shot, and after that his left foot blew up with gout across the MPJs.  He was put on prednisone.  He has a few doses of those left and that has helped.  That was about 2 weeks ago.  Then, a few days ago, the first MPJ got sore and then he relates about 4 days after the original symptoms started, the right second toe got swollen and red.  He had pictures of that.  He relates no injuries.  He was seen in urgent care.  He relates there has been no drainage, no injury.  He relates he is taking the colchicine and prednisone.  He also takes ibuprofen as needed.  He relates he has indomethacin, which he takes at night as needed, but he has not been taking that for a while.  He  relates he gets injections for his prostate cancer.  It seems that he sometimes gets gout flares after this.  He relates he is not on allopurinol.  I did review the urgent care 10/02/2022 note and previous oncology note as noted in the EMR.    OBJECTIVE FINDINGS:  Vascular status intact.  DP and PT are 2/4 in the right.  On the left foot, there is no erythema, no edema.  On the right foot, he has functional hallux limitus with dorsal medial 1st MPJ prominence and dorsally contracted digits.  He has a right second toe IPJ subcutaneous tophi.  There is erythema and edema.  This is decreased from his pictures.  There is pain on palpation.  There is no odor, no calor, no active drainage.    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  Diagnosis and treatment options discussed with the patient.  Prescription for Voltaren gel given and use discussed with him.  Prescription for indomethacin given and use discussed with him.  I advised he follow up with his primary care to see if they can put him on allopurinol.  Continue the colchicine and prednisone as directed.  I discussed with him draining and cleaning this out.  I advised we only do that if we need to.  So, we will hold off on that today.  Return to clinic and see me as needed.  Previous notes reviewed.

## 2022-10-20 NOTE — LETTER
10/20/2022         RE: Boaz Acosta  [address] [address]        Dear Colleague,    Thank you for referring your patient, Boaz Acosta, to the [facility]. Please see a copy of my visit note below.    Past Medical History:   Diagnosis Date     Elevated prostate specific antigen (PSA) 5/30/2014     History of blood transfusion      Hypertension several years    mainly with mechanical testing     Idiopathic chronic 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 9/25/2019     Lyme disease     2 times     Prostate cancer (H) 10/24/2014    Prostate resection     SCC (squamous cell carcinoma), face 12/12/2012     Patient Active Problem List   Diagnosis     History of colonic polyps     Other urinary problems     CARDIOVASCULAR SCREENING; LDL GOAL LESS THAN 130     Hypertension goal BP (blood pressure) < 140/90     Prostate cancer (H)     Obesity     ADALID (obstructive sleep apnea)- severe (AHI 58)     Anaphylactic reaction     Obesity (BMI 35.0-39.9) with comorbidity (H)     Idiopathic chronic 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     Transient neurological symptoms     Other transient cerebral ischemic attacks and related syndromes      Vision loss, central, unspecified laterality     Past Surgical History:   Procedure Laterality Date     COLONOSCOPY      on record     DAVINCI LYMPHADENECTOMY Bilateral 10/24/2014    Procedure: DAVINCI LYMPHADENECTOMY;  Surgeon: Hesham Prieto MD;  Location: [facility] OR     DAVINCI PROSTATECTOMY N/A 10/24/2014    Procedure: DAVINCI PROSTATECTOMY;  Surgeon: Hesham Prieto MD;  Location: [facility] OR     HERNIA REPAIR      umblical 2007     MOHS MICROGRAPHIC PROCEDURE       ORTHOPEDIC SURGERY Right     right femur repair     ORTHOPEDIC SURGERY Right     hardware removed right femur     ORTHOPEDIC SURGERY Left     tendon bicep repair     Social History     Socioeconomic History     Marital status:      Spouse name: Not on file      Number of children: Not on file     Years of education: Not on file     Highest education level: Not on file   Occupational History     Employer: UNEMPLOYED   Tobacco Use     Smoking status: Former     Types: Cigars     Smokeless tobacco: Never     Tobacco comments:     Only occassional cigars   Vaping Use     Vaping Use: Never used   Substance and Sexual Activity     Alcohol use: Yes     Comment: 1-2 beers or a glass of wine per day     Drug use: No     Sexual activity: Not Currently   Other Topics Concern     Parent/sibling w/ CABG, MI or angioplasty before 65F 55M? No   Social History Narrative     Not on file     Social Determinants of Health     Financial Resource Strain: Not on file   Food Insecurity: Not on file   Transportation Needs: Not on file   Physical Activity: Not on file   Stress: Not on file   Social Connections: Not on file   Intimate Partner Violence: Not on file   Housing Stability: Not on file     Family History   Problem Relation Age of Onset     Psychotic Disorder Son      C.A.D. Father      Coronary Artery Disease Father         ac bypass and other     Hypertension Father      Breast Cancer Sister      Breast Cancer Sister      Cerebrovascular Disease No family hx of      Melanoma No family hx of      Skin Cancer No family hx of      Uric Acid   Date Value Ref Range Status   11/02/2021 11.6 (H) 3.5 - 7.2 mg/dL Final   08/06/2019 5.0 3.5 - 7.2 mg/dL Final     SUBJECTIVE FINDINGS:  A 72-year-old presents from urgent care for gout.  He relates that he got a COVID booster and a flu shot, and after that his left foot blew up with gout across the MPJs.  He was put on prednisone.  He has a few doses of those left and that has helped.  That was about 2 weeks ago.  Then, a few days ago, the first MPJ got sore and then he relates about 4 days after the original symptoms started, the right second toe got swollen and red.  He had pictures of that.  He relates no injuries.  He was seen in urgent care.  He  relates there has been no drainage, no injury.  He relates he is taking the colchicine and prednisone.  He also takes ibuprofen as needed.  He relates he has indomethacin, which he takes at night as needed, but he has not been taking that for a while.  He relates he gets injections for his prostate cancer.  It seems that he sometimes gets gout flares after this.  He relates he is not on allopurinol.  I did review the urgent care 10/02/2022 note and previous oncology note as noted in the EMR.    OBJECTIVE FINDINGS:  Vascular status intact.  DP and PT are 2/4 in the right.  On the left foot, there is no erythema, no edema.  On the right foot, he has functional hallux limitus with dorsal medial 1st MPJ prominence and dorsally contracted digits.  He has a right second toe IPJ subcutaneous tophi.  There is erythema and edema.  This is decreased from his pictures.  There is pain on palpation.  There is no odor, no calor, no active drainage.    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  Diagnosis and treatment options discussed with the patient.  Prescription for Voltaren gel given and use discussed with him.  Prescription for indomethacin given and use discussed with him.  I advised he follow up with his primary care to see if they can put him on allopurinol.  Continue the colchicine and prednisone as directed.  I discussed with him draining and cleaning this out.  I advised we only do that if we need to.  So, we will hold off on that today.  Return to clinic and see me as needed.  Previous notes reviewed.          Again, thank you for allowing me to participate in the care of your patient.        Sincerely,        Roel Nettles DPM

## 2022-10-20 NOTE — NURSING NOTE
Boaz Acosta's chief complaint for this visit includes:  Chief Complaint   Patient presents with     Consult     Right foot toe pain     PCP: Gianluca Powell    Referring Provider:  [place] [address]    There were no vitals taken for this visit.  Data Unavailable        Allergies   Allergen Reactions     Chlorthalidone          Do you need any medication refills at today's visit?

## 2022-10-28 ENCOUNTER — TELEPHONE (OUTPATIENT)
Dept: SLEEP MEDICINE | Facility: CLINIC | Age: 72
End: 2022-10-28

## 2022-10-28 DIAGNOSIS — G47.33 OSA (OBSTRUCTIVE SLEEP APNEA): Primary | ICD-10-CM

## 2022-10-28 NOTE — TELEPHONE ENCOUNTER
CALLED PT TO DISCUSS. PT STATED HIS MACHINE THAT IS USUALLY WHISPER QUIET STARTED MAKING A BUZZING NOISE LAST NIGHT AND WITHIN A FEW MINUTES IT WAS SO LOUD HE HAD TO TURN IT OFF. PT STATED HE HAS AN OLD MACHINE HE CAN USE UNTIL HE CAN COME IN FOR TROUBLESHOOT. TOLD HIM I WILL GRAB A LOANER JUST IN CASE PT FINDS THAT HIS OLD MACHINE DOESN'T WORK AS WELL AS HE THOUGHT IT WOULD. DIVYA ON 11/1/22 @ 11:30 FOR TROUBLESHOOT IN [facility].

## 2023-01-02 ENCOUNTER — LAB (OUTPATIENT)
Dept: LAB | Facility: CLINIC | Age: 73
End: 2023-01-02
Payer: COMMERCIAL

## 2023-01-02 DIAGNOSIS — C61 PROSTATE CANCER (H): ICD-10-CM

## 2023-01-02 DIAGNOSIS — C79.82 METASTATIC ADENOCARCINOMA TO PROSTATE (H): ICD-10-CM

## 2023-01-02 DIAGNOSIS — R79.89 ELEVATED LFTS: ICD-10-CM

## 2023-01-02 DIAGNOSIS — M10.9 ACUTE GOUT, UNSPECIFIED CAUSE, UNSPECIFIED SITE: ICD-10-CM

## 2023-01-02 DIAGNOSIS — E66.01 MORBID OBESITY (H): ICD-10-CM

## 2023-01-02 LAB
ALBUMIN SERPL-MCNC: 3.5 G/DL (ref 3.4–5)
ALP SERPL-CCNC: 57 U/L (ref 40–150)
ALT SERPL W P-5'-P-CCNC: 30 U/L (ref 0–70)
ANION GAP SERPL CALCULATED.3IONS-SCNC: 4 MMOL/L (ref 3–14)
AST SERPL W P-5'-P-CCNC: 24 U/L (ref 0–45)
BASOPHILS # BLD AUTO: 0.1 10E3/UL (ref 0–0.2)
BASOPHILS NFR BLD AUTO: 1 %
BILIRUB SERPL-MCNC: 0.5 MG/DL (ref 0.2–1.3)
BUN SERPL-MCNC: 14 MG/DL (ref 7–30)
CALCIUM SERPL-MCNC: 9.3 MG/DL (ref 8.5–10.1)
CHLORIDE BLD-SCNC: 109 MMOL/L (ref 94–109)
CO2 SERPL-SCNC: 27 MMOL/L (ref 20–32)
CREAT SERPL-MCNC: 0.89 MG/DL (ref 0.66–1.25)
EOSINOPHIL # BLD AUTO: 0.3 10E3/UL (ref 0–0.7)
EOSINOPHIL NFR BLD AUTO: 5 %
ERYTHROCYTE [DISTWIDTH] IN BLOOD BY AUTOMATED COUNT: 12.9 % (ref 10–15)
GFR SERPL CREATININE-BSD FRML MDRD: >90 ML/MIN/1.73M2
GGT SERPL-CCNC: 29 U/L (ref 0–75)
GLUCOSE BLD-MCNC: 102 MG/DL (ref 70–99)
HCT VFR BLD AUTO: 39.5 % (ref 40–53)
HGB BLD-MCNC: 12.8 G/DL (ref 13.3–17.7)
IMM GRANULOCYTES # BLD: 0 10E3/UL
IMM GRANULOCYTES NFR BLD: 0 %
LDH SERPL L TO P-CCNC: 154 U/L (ref 85–227)
LYMPHOCYTES # BLD AUTO: 2.5 10E3/UL (ref 0.8–5.3)
LYMPHOCYTES NFR BLD AUTO: 34 %
MCH RBC QN AUTO: 30.6 PG (ref 26.5–33)
MCHC RBC AUTO-ENTMCNC: 32.4 G/DL (ref 31.5–36.5)
MCV RBC AUTO: 95 FL (ref 78–100)
MONOCYTES # BLD AUTO: 0.9 10E3/UL (ref 0–1.3)
MONOCYTES NFR BLD AUTO: 12 %
NEUTROPHILS # BLD AUTO: 3.6 10E3/UL (ref 1.6–8.3)
NEUTROPHILS NFR BLD AUTO: 48 %
NRBC # BLD AUTO: 0 10E3/UL
NRBC BLD AUTO-RTO: 0 /100
PLATELET # BLD AUTO: 237 10E3/UL (ref 150–450)
POTASSIUM BLD-SCNC: 4.2 MMOL/L (ref 3.4–5.3)
PROT SERPL-MCNC: 7.5 G/DL (ref 6.8–8.8)
PSA SERPL-MCNC: 6.12 UG/L (ref 0–4)
RBC # BLD AUTO: 4.18 10E6/UL (ref 4.4–5.9)
SODIUM SERPL-SCNC: 140 MMOL/L (ref 133–144)
URATE SERPL-MCNC: 7.4 MG/DL (ref 3.5–7.2)
WBC # BLD AUTO: 7.4 10E3/UL (ref 4–11)

## 2023-01-02 PROCEDURE — 85025 COMPLETE CBC W/AUTO DIFF WBC: CPT

## 2023-01-02 PROCEDURE — 84550 ASSAY OF BLOOD/URIC ACID: CPT

## 2023-01-02 PROCEDURE — 82977 ASSAY OF GGT: CPT

## 2023-01-02 PROCEDURE — 84403 ASSAY OF TOTAL TESTOSTERONE: CPT

## 2023-01-02 PROCEDURE — 83615 LACTATE (LD) (LDH) ENZYME: CPT

## 2023-01-02 PROCEDURE — 36415 COLL VENOUS BLD VENIPUNCTURE: CPT

## 2023-01-02 PROCEDURE — 80053 COMPREHEN METABOLIC PANEL: CPT

## 2023-01-02 PROCEDURE — 84153 ASSAY OF PSA TOTAL: CPT

## 2023-01-04 LAB — TESTOST SERPL-MCNC: 578 NG/DL (ref 240–950)

## 2023-01-05 ENCOUNTER — VIRTUAL VISIT (OUTPATIENT)
Dept: ONCOLOGY | Facility: CLINIC | Age: 73
End: 2023-01-05
Attending: INTERNAL MEDICINE
Payer: COMMERCIAL

## 2023-01-05 VITALS
SYSTOLIC BLOOD PRESSURE: 150 MMHG | HEART RATE: 65 BPM | BODY MASS INDEX: 35.77 KG/M2 | OXYGEN SATURATION: 97 % | DIASTOLIC BLOOD PRESSURE: 88 MMHG | HEIGHT: 68 IN | WEIGHT: 236 LBS

## 2023-01-05 DIAGNOSIS — C61 PROSTATE CANCER (H): Primary | ICD-10-CM

## 2023-01-05 PROCEDURE — 99214 OFFICE O/P EST MOD 30 MIN: CPT | Mod: 95 | Performed by: INTERNAL MEDICINE

## 2023-01-05 PROCEDURE — 96402 CHEMO HORMON ANTINEOPL SQ/IM: CPT

## 2023-01-05 ASSESSMENT — PAIN SCALES - GENERAL: PAINLEVEL: NO PAIN (0)

## 2023-01-05 NOTE — PROGRESS NOTES
[facility] CANCER CLINIC  FOLLOW-UP VISIT NOTE    PATIENT NAME: Boaz Acosta MRN # [id number]  DATE OF VISIT: Jan 5, 2023 YOB: 1950    REFERRING PROVIDER: Rocío Boss MD  [address] [address]    CANCER TYPE: Prostate adenocarcinoma  STAGE: IV  ECOG PS: 0    TREATMENT SUMMARY:  Boaz's annual screening PSA was noted to be elevated at 17 in 2012, and 21 in the following year and it was attributed to prostatitis. Eventually, he did change his primary care physician and his repeat PSA was noted to be elevated at 31 in 04/2014. He was then referred to Urology, and was seen by Dr. Pressley with Urology in 07/2014. He was worked up with a biopsy of the prostate on 08/01//2014, which revealed adenocarcinoma of the prostate with Willis score of 4+3 involving 12 of the 12 cores. He was staged with a PET/CT scan, which did not show any evidence of distant metastasis. There was evidence of increased FDG uptake throughout the prostate with a maximum SUV of 7.1. He had a bone scan done on 08/19/2014, which did not show any obvious evidence of metastasis. Mr. Acosta underwent a robotic-assisted radical prostatectomy and lymph node sampling performed by Dr. Hesham Prieto on 10/24/2014. The pathology from this revealed adenocarcinoma of the prostate with a Willis of 7 and a tertiary pattern of 5. There was extensive prostatic tissue involvement with over 75% of the tissue with evidence of disease. There was extensive extraprostatic extension involving the apex, right and left anterior and posterior base, and soft tissue around both seminal vesicles. There was bilateral seminal vesicle invasion with involvement of vas deferens. Margins were involved with bladder neck and bilateral posterior seminal vesicle soft tissue apex. There was evidence of perineural invasion. The lymph node sample was negative for disease. His postoperative PSA  in 12/2014 remained elevated at 7.1, and all the subsequent PSAs have remained positive. His restaging scans done in January and again in 03/2015, including a CT abdomen and pelvis and bone scans, have been negative. He was referred to Radiation Oncology for consideration of salvage radiation therapy, and was seen by Dr. Murphy on 03/24/2015. Dr. Murphy referred him to Dr. Zbigniwe Chu at the [facility] to have a choline-11 PET/CT scan done. The choline-11 scan done at [facility] demonstrated evidence of 2 retroperitoneal lymph nodes that are mildly positive for uptake. There was no abnormal uptake in the prostate bed to suggest local recurrence. He was started on bicalutamide, and received his first dose of Lupron on 05/12/2015.   He comes for a scheduled follow up visit     5/12/2015 9/2/2015 1/4/2016 6/20/2019 7/30/2020 7/15/2021   leuprolide  IM 30 mg 30 mg 30 mg 30 mg 30 mg 45 mg     CURRENT TREATMENT  Break period of intermittent androgen deprivation therapy    SUBJECTIVE   Boaz comes for a scheduled follow up visit on androgen ablation therapy.     Boaz was seen over video.  He has been in good health since last visit.  He notes that he can feel his retroperitoneal nodes. He has 1-2 / 10 discomfort from this.     He has been trying to lose weight and has successfully lost some weight.     He is aware of his labs including his PSA. He is quite happy about his PSA.     He has been doing well and denies any other complains.      Wt Readings from Last 10 Encounters:   01/05/23 107 kg (236 lb)   10/18/22 113.5 kg (250 lb 3.2 oz)   10/10/22 111.7 kg (246 lb 3.2 oz)   07/07/22 110.1 kg (242 lb 11.2 oz)   01/06/22 110.2 kg (243 lb)   11/08/21 108.4 kg (239 lb)   07/29/21 111.9 kg (246 lb 9.6 oz)   07/15/21 112.9 kg (249 lb)   07/15/21 112.9 kg (249 lb)   04/21/21 113.4 kg (250 lb)       PAST MEDICAL HISTORY   1. Prostate cancer with disease metastatic to para-aortic nodes   2. Borderline HTN  3. ADALID an CPAP at night  4. GERD      " CURRENT OUTPATIENT MEDICATIONS     Current Outpatient Medications   Medication Sig     ascorbic acid (VITAMIN C) 1000 MG TABS Take 1-2 tablets by mouth as needed      diclofenac (VOLTAREN) 1 % topical gel 1 gram to affected areas on feet 2-3 times daily as needed for Gout.     indomethacin (INDOCIN) 50 MG capsule Take 1 capsule (50 mg) by mouth 2 times daily (with meals) Take as needed for Gout.     Leuprolide Acetate, 4 Month, (LUPRON DEPOT, 4-MONTH, IM) Inject 45 mg into the muscle      lisinopril (ZESTRIL) 30 MG tablet TAKE 1 TABLET BY MOUTH EVERY DAY     Multiple Vitamin (DAILY MULTIVITAMIN PO) Take 1 tablet by mouth daily.     ORDER FOR DME, SET TO FAX, PATIENT WAS SET UP ON A [employer] AUTO MACHINE AT [employer] WITH HEATED HUMIDITY. PATIENT HAD A CHOICE OF MASK AND CHOSE THE AIRFIT P10 SIZE MEDIUM NASAL PILLOW. HE HAS A F/U APPOINTMENT TO HIS SLEEP STUDY 2/5 WITH TURNER GLASER PA-C AND A SECOND ONE SCHEDULED IN 6 WEEKS.     UNABLE TO FIND MEDICATION NAME: Turkey tail supplement     No current facility-administered medications for this visit.         ALLERGIES     Allergies   Allergen Reactions     Chlorthalidone         REVIEW OF SYSTEMS   As above in the HPI, o/w complete 12-point ROS was negative.     PHYSICAL EXAM   BP (!) 150/88 (BP Location: Left arm, Patient Position: Chair, Cuff Size: Adult Large)   Pulse 65   Ht 1.727 m (5' 8\")   Wt 107 kg (236 lb)   SpO2 97%   BMI 35.88 kg/m      SpO2 Readings from Last 4 Encounters:   06/18/18 96%   10/30/17 97%   10/16/17 97%   06/19/17 96%     Wt Readings from Last 3 Encounters:   01/05/23 107 kg (236 lb)   10/18/22 113.5 kg (250 lb 3.2 oz)   10/10/22 111.7 kg (246 lb 3.2 oz)     GEN: NAD  HEENT: PERRL, EOMI, no icterus, injection or pallor. Oropharynx is clear.  NECK: no cervical or supraclavicular lymphadenopathy  LUNGS: clear bilaterally  CV: regular, no murmurs, rubs, or gallops  ABDOMEN: soft, non-tender, non-distended, normal bowel sounds, no " hepatosplenomegaly by percussion or palpation  EXT: warm, well perfused, no edema  NEURO: alert  SKIN: lesion in subxiphoid (epigastrium) as below     LABORATORY AND IMAGING STUDIES     Recent Labs   Lab Test 01/02/23  0845 06/30/22  0851 01/03/22  0951 11/02/21  1342 07/12/21  0841    139 143 136 137   POTASSIUM 4.2 4.7 4.3 4.2 4.1   CHLORIDE 109 109 110* 101 104   CO2 27 26 29 30 30   ANIONGAP 4 4 4 5 3   BUN 14 21 11 30 12   CR 0.89 1.01 0.85 1.33* 1.01   * 111* 120* 116* 118*   KERRY 9.3 9.4 8.9 11.0* 9.6     No results for input(s): MAG, PHOS in the last 01430 hours.  Recent Labs   Lab Test 01/02/23  0845 06/30/22  0851 01/03/22  0951 11/02/21  1342 07/12/21  0841   WBC 7.4 7.3 7.0 6.8 6.0   HGB 12.8* 12.8* 13.2* 13.2* 14.3    250 256 264 228   MCV 95 98 100 98 96   NEUTROPHIL 48 56 54 55 51     Recent Labs   Lab Test 01/02/23  0845 06/30/22  0851 01/03/22  0951   BILITOTAL 0.5 0.3 0.6   ALKPHOS 57 53 56   ALT 30 43 88*   AST 24 35 72*   ALBUMIN 3.5 3.5 3.6    202 206     TSH   Date Value Ref Range Status   03/14/2005 2.22 0.4 - 5.0 mU/L Final     No results for input(s): CEA in the last 00689 hours.  Results for orders placed or performed during the hospital encounter of 11/04/21   MRA Brain ([facility]) wo Contrast    Narrative    MR ANGIOGRAM OF THE HEAD WITHOUT CONTRAST   11/4/2021 9:18 AM     HISTORY: Transient neurological symptoms. Prostate cancer.    TECHNIQUE:  3D time-of-flight MR angiogram of the head without  contrast.    COMPARISON: None.    FINDINGS: The major intracranial arteries including the proximal  branches of the anterior cerebral, middle cerebral, and posterior  cerebral arteries appear patent without vascular cutoff. No aneurysm  identified. No significant stenosis.      Impression    IMPRESSION: Normal MR angiogram of the head.        TEJAL ARIZMENDI MD         SYSTEM ID:  [id number]     Recent Labs   Lab Test 01/02/23  0845 06/30/22  0851 01/03/22  0951  11/02/21  1342 07/12/21  0841 01/18/21  0757   PSA 6.12* 0.86 0.17 0.33 6.79* 0.38   TESTOSTTOTAL 578 638 10*  --  389 571        ASSESSMENT AND PLAN   1. Prostate cancer with metastasis to retroperitoneal nodes with persistent PSA elevation post prostatectomy; ish 4+3 disease, tertiary score of 5  2. Elevated LFT's, obesity, elevated blood pressure at this visit  3. ECOG PS 0  4. No other medical comorbiditiies    Boaz is alone at this clinic visit.  He has been doing very well since the last visit.  He has no physical complaints at this visit.    I have reviewed all of the labs done prior to this clinic visit.  Labs are all completely normal including electrolytes, renal function, complete blood count and differential except for mild normocytic anemia which has been stable. His hepatic panel which had revealed persistent elevation in his transaminases has resolved at this visit. Even GGT is normal which too was elevated in past.     He has been making a fair effort to lose weight and has lost some weight. His LFT has normalized since last visit.     He is aware of his PSA values.  His PSA has gone up to 6.12 ng/ml.  His PSA had rapidly rebounded twice when we used 4 month dose of leuprolide. We did have a good 6-12 month off period this time that we did a 6 month dose.      He notes that he can feel his retroperitoneal nodes and they are causing some discomfort 1-2/10. I do not feel that he can palpate his retroperitoneal nodes as these are very deep. I offered to get a PET scan to assess the disease burden. He expressed it is not necessary at this time.      I will again see him in 6 months with labs a week prior to visit including CBC, CMP, PSA, testosterone, GGT.      Video-Visit Details    Type of service:  Video Visit  Originating Location (pt. Location): Onsite - clinic  Distant Location (provider location):  [facility] [employer]  Platform used for Video Visit: Un[employer]  minutes spent on the date of the encounter doing chart review, history and exam, documentation and further activities as noted above      Pavel Norton    Hematologist and Medical Oncologist  [facility]

## 2023-01-05 NOTE — NURSING NOTE
"Oncology Rooming Note    January 5, 2023 8:23 AM   Boaz Acosta is a 72 year old male who presents for:    Chief Complaint   Patient presents with     Oncology Clinic Visit     Follow up     Initial Vitals: BP (!) 150/88 (BP Location: Left arm, Patient Position: Chair, Cuff Size: Adult Large)   Pulse 65   Ht 1.727 m (5' 8\")   Wt 107 kg (236 lb)   SpO2 97%   BMI 35.88 kg/m   Estimated body mass index is 35.88 kg/m  as calculated from the following:    Height as of this encounter: 1.727 m (5' 8\").    Weight as of this encounter: 107 kg (236 lb). Body surface area is 2.27 meters squared.  No Pain (0) Comment: Data Unavailable   No LMP for male patient.  Allergies reviewed: Yes  Medications reviewed: Yes    Medications: Medication refills not needed today.  Pharmacy name entered into [employer]: [facility] [id number] [place] [address]    Clinical concerns: Discomfort - retroperitoneal area     Dr. Norton was notified.      Juliette Salvador CMA              "

## 2023-01-05 NOTE — LETTER
1/5/2023         RE: Boaz Acosta  [address] [facility] [id number]        Dear Colleague,    Thank you for referring your patient, Boaz Acosta, to the [facility]. Please see a copy of my visit note below.    [facility]  FOLLOW-UP VISIT NOTE    PATIENT NAME: Boaz Acosta MRN # [id number]  DATE OF VISIT: Jan 5, 2023 YOB: 1950    REFERRING PROVIDER: Rocío Boss MD  [facility]  [address] [facility] [place] [address]    CANCER TYPE: Prostate adenocarcinoma  STAGE: IV  ECOG PS: 0    TREATMENT SUMMARY:  Boaz's annual screening PSA was noted to be elevated at 17 in 2012, and 21 in the following year and it was attributed to prostatitis. Eventually, he did change his primary care physician and his repeat PSA was noted to be elevated at 31 in 04/2014. He was then referred to Urology, and was seen by Dr. Pressley with Urology in 07/2014. He was worked up with a biopsy of the prostate on 08/01//2014, which revealed adenocarcinoma of the prostate with Fields score of 4+3 involving 12 of the 12 cores. He was staged with a PET/CT scan, which did not show any evidence of distant metastasis. There was evidence of increased FDG uptake throughout the prostate with a maximum SUV of 7.1. He had a bone scan done on 08/19/2014, which did not show any obvious evidence of metastasis. Mr. Acosta underwent a robotic-assisted radical prostatectomy and lymph node sampling performed by Dr. Hehsam Prieto on 10/24/2014. The pathology from this revealed adenocarcinoma of the prostate with a Willis of 7 and a tertiary pattern of 5. There was extensive prostatic tissue involvement with over 75% of the tissue with evidence of disease. There was extensive extraprostatic extension involving the apex, right and left anterior and posterior base, and soft tissue around both seminal vesicles. There was bilateral seminal  vesicle invasion with involvement of vas deferens. Margins were involved with bladder neck and bilateral posterior seminal vesicle soft tissue apex. There was evidence of perineural invasion. The lymph node sample was negative for disease. His postoperative PSA in 12/2014 remained elevated at 7.1, and all the subsequent PSAs have remained positive. His restaging scans done in January and again in 03/2015, including a CT abdomen and pelvis and bone scans, have been negative. He was referred to Radiation Oncology for consideration of salvage radiation therapy, and was seen by Dr. Murphy on 03/24/2015. Dr. Murphy referred him to Dr. Zbigniew Chu at the [facility] to have a choline-11 PET/CT scan done. The choline-11 scan done at [place] demonstrated evidence of 2 retroperitoneal lymph nodes that are mildly positive for uptake. There was no abnormal uptake in the prostate bed to suggest local recurrence. He was started on bicalutamide, and received his first dose of Lupron on 05/12/2015.   He comes for a scheduled follow up visit     5/12/2015 9/2/2015 1/4/2016 6/20/2019 7/30/2020 7/15/2021   leuprolide  IM 30 mg 30 mg 30 mg 30 mg 30 mg 45 mg     CURRENT TREATMENT  Break period of intermittent androgen deprivation therapy    SUBJECTIVE   Boaz comes for a scheduled follow up visit on androgen ablation therapy.     Boaz was seen over video.  He has been in good health since last visit.  He notes that he can feel his retroperitoneal nodes. He has 1-2 / 10 discomfort from this.     He has been trying to lose weight and has successfully lost some weight.     He is aware of his labs including his PSA. He is quite happy about his PSA.     He has been doing well and denies any other complains.      Wt Readings from Last 10 Encounters:   01/05/23 107 kg (236 lb)   10/18/22 113.5 kg (250 lb 3.2 oz)   10/10/22 111.7 kg (246 lb 3.2 oz)   07/07/22 110.1 kg (242 lb 11.2 oz)   01/06/22 110.2 kg (243 lb)   11/08/21 108.4 kg (239 lb)   07/29/21  "111.9 kg (246 lb 9.6 oz)   07/15/21 112.9 kg (249 lb)   07/15/21 112.9 kg (249 lb)   04/21/21 113.4 kg (250 lb)       PAST MEDICAL HISTORY   1. Prostate cancer with disease metastatic to para-aortic nodes   2. Borderline HTN  3. ADALID an CPAP at night  4. GERD      CURRENT OUTPATIENT MEDICATIONS     Current Outpatient Medications   Medication Sig     ascorbic acid (VITAMIN C) 1000 MG TABS Take 1-2 tablets by mouth as needed      diclofenac (VOLTAREN) 1 % topical gel 1 gram to affected areas on feet 2-3 times daily as needed for Gout.     indomethacin (INDOCIN) 50 MG capsule Take 1 capsule (50 mg) by mouth 2 times daily (with meals) Take as needed for Gout.     Leuprolide Acetate, 4 Month, (LUPRON DEPOT, 4-MONTH, IM) Inject 45 mg into the muscle      lisinopril (ZESTRIL) 30 MG tablet TAKE 1 TABLET BY MOUTH EVERY DAY     Multiple Vitamin (DAILY MULTIVITAMIN PO) Take 1 tablet by mouth daily.     ORDER FOR DME, SET TO FAX, PATIENT WAS SET UP ON A [employer] 560 AUTO MACHINE AT [employer] 9CMH2O WITH HEATED HUMIDITY. PATIENT HAD A CHOICE OF MASK AND CHOSE THE AIRFIT P10 SIZE MEDIUM NASAL PILLOW. HE HAS A F/U APPOINTMENT TO HIS SLEEP STUDY 2/5 WITH TURNER GLASER PA-C AND A SECOND ONE SCHEDULED IN 6 WEEKS.     UNABLE TO FIND MEDICATION NAME: Turkey tail supplement     No current facility-administered medications for this visit.         ALLERGIES     Allergies   Allergen Reactions     Chlorthalidone         REVIEW OF SYSTEMS   As above in the HPI, o/w complete 12-point ROS was negative.     PHYSICAL EXAM   BP (!) 150/88 (BP Location: Left arm, Patient Position: Chair, Cuff Size: Adult Large)   Pulse 65   Ht 1.727 m (5' 8\")   Wt 107 kg (236 lb)   SpO2 97%   BMI 35.88 kg/m      SpO2 Readings from Last 4 Encounters:   06/18/18 96%   10/30/17 97%   10/16/17 97%   06/19/17 96%     Wt Readings from Last 3 Encounters:   01/05/23 107 kg (236 lb)   10/18/22 113.5 kg (250 lb 3.2 oz)   10/10/22 111.7 kg (246 lb 3.2 oz)     GEN: " NAD  HEENT: PERRL, EOMI, no icterus, injection or pallor. Oropharynx is clear.  NECK: no cervical or supraclavicular lymphadenopathy  LUNGS: clear bilaterally  CV: regular, no murmurs, rubs, or gallops  ABDOMEN: soft, non-tender, non-distended, normal bowel sounds, no hepatosplenomegaly by percussion or palpation  EXT: warm, well perfused, no edema  NEURO: alert  SKIN: lesion in subxiphoid (epigastrium) as below     LABORATORY AND IMAGING STUDIES     Recent Labs   Lab Test 01/02/23  0845 06/30/22  0851 01/03/22  0951 11/02/21  1342 07/12/21  0841    139 143 136 137   POTASSIUM 4.2 4.7 4.3 4.2 4.1   CHLORIDE 109 109 110* 101 104   CO2 27 26 29 30 30   ANIONGAP 4 4 4 5 3   BUN 14 21 11 30 12   CR 0.89 1.01 0.85 1.33* 1.01   * 111* 120* 116* 118*   KERRY 9.3 9.4 8.9 11.0* 9.6     No results for input(s): MAG, PHOS in the last 46483 hours.  Recent Labs   Lab Test 01/02/23  0845 06/30/22  0851 01/03/22  0951 11/02/21  1342 07/12/21  0841   WBC 7.4 7.3 7.0 6.8 6.0   HGB 12.8* 12.8* 13.2* 13.2* 14.3    250 256 264 228   MCV 95 98 100 98 96   NEUTROPHIL 48 56 54 55 51     Recent Labs   Lab Test 01/02/23  0845 06/30/22  0851 01/03/22  0951   BILITOTAL 0.5 0.3 0.6   ALKPHOS 57 53 56   ALT 30 43 88*   AST 24 35 72*   ALBUMIN 3.5 3.5 3.6    202 206     TSH   Date Value Ref Range Status   03/14/2005 2.22 0.4 - 5.0 mU/L Final     No results for input(s): CEA in the last 90657 hours.  Results for orders placed or performed during the hospital encounter of 11/04/21   MRA Brain ([place]) wo Contrast    Narrative    MR ANGIOGRAM OF THE HEAD WITHOUT CONTRAST   11/4/2021 9:18 AM     HISTORY: Transient neurological symptoms. Prostate cancer.    TECHNIQUE:  3D time-of-flight MR angiogram of the head without  contrast.    COMPARISON: None.    FINDINGS: The major intracranial arteries including the proximal  branches of the anterior cerebral, middle cerebral, and posterior  cerebral arteries appear patent  without vascular cutoff. No aneurysm  identified. No significant stenosis.      Impression    IMPRESSION: Normal MR angiogram of the head.        TEJAL ARIZMENDI MD         SYSTEM ID:  [email or web address]     Recent Labs   Lab Test 01/02/23  0845 06/30/22  0851 01/03/22  0951 11/02/21  1342 07/12/21  0841 01/18/21  0757   PSA 6.12* 0.86 0.17 0.33 6.79* 0.38   TESTOSTTOTAL 578 638 10*  --  183 431        ASSESSMENT AND PLAN   1. Prostate cancer with metastasis to retroperitoneal nodes with persistent PSA elevation post prostatectomy; ish 4+3 disease, tertiary score of 5  2. Elevated LFT's, obesity, elevated blood pressure at this visit  3. ECOG PS 0  4. No other medical comorbiditiies    Boaz is alone at this clinic visit.  He has been doing very well since the last visit.  He has no physical complaints at this visit.    I have reviewed all of the labs done prior to this clinic visit.  Labs are all completely normal including electrolytes, renal function, complete blood count and differential except for mild normocytic anemia which has been stable. His hepatic panel which had revealed persistent elevation in his transaminases has resolved at this visit. Even GGT is normal which too was elevated in past.     He has been making a fair effort to lose weight and has lost some weight. His LFT has normalized since last visit.     He is aware of his PSA values.  His PSA has gone up to 6.12 ng/ml.  His PSA had rapidly rebounded twice when we used 4 month dose of leuprolide. We did have a good 6-12 month off period this time that we did a 6 month dose.      He notes that he can feel his retroperitoneal nodes and they are causing some discomfort 1-2/10. I do not feel that he can palpate his retroperitoneal nodes as these are very deep. I offered to get a PET scan to assess the disease burden. He expressed it is not necessary at this time.      I will again see him in 6 months with labs a week prior to visit including CBC, CMP, PSA,  testosterone, GGT.      Video-Visit Details    Type of service:  Video Visit  Originating Location (pt. Location): Onsite - clinic  Distant Location (provider location):  offsite  [facility] [employer]  Platform used for Video Visit: [employer]    25 minutes spent on the date of the encounter doing chart review, history and exam, documentation and further activities as noted above      Pavel Norton    Hematologist and Medical Oncologist  [facility]        Again, thank you for allowing me to participate in the care of your patient.        Sincerely,        Pavel Norton MD

## 2023-04-01 ENCOUNTER — HEALTH MAINTENANCE LETTER (OUTPATIENT)
Age: 73
End: 2023-04-01

## 2023-06-10 DIAGNOSIS — I10 UNCONTROLLED HYPERTENSION: ICD-10-CM

## 2023-06-15 RX ORDER — LISINOPRIL 30 MG/1
30 TABLET ORAL DAILY
Qty: 90 TABLET | Refills: 0 | Status: SHIPPED | OUTPATIENT
Start: 2023-06-15 | End: 2023-12-11

## 2023-07-03 ENCOUNTER — LAB (OUTPATIENT)
Dept: LAB | Facility: CLINIC | Age: 73
End: 2023-07-03
Payer: COMMERCIAL

## 2023-07-03 DIAGNOSIS — C61 PROSTATE CANCER (H): ICD-10-CM

## 2023-07-03 LAB
ALBUMIN SERPL BCG-MCNC: 4.3 G/DL (ref 3.5–5.2)
ALP SERPL-CCNC: 67 U/L (ref 40–129)
ALT SERPL W P-5'-P-CCNC: 21 U/L (ref 0–70)
ANION GAP SERPL CALCULATED.3IONS-SCNC: 11 MMOL/L (ref 7–15)
AST SERPL W P-5'-P-CCNC: 31 U/L (ref 0–45)
BASOPHILS # BLD AUTO: 0.1 10E3/UL (ref 0–0.2)
BASOPHILS NFR BLD AUTO: 1 %
BILIRUB SERPL-MCNC: 0.8 MG/DL
BUN SERPL-MCNC: 18 MG/DL (ref 8–23)
CALCIUM SERPL-MCNC: 8.6 MG/DL (ref 8.8–10.2)
CHLORIDE SERPL-SCNC: 104 MMOL/L (ref 98–107)
CREAT SERPL-MCNC: 1.07 MG/DL (ref 0.67–1.17)
DEPRECATED HCO3 PLAS-SCNC: 26 MMOL/L (ref 22–29)
EOSINOPHIL # BLD AUTO: 0.2 10E3/UL (ref 0–0.7)
EOSINOPHIL NFR BLD AUTO: 2 %
ERYTHROCYTE [DISTWIDTH] IN BLOOD BY AUTOMATED COUNT: 13.4 % (ref 10–15)
GFR SERPL CREATININE-BSD FRML MDRD: 74 ML/MIN/1.73M2
GGT SERPL-CCNC: 33 U/L (ref 8–61)
GLUCOSE SERPL-MCNC: 135 MG/DL (ref 70–99)
HCT VFR BLD AUTO: 36.7 % (ref 40–53)
HGB BLD-MCNC: 12.3 G/DL (ref 13.3–17.7)
IMM GRANULOCYTES # BLD: 0 10E3/UL
IMM GRANULOCYTES NFR BLD: 0 %
LDH SERPL L TO P-CCNC: 203 U/L (ref 0–250)
LYMPHOCYTES # BLD AUTO: 2.1 10E3/UL (ref 0.8–5.3)
LYMPHOCYTES NFR BLD AUTO: 30 %
MCH RBC QN AUTO: 31.5 PG (ref 26.5–33)
MCHC RBC AUTO-ENTMCNC: 33.5 G/DL (ref 31.5–36.5)
MCV RBC AUTO: 94 FL (ref 78–100)
MONOCYTES # BLD AUTO: 0.7 10E3/UL (ref 0–1.3)
MONOCYTES NFR BLD AUTO: 10 %
NEUTROPHILS # BLD AUTO: 3.9 10E3/UL (ref 1.6–8.3)
NEUTROPHILS NFR BLD AUTO: 57 %
NRBC # BLD AUTO: 0 10E3/UL
NRBC BLD AUTO-RTO: 0 /100
PLATELET # BLD AUTO: 228 10E3/UL (ref 150–450)
POTASSIUM SERPL-SCNC: 4.4 MMOL/L (ref 3.4–5.3)
PROT SERPL-MCNC: 7.3 G/DL (ref 6.4–8.3)
PSA SERPL DL<=0.01 NG/ML-MCNC: 0.26 NG/ML (ref 0–6.5)
RBC # BLD AUTO: 3.91 10E6/UL (ref 4.4–5.9)
SODIUM SERPL-SCNC: 141 MMOL/L (ref 136–145)
WBC # BLD AUTO: 6.9 10E3/UL (ref 4–11)

## 2023-07-03 PROCEDURE — 84403 ASSAY OF TOTAL TESTOSTERONE: CPT

## 2023-07-03 PROCEDURE — 80053 COMPREHEN METABOLIC PANEL: CPT

## 2023-07-03 PROCEDURE — 85025 COMPLETE CBC W/AUTO DIFF WBC: CPT

## 2023-07-03 PROCEDURE — 83615 LACTATE (LD) (LDH) ENZYME: CPT

## 2023-07-03 PROCEDURE — 36415 COLL VENOUS BLD VENIPUNCTURE: CPT

## 2023-07-03 PROCEDURE — 82977 ASSAY OF GGT: CPT

## 2023-07-03 PROCEDURE — 84153 ASSAY OF PSA TOTAL: CPT

## 2023-07-06 LAB — TESTOST SERPL-MCNC: 8 NG/DL (ref 240–950)

## 2023-07-20 ENCOUNTER — ONCOLOGY VISIT (OUTPATIENT)
Dept: ONCOLOGY | Facility: CLINIC | Age: 73
End: 2023-07-20
Attending: INTERNAL MEDICINE
Payer: COMMERCIAL

## 2023-07-20 VITALS
HEART RATE: 67 BPM | OXYGEN SATURATION: 97 % | WEIGHT: 239.6 LBS | SYSTOLIC BLOOD PRESSURE: 138 MMHG | HEIGHT: 68 IN | DIASTOLIC BLOOD PRESSURE: 74 MMHG | RESPIRATION RATE: 16 BRPM | BODY MASS INDEX: 36.31 KG/M2

## 2023-07-20 DIAGNOSIS — R79.89 ELEVATED LFTS: ICD-10-CM

## 2023-07-20 DIAGNOSIS — C61 PROSTATE CANCER (H): Primary | ICD-10-CM

## 2023-07-20 PROCEDURE — 99214 OFFICE O/P EST MOD 30 MIN: CPT | Performed by: INTERNAL MEDICINE

## 2023-07-20 ASSESSMENT — PAIN SCALES - GENERAL: PAINLEVEL: NO PAIN (0)

## 2023-07-20 NOTE — NURSING NOTE
"Oncology Rooming Note    July 20, 2023 10:01 AM   Boaz Acosta is a 73 year old male who presents for:    Chief Complaint   Patient presents with     Oncology Clinic Visit     6 month follow up     Initial Vitals: /74 (BP Location: Left arm)   Pulse 67   Resp 16   Ht 1.727 m (5' 7.99\")   Wt 108.7 kg (239 lb 9.6 oz)   SpO2 97%   BMI 36.44 kg/m   Estimated body mass index is 36.44 kg/m  as calculated from the following:    Height as of this encounter: 1.727 m (5' 7.99\").    Weight as of this encounter: 108.7 kg (239 lb 9.6 oz). Body surface area is 2.28 meters squared.  No Pain (0) Comment: Data Unavailable   No LMP for male patient.  Allergies reviewed: Yes  Medications reviewed: Yes    Medications: Medication refills not needed today.  Pharmacy name entered into [employer]: [id number] [place] [address]    Clinical concerns: No new concerns         Donna Parnell LPN            "

## 2023-07-20 NOTE — PROGRESS NOTES
[facility] CANCER CLINIC  FOLLOW-UP VISIT NOTE    PATIENT NAME: Boaz Acosta MRN # [id number]  DATE OF VISIT: Jul 20, 2023 YOB: 1950    REFERRING PROVIDER: Rocío Boss MD  [address] [address]    CANCER TYPE: Prostate adenocarcinoma  STAGE: IV  ECOG PS: 0    TREATMENT SUMMARY:  Boaz's annual screening PSA was noted to be elevated at 17 in 2012, and 21 in the following year and it was attributed to prostatitis. Eventually, he did change his primary care physician and his repeat PSA was noted to be elevated at 31 in 04/2014. He was then referred to Urology, and was seen by Dr. Pressley with Urology in 07/2014. He was worked up with a biopsy of the prostate on 08/01//2014, which revealed adenocarcinoma of the prostate with Willis score of 4+3 involving 12 of the 12 cores. He was staged with a PET/CT scan, which did not show any evidence of distant metastasis. There was evidence of increased FDG uptake throughout the prostate with a maximum SUV of 7.1. He had a bone scan done on 08/19/2014, which did not show any obvious evidence of metastasis. Mr. Acosta underwent a robotic-assisted radical prostatectomy and lymph node sampling performed by Dr. Hesham Prieto on 10/24/2014. The pathology from this revealed adenocarcinoma of the prostate with a [place] of 7 and a tertiary pattern of 5. There was extensive prostatic tissue involvement with over 75% of the tissue with evidence of disease. There was extensive extraprostatic extension involving the apex, right and left anterior and posterior base, and soft tissue around both seminal vesicles. There was bilateral seminal vesicle invasion with involvement of vas deferens. Margins were involved with bladder neck and bilateral posterior seminal vesicle soft tissue apex. There was evidence of perineural invasion. The lymph node sample was negative for disease. His postoperative PSA  in 12/2014 remained elevated at 7.1, and all the subsequent PSAs have remained positive. His restaging scans done in January and again in 03/2015, including a CT abdomen and pelvis and bone scans, have been negative. He was referred to Radiation Oncology for consideration of salvage radiation therapy, and was seen by Dr. Murphy on 03/24/2015. Dr. Murphy referred him to Dr. Zbigniew Chu at the [facility] to have a choline-11 PET/CT scan done. The choline-11 scan done at [place] demonstrated evidence of 2 retroperitoneal lymph nodes that are mildly positive for uptake. There was no abnormal uptake in the prostate bed to suggest local recurrence. He was started on bicalutamide, and received his first dose of Lupron on 05/12/2015.   He comes for a scheduled follow up visit     5/12/2015 9/2/2015 1/4/2016 6/20/2019 7/30/2020   leuprolide  IM 30 mg 30 mg 30 mg 30 mg 30 mg      7/15/2021  9:43 AM 1/5/2023  8:57 AM   leuprolide (LUPRON DEPOT) IM 45 mg  45 mg      CURRENT TREATMENT  Treatment period of intermittent androgen deprivation therapy    SUBJECTIVE   Boaz comes for a scheduled follow up visit on androgen ablation therapy.     Boaz was seen in person.  He has been in good health since last visit.      He has been trying to lose weight and has successfully lost some weight.     He is aware of his labs including his PSA. He is quite happy about his PSA.     He has been doing well and denies any other complains.      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    indomethacin (INDOCIN) 50 MG capsule Take 1 capsule (50 mg) by mouth 2 times daily (with meals) Take as needed for Gout.    Leuprolide Acetate, 4 Month, (LUPRON DEPOT, 4-MONTH, IM) Inject 45 mg into the muscle     lisinopril (ZESTRIL) 30 MG tablet Take 1 tablet (30 mg) by mouth daily **Patient needs to be seen for refills**    Multiple Vitamin (DAILY MULTIVITAMIN PO) Take 1 tablet by mouth daily.    ORDER FOR DME, SET TO FAX, PATIENT WAS SET UP ON A [employer] 560 AUTO MACHINE AT [employer] 9CMH2O WITH HEATED HUMIDITY. PATIENT HAD A CHOICE OF MASK AND CHOSE THE AIRFIT P10 SIZE MEDIUM NASAL PILLOW. HE HAS A F/U APPOINTMENT TO HIS SLEEP STUDY 2/5 WITH TURNER GLASER PA-C AND A SECOND ONE SCHEDULED IN 6 WEEKS.    UNABLE TO FIND MEDICATION NAME: mushroom supplement that helps to limit tumor growth    UNABLE TO FIND MEDICATION NAME: Turkey tail supplement     No current facility-administered medications for this visit.         ALLERGIES     Allergies   Allergen Reactions    Chlorthalidone      Only issue when he is using Lupron- vision disturbances          REVIEW OF SYSTEMS   As above in the HPI, o/w complete 12-point ROS was negative.     PHYSICAL EXAM   /74 (BP Location: Left arm)   Pulse 67   Resp 16   Ht 1.727 m (5' 7.99\")   Wt 108.7 kg (239 lb 9.6 oz)   SpO2 97%   BMI 36.44 kg/m      SpO2 Readings from Last 4 Encounters:   06/18/18 96%   10/30/17 97%   10/16/17 97%   06/19/17 96%     Wt Readings from Last 3 Encounters:   07/20/23 108.7 kg (239 lb 9.6 oz)   01/05/23 107 kg (236 lb)   10/18/22 113.5 kg (250 lb 3.2 oz)     GEN: NAD  HEENT: PERRL, EOMI, no icterus, injection or pallor. Oropharynx is clear.  NECK: no cervical or supraclavicular " lymphadenopathy  LUNGS: clear bilaterally  CV: regular, no murmurs, rubs, or gallops  ABDOMEN: soft, non-tender, non-distended, normal bowel sounds, no hepatosplenomegaly by percussion or palpation  EXT: warm, well perfused, no edema  NEURO: alert  SKIN: lesion in subxiphoid (epigastrium) as below     LABORATORY AND IMAGING STUDIES      Recent Labs   Lab Test 07/03/23  0857 01/02/23  0845 06/30/22  0851 01/03/22  0951 11/02/21  1342    140 139 143 136   POTASSIUM 4.4 4.2 4.7 4.3 4.2   CHLORIDE 104 109 109 110* 101   CO2 26 27 26 29 30   ANIONGAP 11 4 4 4 5   BUN 18.0 14 21 11 30   CR 1.07 0.89 1.01 0.85 1.33*   * 102* 111* 120* 116*   KERRY 8.6* 9.3 9.4 8.9 11.0*     No results for input(s): MAG, PHOS in the last 95488 hours.  Recent Labs   Lab Test 07/03/23  0857 01/02/23  0845 06/30/22  0851 01/03/22  0951 11/02/21  1342   WBC 6.9 7.4 7.3 7.0 6.8   HGB 12.3* 12.8* 12.8* 13.2* 13.2*    237 250 256 264   MCV 94 95 98 100 98   NEUTROPHIL 57 48 56 54 55     Recent Labs   Lab Test 07/03/23  0857 01/02/23  0845 06/30/22  0851   BILITOTAL 0.8 0.5 0.3   ALKPHOS 67 57 53   ALT 21 30 43   AST 31 24 35   ALBUMIN 4.3 3.5 3.5    154 202     TSH   Date Value Ref Range Status   03/14/2005 2.22 0.4 - 5.0 mU/L Final     No results for input(s): CEA in the last 09065 hours.  Results for orders placed or performed during the hospital encounter of 11/04/21   MRA Brain ([place] [facility] [place]) wo Contrast    Narrative    MR ANGIOGRAM OF THE HEAD WITHOUT CONTRAST   11/4/2021 9:18 AM     HISTORY: Transient neurological symptoms. Prostate cancer.    TECHNIQUE:  3D time-of-flight MR angiogram of the head without  contrast.    COMPARISON: None.    FINDINGS: The major intracranial arteries including the proximal  branches of the anterior cerebral, middle cerebral, and posterior  cerebral arteries appear patent without vascular cutoff. No aneurysm  identified. No significant stenosis.      Impression    IMPRESSION: Normal  MR angiogram of the head.        TEJAL ARIZMENDI MD         SYSTEM ID:  [email or web address]     Recent Labs   Lab Test 07/03/23  0857 01/02/23  0845 06/30/22  0851 01/03/22  0951 11/02/21  1342 07/12/21  0841   PSA 0.26 6.12* 0.86 0.17 0.33 6.79*   TESTOSTTOTAL 8* 578 638 10*  --  595       ASSESSMENT AND PLAN   Prostate cancer with metastasis to retroperitoneal nodes with persistent PSA elevation post prostatectomy; ish 4+3 disease, tertiary score of 5  Elevated LFT's, obesity, elevated blood pressure at this visit  ECOG PS 0  No other medical comorbiditiies    Boaz is alone at this clinic visit.  He has been doing very well since the last visit.  He has no physical complaints at this visit.    I have reviewed all of the labs done prior to this clinic visit.  Labs are all completely normal including electrolytes, renal function, complete blood count and differential except for mild normocytic anemia which has been stable. His hepatic panel which had revealed persistent elevation in his transaminases has resolved at this visit. Even GGT is normal which too was elevated in past.     He has been making a fair effort to lose weight and has lost some weight. His LFT has normalized since last visit.     He is aware of his PSA values.  His PSA has gone up to 6.12 ng/ml.  His PSA had rapidly rebounded twice when we used 4 month dose of leuprolide. We did have a good 6-12 month off period with a 6 month dose.      I will again see him in 6 months with labs a week prior to visit including CBC, CMP, PSA, testosterone, GGT.      25 minutes spent on the date of the encounter doing chart review, history and exam, documentation and further activities as noted above      Pavel Norton    Hematologist and Medical Oncologist  [facility]

## 2023-07-20 NOTE — LETTER
7/20/2023         RE: Boaz Acosta  [address] [facility] [address]        Dear Colleague,    Thank you for referring your patient, Boaz Acosta, to the [facility]. Please see a copy of my visit note below.    [facility]  FOLLOW-UP VISIT NOTE    PATIENT NAME: Boaz Acosta MRN # [id number]  DATE OF VISIT: Jul 20, 2023 YOB: 1950    REFERRING PROVIDER: Rocío Boss MD  [facility]  [address] [place] [address]    CANCER TYPE: Prostate adenocarcinoma  STAGE: IV  ECOG PS: 0    TREATMENT SUMMARY:  Boaz's annual screening PSA was noted to be elevated at 17 in 2012, and 21 in the following year and it was attributed to prostatitis. Eventually, he did change his primary care physician and his repeat PSA was noted to be elevated at 31 in 04/2014. He was then referred to Urology, and was seen by Dr. Pressley with Urology in 07/2014. He was worked up with a biopsy of the prostate on 08/01//2014, which revealed adenocarcinoma of the prostate with [place] score of 4+3 involving 12 of the 12 cores. He was staged with a PET/CT scan, which did not show any evidence of distant metastasis. There was evidence of increased FDG uptake throughout the prostate with a maximum SUV of 7.1. He had a bone scan done on 08/19/2014, which did not show any obvious evidence of metastasis. Mr. Acosta underwent a robotic-assisted radical prostatectomy and lymph node sampling performed by Dr. Hesham Prieto on 10/24/2014. The pathology from this revealed adenocarcinoma of the prostate with a [place] of 7 and a tertiary pattern of 5. There was extensive prostatic tissue involvement with over 75% of the tissue with evidence of disease. There was extensive extraprostatic extension involving the apex, right and left anterior and posterior base, and soft tissue around both seminal vesicles. There was bilateral seminal  vesicle invasion with involvement of vas deferens. Margins were involved with bladder neck and bilateral posterior seminal vesicle soft tissue apex. There was evidence of perineural invasion. The lymph node sample was negative for disease. His postoperative PSA in 12/2014 remained elevated at 7.1, and all the subsequent PSAs have remained positive. His restaging scans done in January and again in 03/2015, including a CT abdomen and pelvis and bone scans, have been negative. He was referred to Radiation Oncology for consideration of salvage radiation therapy, and was seen by Dr. Murphy on 03/24/2015. Dr. Murphy referred him to Dr. Zbigniew Chu at the [facility] to have a choline-11 PET/CT scan done. The choline-11 scan done at [place] demonstrated evidence of 2 retroperitoneal lymph nodes that are mildly positive for uptake. There was no abnormal uptake in the prostate bed to suggest local recurrence. He was started on bicalutamide, and received his first dose of Lupron on 05/12/2015.   He comes for a scheduled follow up visit     5/12/2015 9/2/2015 1/4/2016 6/20/2019 7/30/2020   leuprolide  IM 30 mg 30 mg 30 mg 30 mg 30 mg      7/15/2021  9:43 AM 1/5/2023  8:57 AM   leuprolide (LUPRON DEPOT) IM 45 mg  45 mg      CURRENT TREATMENT  Treatment period of intermittent androgen deprivation therapy    SUBJECTIVE   Boaz comes for a scheduled follow up visit on androgen ablation therapy.     Boza was seen in person.  He has been in good health since last visit.      He has been trying to lose weight and has successfully lost some weight.     He is aware of his labs including his PSA. He is quite happy about his PSA.     He has been doing well and denies any other complains.      Wt Readings from Last 10 Encounters:   07/20/23 108.7 kg (239 lb 9.6 oz)   01/05/23 107 kg (236 lb)   10/18/22 113.5 kg (250 lb 3.2 oz)   10/10/22 111.7 kg (246 lb 3.2 oz)   07/07/22 110.1 kg (242 lb 11.2 oz)   01/06/22 110.2 kg (243 lb)   11/08/21 108.4 kg  "(239 lb)   07/29/21 111.9 kg (246 lb 9.6 oz)   07/15/21 112.9 kg (249 lb)   07/15/21 112.9 kg (249 lb)       PAST MEDICAL HISTORY   Prostate cancer with disease metastatic to para-aortic nodes   Borderline HTN  ADALID an CPAP at night  GERD      CURRENT OUTPATIENT MEDICATIONS     Current Outpatient Medications   Medication Sig     ascorbic acid (VITAMIN C) 1000 MG TABS Take 1-2 tablets by mouth as needed      diclofenac (VOLTAREN) 1 % topical gel 1 gram to affected areas on feet 2-3 times daily as needed for Gout.     indomethacin (INDOCIN) 50 MG capsule Take 1 capsule (50 mg) by mouth 2 times daily (with meals) Take as needed for Gout.     Leuprolide Acetate, 4 Month, (LUPRON DEPOT, 4-MONTH, IM) Inject 45 mg into the muscle      lisinopril (ZESTRIL) 30 MG tablet Take 1 tablet (30 mg) by mouth daily **Patient needs to be seen for refills**     Multiple Vitamin (DAILY MULTIVITAMIN PO) Take 1 tablet by mouth daily.     ORDER FOR DME, SET TO FAX, PATIENT WAS SET UP ON A RESPIRONICS 560 AUTO MACHINE AT [facility] 9CMH2O WITH HEATED HUMIDITY. PATIENT HAD A CHOICE OF MASK AND CHOSE THE AIRFIT P10 SIZE MEDIUM NASAL PILLOW. HE HAS A F/U APPOINTMENT TO HIS SLEEP STUDY 2/5 WITH TURNER GLASER PA-C AND A SECOND ONE SCHEDULED IN 6 WEEKS.     UNABLE TO FIND MEDICATION NAME: mushroom supplement that helps to limit tumor growth     UNABLE TO FIND MEDICATION NAME: Turkey tail supplement     No current facility-administered medications for this visit.         ALLERGIES     Allergies   Allergen Reactions     Chlorthalidone      Only issue when he is using Lupron- vision disturbances          REVIEW OF SYSTEMS   As above in the HPI, o/w complete 12-point ROS was negative.     PHYSICAL EXAM   /74 (BP Location: Left arm)   Pulse 67   Resp 16   Ht 1.727 m (5' 7.99\")   Wt 108.7 kg (239 lb 9.6 oz)   SpO2 97%   BMI 36.44 kg/m      SpO2 Readings from Last 4 Encounters:   06/18/18 96%   10/30/17 97%   10/16/17 97%   06/19/17 96%     Wt " Readings from Last 3 Encounters:   07/20/23 108.7 kg (239 lb 9.6 oz)   01/05/23 107 kg (236 lb)   10/18/22 113.5 kg (250 lb 3.2 oz)     GEN: NAD  HEENT: PERRL, EOMI, no icterus, injection or pallor. Oropharynx is clear.  NECK: no cervical or supraclavicular lymphadenopathy  LUNGS: clear bilaterally  CV: regular, no murmurs, rubs, or gallops  ABDOMEN: soft, non-tender, non-distended, normal bowel sounds, no hepatosplenomegaly by percussion or palpation  EXT: warm, well perfused, no edema  NEURO: alert  SKIN: lesion in subxiphoid (epigastrium) as below     LABORATORY AND IMAGING STUDIES      Recent Labs   Lab Test 07/03/23  0857 01/02/23  0845 06/30/22  0851 01/03/22  0951 11/02/21  1342    140 139 143 136   POTASSIUM 4.4 4.2 4.7 4.3 4.2   CHLORIDE 104 109 109 110* 101   CO2 26 27 26 29 30   ANIONGAP 11 4 4 4 5   BUN 18.0 14 21 11 30   CR 1.07 0.89 1.01 0.85 1.33*   * 102* 111* 120* 116*   KERRY 8.6* 9.3 9.4 8.9 11.0*     No results for input(s): MAG, PHOS in the last 98185 hours.  Recent Labs   Lab Test 07/03/23  0857 01/02/23  0845 06/30/22  0851 01/03/22  0951 11/02/21  1342   WBC 6.9 7.4 7.3 7.0 6.8   HGB 12.3* 12.8* 12.8* 13.2* 13.2*    237 250 256 264   MCV 94 95 98 100 98   NEUTROPHIL 57 48 56 54 55     Recent Labs   Lab Test 07/03/23  0857 01/02/23  0845 06/30/22  0851   BILITOTAL 0.8 0.5 0.3   ALKPHOS 67 57 53   ALT 21 30 43   AST 31 24 35   ALBUMIN 4.3 3.5 3.5    154 202     TSH   Date Value Ref Range Status   03/14/2005 2.22 0.4 - 5.0 mU/L Final     No results for input(s): CEA in the last 92006 hours.  Results for orders placed or performed during the hospital encounter of 11/04/21   MRA Brain ([place]) wo Contrast    Narrative    MR ANGIOGRAM OF THE HEAD WITHOUT CONTRAST   11/4/2021 9:18 AM     HISTORY: Transient neurological symptoms. Prostate cancer.    TECHNIQUE:  3D time-of-flight MR angiogram of the head without  contrast.    COMPARISON: None.    FINDINGS: The major  intracranial arteries including the proximal  branches of the anterior cerebral, middle cerebral, and posterior  cerebral arteries appear patent without vascular cutoff. No aneurysm  identified. No significant stenosis.      Impression    IMPRESSION: Normal MR angiogram of the head.        TEJAL ARIZMENDI MD         SYSTEM ID:  [id number]     Recent Labs   Lab Test 07/03/23  0857 01/02/23  0845 06/30/22  0851 01/03/22  0951 11/02/21  1342 07/12/21  0841   PSA 0.26 6.12* 0.86 0.17 0.33 6.79*   TESTOSTTOTAL 8* 578 638 10*  --  595       ASSESSMENT AND PLAN   Prostate cancer with metastasis to retroperitoneal nodes with persistent PSA elevation post prostatectomy; ish 4+3 disease, tertiary score of 5  Elevated LFT's, obesity, elevated blood pressure at this visit  ECOG PS 0  No other medical comorbiditiies    oBaz is alone at this clinic visit.  He has been doing very well since the last visit.  He has no physical complaints at this visit.    I have reviewed all of the labs done prior to this clinic visit.  Labs are all completely normal including electrolytes, renal function, complete blood count and differential except for mild normocytic anemia which has been stable. His hepatic panel which had revealed persistent elevation in his transaminases has resolved at this visit. Even GGT is normal which too was elevated in past.     He has been making a fair effort to lose weight and has lost some weight. His LFT has normalized since last visit.     He is aware of his PSA values.  His PSA has gone up to 6.12 ng/ml.  His PSA had rapidly rebounded twice when we used 4 month dose of leuprolide. We did have a good 6-12 month off period with a 6 month dose.      I will again see him in 6 months with labs a week prior to visit including CBC, CMP, PSA, testosterone, GGT.      25 minutes spent on the date of the encounter doing chart review, history and exam, documentation and further activities as noted above      Pavel  Cierra    Hematologist and Medical Oncologist  [facility]      Again, thank you for allowing me to participate in the care of your patient.        Sincerely,        Pavel Norton MD

## 2023-11-12 ENCOUNTER — TELEPHONE (OUTPATIENT)
Dept: FAMILY MEDICINE | Facility: CLINIC | Age: 73
End: 2023-11-12
Payer: COMMERCIAL

## 2023-11-12 DIAGNOSIS — I10 UNCONTROLLED HYPERTENSION: ICD-10-CM

## 2023-11-14 RX ORDER — LISINOPRIL 30 MG/1
30 TABLET ORAL DAILY
Qty: 90 TABLET | Refills: 0 | OUTPATIENT
Start: 2023-11-14

## 2023-11-16 DIAGNOSIS — E66.01 MORBID OBESITY (H): ICD-10-CM

## 2023-11-16 DIAGNOSIS — R10.11 ABDOMINAL PAIN, RIGHT UPPER QUADRANT: ICD-10-CM

## 2023-11-16 DIAGNOSIS — C61 PROSTATE CANCER (H): Primary | ICD-10-CM

## 2023-11-20 NOTE — TELEPHONE ENCOUNTER
Called and left VM to return call to [phone]. If patient returns call please schedule OV per message below. Gala Simmons MA

## 2023-11-24 ENCOUNTER — LAB (OUTPATIENT)
Dept: LAB | Facility: CLINIC | Age: 73
End: 2023-11-24
Payer: COMMERCIAL

## 2023-11-24 ENCOUNTER — ANCILLARY PROCEDURE (OUTPATIENT)
Dept: CT IMAGING | Facility: CLINIC | Age: 73
End: 2023-11-24
Attending: INTERNAL MEDICINE
Payer: COMMERCIAL

## 2023-11-24 DIAGNOSIS — R10.11 ABDOMINAL PAIN, RIGHT UPPER QUADRANT: ICD-10-CM

## 2023-11-24 DIAGNOSIS — C61 PROSTATE CANCER (H): ICD-10-CM

## 2023-11-24 LAB
ALBUMIN SERPL BCG-MCNC: 4.4 G/DL (ref 3.5–5.2)
ALP SERPL-CCNC: 55 U/L (ref 40–150)
ALT SERPL W P-5'-P-CCNC: 24 U/L (ref 0–70)
AMYLASE SERPL-CCNC: 79 U/L (ref 28–100)
ANION GAP SERPL CALCULATED.3IONS-SCNC: 10 MMOL/L (ref 7–15)
AST SERPL W P-5'-P-CCNC: 31 U/L (ref 0–45)
BASOPHILS # BLD AUTO: 0.1 10E3/UL (ref 0–0.2)
BASOPHILS NFR BLD AUTO: 1 %
BILIRUB SERPL-MCNC: 0.4 MG/DL
BUN SERPL-MCNC: 17.9 MG/DL (ref 8–23)
CALCIUM SERPL-MCNC: 9.4 MG/DL (ref 8.8–10.2)
CHLORIDE SERPL-SCNC: 104 MMOL/L (ref 98–107)
CREAT BLD-MCNC: 1.2 MG/DL (ref 0.7–1.3)
CREAT SERPL-MCNC: 1.04 MG/DL (ref 0.67–1.17)
DEPRECATED HCO3 PLAS-SCNC: 24 MMOL/L (ref 22–29)
EGFRCR SERPLBLD CKD-EPI 2021: 76 ML/MIN/1.73M2
EGFRCR SERPLBLD CKD-EPI 2021: >60 ML/MIN/1.73M2
EOSINOPHIL # BLD AUTO: 0.2 10E3/UL (ref 0–0.7)
EOSINOPHIL NFR BLD AUTO: 2 %
ERYTHROCYTE [DISTWIDTH] IN BLOOD BY AUTOMATED COUNT: 13.3 % (ref 10–15)
GGT SERPL-CCNC: 24 U/L (ref 8–61)
GLUCOSE SERPL-MCNC: 113 MG/DL (ref 70–99)
HCT VFR BLD AUTO: 39.7 % (ref 40–53)
HGB BLD-MCNC: 13 G/DL (ref 13.3–17.7)
IMM GRANULOCYTES # BLD: 0 10E3/UL
IMM GRANULOCYTES NFR BLD: 0 %
LDH SERPL L TO P-CCNC: 212 U/L (ref 0–250)
LIPASE SERPL-CCNC: 19 U/L (ref 13–60)
LYMPHOCYTES # BLD AUTO: 2.3 10E3/UL (ref 0.8–5.3)
LYMPHOCYTES NFR BLD AUTO: 32 %
MCH RBC QN AUTO: 32.1 PG (ref 26.5–33)
MCHC RBC AUTO-ENTMCNC: 32.7 G/DL (ref 31.5–36.5)
MCV RBC AUTO: 98 FL (ref 78–100)
MONOCYTES # BLD AUTO: 0.8 10E3/UL (ref 0–1.3)
MONOCYTES NFR BLD AUTO: 11 %
NEUTROPHILS # BLD AUTO: 3.8 10E3/UL (ref 1.6–8.3)
NEUTROPHILS NFR BLD AUTO: 54 %
NRBC # BLD AUTO: 0 10E3/UL
NRBC BLD AUTO-RTO: 0 /100
PLATELET # BLD AUTO: 279 10E3/UL (ref 150–450)
POTASSIUM SERPL-SCNC: 5.2 MMOL/L (ref 3.4–5.3)
PROT SERPL-MCNC: 7.8 G/DL (ref 6.4–8.3)
PSA SERPL DL<=0.01 NG/ML-MCNC: 1.08 NG/ML (ref 0–6.5)
RBC # BLD AUTO: 4.05 10E6/UL (ref 4.4–5.9)
SODIUM SERPL-SCNC: 138 MMOL/L (ref 135–145)
WBC # BLD AUTO: 7.1 10E3/UL (ref 4–11)

## 2023-11-24 PROCEDURE — 74177 CT ABD & PELVIS W/CONTRAST: CPT | Mod: GC | Performed by: RADIOLOGY

## 2023-11-24 PROCEDURE — 83690 ASSAY OF LIPASE: CPT

## 2023-11-24 PROCEDURE — 84403 ASSAY OF TOTAL TESTOSTERONE: CPT

## 2023-11-24 PROCEDURE — 82565 ASSAY OF CREATININE: CPT

## 2023-11-24 PROCEDURE — 80053 COMPREHEN METABOLIC PANEL: CPT

## 2023-11-24 PROCEDURE — 82150 ASSAY OF AMYLASE: CPT

## 2023-11-24 PROCEDURE — 84153 ASSAY OF PSA TOTAL: CPT

## 2023-11-24 PROCEDURE — 83615 LACTATE (LD) (LDH) ENZYME: CPT

## 2023-11-24 PROCEDURE — 36415 COLL VENOUS BLD VENIPUNCTURE: CPT

## 2023-11-24 PROCEDURE — 85025 COMPLETE CBC W/AUTO DIFF WBC: CPT

## 2023-11-24 PROCEDURE — 82977 ASSAY OF GGT: CPT

## 2023-11-24 RX ORDER — IOPAMIDOL 755 MG/ML
122 INJECTION, SOLUTION INTRAVASCULAR ONCE
Status: COMPLETED | OUTPATIENT
Start: 2023-11-24 | End: 2023-11-24

## 2023-11-24 RX ADMIN — IOPAMIDOL 122 ML: 755 INJECTION, SOLUTION INTRAVASCULAR at 13:29

## 2023-11-27 ENCOUNTER — PATIENT OUTREACH (OUTPATIENT)
Dept: ONCOLOGY | Facility: CLINIC | Age: 73
End: 2023-11-27
Payer: COMMERCIAL

## 2023-11-27 DIAGNOSIS — C61 PROSTATE CANCER (H): Primary | ICD-10-CM

## 2023-11-27 NOTE — PROGRESS NOTES
[facility]: Cancer Care Note                                                                                          Patient called into clinic this morning regarding his recent CT results from 11/24/23, which showed the following findings and recommendations:    IMPRESSION:   1. Subcentimeter L5 sclerotic lesion is new since 2015, recommend  followup nuclear medicine bone scan.  2. Prostatectomy without other evidence for recurrence or metastatic  disease.    Patient wondering if it would be possible for Dr. Norton to order the bone scan, so that he can try to get that scheduled soon (he would like to have it completed before his appointment with Dr. Norton on Thursday this week, if possible).      Writer reviewed with Dr. Norton, who agrees with having patient proceed with NM Bone Scan at this time.  MD has placed the order.  Urgent message was sent to our scheduling team, asking for their assistance with arranging the appointment for patient (and notifying patient of appointment details).    Kenan Molina, RN, BSN, OCN  RN Care Coordinator - Oncology  [facility]

## 2023-11-28 ENCOUNTER — ANCILLARY PROCEDURE (OUTPATIENT)
Dept: NUCLEAR MEDICINE | Facility: CLINIC | Age: 73
End: 2023-11-28
Attending: INTERNAL MEDICINE
Payer: COMMERCIAL

## 2023-11-28 DIAGNOSIS — C61 PROSTATE CANCER (H): ICD-10-CM

## 2023-11-28 LAB — TESTOST SERPL-MCNC: 611 NG/DL (ref 240–950)

## 2023-11-28 PROCEDURE — 78306 BONE IMAGING WHOLE BODY: CPT | Performed by: RADIOLOGY

## 2023-11-28 PROCEDURE — A9503 TC99M MEDRONATE: HCPCS | Performed by: RADIOLOGY

## 2023-11-28 RX ORDER — TC 99M MEDRONATE 20 MG/10ML
20-30 INJECTION, POWDER, LYOPHILIZED, FOR SOLUTION INTRAVENOUS ONCE
Status: COMPLETED | OUTPATIENT
Start: 2023-11-28 | End: 2023-11-28

## 2023-11-28 RX ADMIN — TC 99M MEDRONATE 23.52 MILLICURIE: 20 INJECTION, POWDER, LYOPHILIZED, FOR SOLUTION INTRAVENOUS at 09:02

## 2023-11-30 ENCOUNTER — ONCOLOGY VISIT (OUTPATIENT)
Dept: ONCOLOGY | Facility: CLINIC | Age: 73
End: 2023-11-30
Payer: COMMERCIAL

## 2023-11-30 VITALS
BODY MASS INDEX: 38.61 KG/M2 | SYSTOLIC BLOOD PRESSURE: 146 MMHG | HEART RATE: 56 BPM | TEMPERATURE: 98.4 F | DIASTOLIC BLOOD PRESSURE: 80 MMHG | HEIGHT: 67 IN | OXYGEN SATURATION: 96 % | WEIGHT: 246 LBS

## 2023-11-30 DIAGNOSIS — C61 PROSTATE CANCER (H): Primary | ICD-10-CM

## 2023-11-30 DIAGNOSIS — R10.11 ABDOMINAL PAIN, RIGHT UPPER QUADRANT: ICD-10-CM

## 2023-11-30 DIAGNOSIS — E66.01 MORBID OBESITY (H): ICD-10-CM

## 2023-11-30 PROCEDURE — 99214 OFFICE O/P EST MOD 30 MIN: CPT | Performed by: INTERNAL MEDICINE

## 2023-11-30 ASSESSMENT — PAIN SCALES - GENERAL: PAINLEVEL: MILD PAIN (3)

## 2023-11-30 NOTE — Clinical Note
11/30/2023         RE: Boaz Acosta  [address] [address]        Dear Colleague,    Thank you for referring your patient, Boaz Acosta, to the [facility]. Please see a copy of my visit note below.    [facility]  FOLLOW-UP VISIT NOTE    PATIENT NAME: Boaz Acosta MRN # [id number]  DATE OF VISIT: Nov 30, 2023 YOB: 1950    REFERRING PROVIDER: Rocío Boss MD  [facility]  [address] [facility] [place] [address]    CANCER TYPE: Prostate adenocarcinoma  STAGE: IV  ECOG PS: 0    TREATMENT SUMMARY:  Boaz's annual screening PSA was noted to be elevated at 17 in 2012, and 21 in the following year and it was attributed to prostatitis. Eventually, he did change his primary care physician and his repeat PSA was noted to be elevated at 31 in 04/2014. He was then referred to Urology, and was seen by Dr. Pressley with Urology in 07/2014. He was worked up with a biopsy of the prostate on 08/01//2014, which revealed adenocarcinoma of the prostate with [place] score of 4+3 involving 12 of the 12 cores. He was staged with a PET/CT scan, which did not show any evidence of distant metastasis. There was evidence of increased FDG uptake throughout the prostate with a maximum SUV of 7.1. He had a bone scan done on 08/19/2014, which did not show any obvious evidence of metastasis. Mr. Acosta underwent a robotic-assisted radical prostatectomy and lymph node sampling performed by Dr. Hesham Prieto on 10/24/2014. The pathology from this revealed adenocarcinoma of the prostate with a [place] of 7 and a tertiary pattern of 5. There was extensive prostatic tissue involvement with over 75% of the tissue with evidence of disease. There was extensive extraprostatic extension involving the apex, right and left anterior and posterior base, and soft tissue around both seminal vesicles. There was bilateral seminal  vesicle invasion with involvement of vas deferens. Margins were involved with bladder neck and bilateral posterior seminal vesicle soft tissue apex. There was evidence of perineural invasion. The lymph node sample was negative for disease. His postoperative PSA in 12/2014 remained elevated at 7.1, and all the subsequent PSAs have remained positive. His restaging scans done in January and again in 03/2015, including a CT abdomen and pelvis and bone scans, have been negative. He was referred to Radiation Oncology for consideration of salvage radiation therapy, and was seen by Dr. Murphy on 03/24/2015. Dr. Murphy referred him to Dr. Zbigniew Chu at the [facility] to have a choline-11 PET/CT scan done. The choline-11 scan done at [facility] demonstrated evidence of 2 retroperitoneal lymph nodes that are mildly positive for uptake. There was no abnormal uptake in the prostate bed to suggest local recurrence. He was started on bicalutamide, and received his first dose of Lupron on 05/12/2015.   He comes for a scheduled follow up visit     5/12/2015 9/2/2015 1/4/2016 6/20/2019 7/30/2020   leuprolide  IM 30 mg 30 mg 30 mg 30 mg 30 mg      7/15/2021  9:43 AM 1/5/2023  8:57 AM   leuprolide (LUPRON DEPOT) IM 45 mg  45 mg      CURRENT TREATMENT  Treatment period of intermittent androgen deprivation therapy    SUBJECTIVE   Boaz comes for a scheduled follow up visit on androgen ablation therapy.     Boaz was seen in person.  He has been in good health since last visit.      He has been trying to lose weight and has successfully lost some weight.     He is aware of his labs including his PSA. He is quite happy about his PSA.     He has been doing well and denies any other complains.      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    indomethacin (INDOCIN) 50 MG capsule Take 1 capsule (50 mg) by mouth 2 times daily (with meals) Take as needed for Gout.    lisinopril (ZESTRIL) 30 MG tablet Take 1 tablet (30 mg) by mouth daily **Patient needs to be seen for refills**    ORDER FOR DME, SET TO FAX, PATIENT WAS SET UP ON A RESPIRONICS 560 AUTO MACHINE AT [facility] 9CMH2O WITH HEATED HUMIDITY. PATIENT HAD A CHOICE OF MASK AND CHOSE THE AIRFIT P10 SIZE MEDIUM NASAL PILLOW. HE HAS A F/U APPOINTMENT TO HIS SLEEP STUDY 2/5 WITH TURNER GLASER PA-C AND A SECOND ONE SCHEDULED IN 6 WEEKS.    UNABLE TO FIND MEDICATION NAME: mushroom supplement that helps to limit tumor growth    UNABLE TO FIND MEDICATION NAME: Turkey tail supplement    Leuprolide Acetate, 4 Month, (LUPRON DEPOT, 4-MONTH, IM) Inject 45 mg into the muscle  (Patient not taking: Reported on 11/30/2023)    Multiple Vitamin (DAILY MULTIVITAMIN PO) Take 1 tablet by mouth daily.     No current facility-administered medications for this visit.         ALLERGIES     Allergies   Allergen Reactions    Chlorthalidone      Only issue when he is using Lupron- vision disturbances          REVIEW OF SYSTEMS   As above in the HPI, o/w complete 12-point ROS was negative.     PHYSICAL EXAM   BP (!) 146/80   Pulse 56   Temp 98.4  F (36.9  C)   Ht 1.702 m (5' 7\")   Wt 111.6 kg (246 lb)   SpO2 96%   BMI 38.53 kg/m      SpO2 Readings from Last 4 Encounters:   06/18/18 96%   10/30/17 97%   10/16/17 97% " "  06/19/17 96%     Wt Readings from Last 3 Encounters:   11/30/23 111.6 kg (246 lb)   07/20/23 108.7 kg (239 lb 9.6 oz)   01/05/23 107 kg (236 lb)     GEN: NAD  HEENT: PERRL, EOMI, no icterus, injection or pallor. Oropharynx is clear.  NECK: no cervical or supraclavicular lymphadenopathy  LUNGS: clear bilaterally  CV: regular, no murmurs, rubs, or gallops  ABDOMEN: soft, non-tender, non-distended, normal bowel sounds, no hepatosplenomegaly by percussion or palpation  EXT: warm, well perfused, no edema  NEURO: alert  SKIN: lesion in subxiphoid (epigastrium) as below     LABORATORY AND IMAGING STUDIES        Recent Labs   Lab Test 11/24/23  1320 11/24/23  1301 07/03/23  0857 01/02/23  0845 06/30/22  0851 01/03/22  0951   NA  --  138 141 140 139 143   POTASSIUM  --  5.2 4.4 4.2 4.7 4.3   CHLORIDE  --  104 104 109 109 110*   CO2  --  24 26 27 26 29   ANIONGAP  --  10 11 4 4 4   BUN  --  17.9 18.0 14 21 11   CR 1.2 1.04 1.07 0.89 1.01 0.85   GLC  --  113* 135* 102* 111* 120*   KERRY  --  9.4 8.6* 9.3 9.4 8.9     No results for input(s): \"MAG\", \"PHOS\" in the last 23027 hours.  Recent Labs   Lab Test 11/24/23  1301 07/03/23  0857 01/02/23  0845 06/30/22  0851 01/03/22  0951   WBC 7.1 6.9 7.4 7.3 7.0   HGB 13.0* 12.3* 12.8* 12.8* 13.2*    228 237 250 256   MCV 98 94 95 98 100   NEUTROPHIL 54 57 48 56 54     Recent Labs   Lab Test 11/24/23  1301 07/03/23  0857 01/02/23  0845   BILITOTAL 0.4 0.8 0.5   ALKPHOS 55 67 57   ALT 24 21 30   AST 31 31 24   ALBUMIN 4.4 4.3 3.5    203 154     TSH   Date Value Ref Range Status   03/14/2005 2.22 0.4 - 5.0 mU/L Final     No results for input(s): \"CEA\" in the last 25217 hours.  Results for orders placed or performed in visit on 11/28/23   NM Bone Scan Whole Body    Narrative    hhEXAMINATION: NM BONE SCAN WHOLE BODY  Whole-body bone scan, 11/28/2023 9:02 AM     HISTORY: Restaging - bony metastasis; Prostate cancer (H)     ADDITIONAL INFORMATION: Prostate cancer with metastasis " to  retroperitoneal nodes with persistent PSA elevation post  prostatectomy; ish 4+3 disease, ?    COMPARISON: 3/18/2015 dated bone scan.    Correlation: 11/24/2023 CT    TECHNIQUE: The patient received 23.52 mCi of Tc-99m MDP intravenously.  Whole body bone images were obtained at 3 hours.    FINDINGS:     There are multifocal areas of radiotracer uptake along the right  lateral aspect of the thoracic and lumbar spine. On recent CT there  are bridging osteophytes at theses levels. Previously seen uptake in  the upper cervical spine on the left on posterior view is resolved.  Degenerative changes related uptake in the left knee, right ankle,  left midfoot and right base of the first big toe. Mild uptake by  bilateral shoulder joints again likely degenerative.   Physiologic tracer excretion through the kidneys and urinary bladder.      Impression    IMPRESSION: Technetium 99m whole body bone scan demonstrates no focal  suspicious uptake throughout skeleton to suggest osteoblastic  metastasis.     BONNIE QUIROGA MD         SYSTEM ID:  [id number]     Recent Labs   Lab Test 11/24/23  1301 07/03/23  0857 01/02/23  0845 06/30/22  0851 01/03/22  0951   PSA 1.08 0.26 6.12* 0.86 0.17   TESTOSTTOTAL 611 8* 578 638 10*       ASSESSMENT AND PLAN   Prostate cancer with metastasis to retroperitoneal nodes with persistent PSA elevation post prostatectomy; ish 4+3 disease, tertiary score of 5  Elevated LFT's, obesity, elevated blood pressure at this visit  ECOG PS 0  No other medical comorbiditiies    Boaz is alone at this clinic visit.  He has been doing very well since the last visit.  He has no physical complaints at this visit.    I have reviewed all of the labs done prior to this clinic visit.  Labs are all completely normal including electrolytes, renal function, complete blood count and differential except for mild normocytic anemia which has been stable. His hepatic panel which had revealed persistent elevation in his  transaminases has resolved at this visit. Even GGT is normal which too was elevated in past.     He has been making a fair effort to lose weight and has lost some weight. His LFT has normalized since last visit.     He is aware of his PSA values.  His PSA has gone up to 6.12 ng/ml.  His PSA had rapidly rebounded twice when we used 4 month dose of leuprolide. We did have a good 6-12 month off period with a 6 month dose.      I will again see him in 6 months with labs a week prior to visit including CBC, CMP, PSA, testosterone, GGT.      25 minutes spent on the date of the encounter doing chart review, history and exam, documentation and further activities as noted above      Pavel Norton    Hematologist and Medical Oncologist  [facility]        Again, thank you for allowing me to participate in the care of your patient.        Sincerely,        Pavel Norton MD

## 2023-11-30 NOTE — PROGRESS NOTES
[facility]  FOLLOW-UP VISIT NOTE    PATIENT NAME: Boaz Acosta MRN # [id number]  DATE OF VISIT: Nov 30, 2023 YOB: 1950    REFERRING PROVIDER: Rocío Boss MD  [address] [address]    CANCER TYPE: Prostate adenocarcinoma  STAGE: IV  ECOG PS: 0    TREATMENT SUMMARY:  Boaz's annual screening PSA was noted to be elevated at 17 in 2012, and 21 in the following year and it was attributed to prostatitis. Eventually, he did change his primary care physician and his repeat PSA was noted to be elevated at 31 in 04/2014. He was then referred to Urology, and was seen by Dr. Pressley with Urology in 07/2014. He was worked up with a biopsy of the prostate on 08/01//2014, which revealed adenocarcinoma of the prostate with Willis score of 4+3 involving 12 of the 12 cores. He was staged with a PET/CT scan, which did not show any evidence of distant metastasis. There was evidence of increased FDG uptake throughout the prostate with a maximum SUV of 7.1. He had a bone scan done on 08/19/2014, which did not show any obvious evidence of metastasis. Mr. Acosta underwent a robotic-assisted radical prostatectomy and lymph node sampling performed by Dr. Hesham Prieto on 10/24/2014. The pathology from this revealed adenocarcinoma of the prostate with a [place] of 7 and a tertiary pattern of 5. There was extensive prostatic tissue involvement with over 75% of the tissue with evidence of disease. There was extensive extraprostatic extension involving the apex, right and left anterior and posterior base, and soft tissue around both seminal vesicles. There was bilateral seminal vesicle invasion with involvement of vas deferens. Margins were involved with bladder neck and bilateral posterior seminal vesicle soft tissue apex. There was evidence of perineural invasion. The lymph node sample was negative for disease. His postoperative PSA  in 12/2014 remained elevated at 7.1, and all the subsequent PSAs have remained positive. His restaging scans done in January and again in 03/2015, including a CT abdomen and pelvis and bone scans, have been negative. He was referred to Radiation Oncology for consideration of salvage radiation therapy, and was seen by Dr. Murphy on 03/24/2015. Dr. Murphy referred him to Dr. Zbigniew Chu at the [facility] to have a choline-11 PET/CT scan done. The choline-11 scan done at [facility] demonstrated evidence of 2 retroperitoneal lymph nodes that are mildly positive for uptake. There was no abnormal uptake in the prostate bed to suggest local recurrence. He was started on bicalutamide, and received his first dose of Lupron on 05/12/2015.   He comes for a scheduled follow up visit     5/12/2015 9/2/2015 1/4/2016 6/20/2019 7/30/2020   leuprolide  IM 30 mg 30 mg 30 mg 30 mg 30 mg      7/15/2021  9:43 AM 1/5/2023  8:57 AM   leuprolide (LUPRON DEPOT) IM 45 mg  45 mg      CURRENT TREATMENT  Off period of intermittent androgen deprivation therapy    SUBJECTIVE   Boaz comes for a scheduled follow up visit on androgen ablation therapy.     Boaz was seen in person.  He has been in good health since last visit.      He has been trying to lose weight and has successfully lost some weight.     He is aware of his labs including his PSA. He is quite happy about his PSA.     He has been doing well and denies any other complains.      Wt Readings from Last 10 Encounters:   11/30/23 111.6 kg (246 lb)   07/20/23 108.7 kg (239 lb 9.6 oz)   01/05/23 107 kg (236 lb)   10/18/22 113.5 kg (250 lb 3.2 oz)   10/10/22 111.7 kg (246 lb 3.2 oz)   07/07/22 110.1 kg (242 lb 11.2 oz)   01/06/22 110.2 kg (243 lb)   11/08/21 108.4 kg (239 lb)   07/29/21 111.9 kg (246 lb 9.6 oz)   07/15/21 112.9 kg (249 lb)       PAST MEDICAL HISTORY   Prostate cancer with disease metastatic to para-aortic nodes   Borderline HTN  ADALID an CPAP at night  GERD      CURRENT OUTPATIENT  "MEDICATIONS     Current Outpatient Medications   Medication Sig    ascorbic acid (VITAMIN C) 1000 MG TABS Take 1-2 tablets by mouth as needed     diclofenac (VOLTAREN) 1 % topical gel 1 gram to affected areas on feet 2-3 times daily as needed for Gout.    indomethacin (INDOCIN) 50 MG capsule Take 1 capsule (50 mg) by mouth 2 times daily (with meals) Take as needed for Gout.    lisinopril (ZESTRIL) 30 MG tablet Take 1 tablet (30 mg) by mouth daily **Patient needs to be seen for refills**    ORDER FOR DME, SET TO FAX, PATIENT WAS SET UP ON A RESPIRONICS 560 AUTO MACHINE AT [employer] WITH HEATED HUMIDITY. PATIENT HAD A CHOICE OF MASK AND CHOSE THE AIRFIT P10 SIZE MEDIUM NASAL PILLOW. HE HAS A F/U APPOINTMENT TO HIS SLEEP STUDY 2/5 WITH TURNER GLASER PA-C AND A SECOND ONE SCHEDULED IN 6 WEEKS.    UNABLE TO FIND MEDICATION NAME: mushroom supplement that helps to limit tumor growth    UNABLE TO FIND MEDICATION NAME: Turkey tail supplement    Leuprolide Acetate, 4 Month, (LUPRON DEPOT, 4-MONTH, IM) Inject 45 mg into the muscle  (Patient not taking: Reported on 11/30/2023)    Multiple Vitamin (DAILY MULTIVITAMIN PO) Take 1 tablet by mouth daily.     No current facility-administered medications for this visit.         ALLERGIES     Allergies   Allergen Reactions    Chlorthalidone      Only issue when he is using Lupron- vision disturbances          REVIEW OF SYSTEMS   As above in the HPI, o/w complete 12-point ROS was negative.     PHYSICAL EXAM   BP (!) 146/80   Pulse 56   Temp 98.4  F (36.9  C)   Ht 1.702 m (5' 7\")   Wt 111.6 kg (246 lb)   SpO2 96%   BMI 38.53 kg/m      SpO2 Readings from Last 4 Encounters:   06/18/18 96%   10/30/17 97%   10/16/17 97%   06/19/17 96%     Wt Readings from Last 3 Encounters:   11/30/23 111.6 kg (246 lb)   07/20/23 108.7 kg (239 lb 9.6 oz)   01/05/23 107 kg (236 lb)     GEN: NAD  HEENT: PERRL, EOMI, no icterus, injection or pallor. Oropharynx is clear.  NECK: no cervical or " "supraclavicular lymphadenopathy  LUNGS: clear bilaterally  CV: regular, no murmurs, rubs, or gallops  ABDOMEN: soft, non-tender, non-distended, normal bowel sounds, no hepatosplenomegaly by percussion or palpation  EXT: warm, well perfused, no edema  NEURO: alert  SKIN: lesion in subxiphoid (epigastrium) as below     LABORATORY AND IMAGING STUDIES        Recent Labs   Lab Test 11/24/23  1320 11/24/23  1301 07/03/23  0857 01/02/23  0845 06/30/22  0851 01/03/22  0951   NA  --  138 141 140 139 143   POTASSIUM  --  5.2 4.4 4.2 4.7 4.3   CHLORIDE  --  104 104 109 109 110*   CO2  --  24 26 27 26 29   ANIONGAP  --  10 11 4 4 4   BUN  --  17.9 18.0 14 21 11   CR 1.2 1.04 1.07 0.89 1.01 0.85   GLC  --  113* 135* 102* 111* 120*   KERRY  --  9.4 8.6* 9.3 9.4 8.9     No results for input(s): \"MAG\", \"PHOS\" in the last 64798 hours.  Recent Labs   Lab Test 11/24/23  1301 07/03/23  0857 01/02/23  0845 06/30/22  0851 01/03/22  0951   WBC 7.1 6.9 7.4 7.3 7.0   HGB 13.0* 12.3* 12.8* 12.8* 13.2*    228 237 250 256   MCV 98 94 95 98 100   NEUTROPHIL 54 57 48 56 54     Recent Labs   Lab Test 11/24/23  1301 07/03/23  0857 01/02/23  0845   BILITOTAL 0.4 0.8 0.5   ALKPHOS 55 67 57   ALT 24 21 30   AST 31 31 24   ALBUMIN 4.4 4.3 3.5    203 154     TSH   Date Value Ref Range Status   03/14/2005 2.22 0.4 - 5.0 mU/L Final     No results for input(s): \"CEA\" in the last 87390 hours.  Results for orders placed or performed in visit on 11/28/23   NM Bone Scan Whole Body    Narrative    hhEXAMINATION: NM BONE SCAN WHOLE BODY  Whole-body bone scan, 11/28/2023 9:02 AM     HISTORY: Restaging - bony metastasis; Prostate cancer (H)     ADDITIONAL INFORMATION: Prostate cancer with metastasis to  retroperitoneal nodes with persistent PSA elevation post  prostatectomy; ish 4+3 disease, ?    COMPARISON: 3/18/2015 dated bone scan.    Correlation: 11/24/2023 CT    TECHNIQUE: The patient received 23.52 mCi of Tc-99m MDP intravenously.  Whole " body bone images were obtained at 3 hours.    FINDINGS:     There are multifocal areas of radiotracer uptake along the right  lateral aspect of the thoracic and lumbar spine. On recent CT there  are bridging osteophytes at theses levels. Previously seen uptake in  the upper cervical spine on the left on posterior view is resolved.  Degenerative changes related uptake in the left knee, right ankle,  left midfoot and right base of the first big toe. Mild uptake by  bilateral shoulder joints again likely degenerative.   Physiologic tracer excretion through the kidneys and urinary bladder.      Impression    IMPRESSION: Technetium 99m whole body bone scan demonstrates no focal  suspicious uptake throughout skeleton to suggest osteoblastic  metastasis.     BONNIE QUIROGA MD         SYSTEM ID:  [id number]     Recent Labs   Lab Test 11/24/23  1301 07/03/23  0857 01/02/23  0845 06/30/22  0851 01/03/22  0951   PSA 1.08 0.26 6.12* 0.86 0.17   TESTOSTTOTAL 611 8* 578 638 10*       ASSESSMENT AND PLAN   Prostate cancer with metastasis to retroperitoneal nodes with persistent PSA elevation post prostatectomy; ish 4+3 disease, tertiary score of 5  Elevated LFT's, obesity, elevated blood pressure at this visit  ECOG PS 0  No other medical comorbiditiies    Boaz is alone at this clinic visit.  He has been doing very well since the last visit.  He has no physical complaints at this visit.    I have reviewed all of the labs done prior to this clinic visit.  Labs are all completely normal including electrolytes, renal function, complete blood count and differential except for mild normocytic anemia which has been stable. His hepatic panel which had revealed persistent elevation in his transaminases has resolved at this visit. Even GGT is normal which too was elevated in past.     He has been making a fair effort to lose weight and has lost some weight. His LFT has normalized since last visit.     He is aware of his PSA values.  His  PSA has gone up to 1.08 ng/ml.  His PSA had rapidly rebounded twice when we used 4 month dose of leuprolide. We did have a good 6-12 month off period with a 6 month dose.      I will again see him in 6 months with labs a week prior to visit including CBC, CMP, PSA, testosterone. He would likely need leuprolide after next visit.       30 minutes spent on the date of the encounter doing chart review, history and exam, documentation and further activities as noted above      Pavel Norton    Hematologist and Medical Oncologist  [facility]

## 2023-11-30 NOTE — NURSING NOTE
"Oncology Rooming Note    November 30, 2023 2:58 PM   Boaz Acosta is a 73 year old male who presents for:    Chief Complaint   Patient presents with    Oncology Clinic Visit     Follow up     Initial Vitals: Pulse 56   Temp 98.4  F (36.9  C)   Ht 1.702 m (5' 7\")   Wt 111.6 kg (246 lb)   SpO2 96%   BMI 38.53 kg/m   Estimated body mass index is 38.53 kg/m  as calculated from the following:    Height as of this encounter: 1.702 m (5' 7\").    Weight as of this encounter: 111.6 kg (246 lb). Body surface area is 2.3 meters squared.  Mild Pain (3) Comment: Back and hip pain right side   No LMP for male patient.  Allergies reviewed: Yes  Medications reviewed: Yes    Medications: Medication refills not needed today.  Pharmacy name entered into [employer]: [id number] [place] [facility] [address] [facility]    Clinical concerns: No Concerns        Tommie Pal MA            "

## 2023-12-09 DIAGNOSIS — I10 UNCONTROLLED HYPERTENSION: ICD-10-CM

## 2023-12-11 RX ORDER — LISINOPRIL 30 MG/1
30 TABLET ORAL DAILY
Qty: 90 TABLET | Refills: 0 | Status: SHIPPED | OUTPATIENT
Start: 2023-12-11 | End: 2024-03-18

## 2023-12-14 ENCOUNTER — OFFICE VISIT (OUTPATIENT)
Dept: FAMILY MEDICINE | Facility: CLINIC | Age: 73
End: 2023-12-14
Payer: COMMERCIAL

## 2023-12-14 VITALS
OXYGEN SATURATION: 97 % | HEIGHT: 68 IN | TEMPERATURE: 97.9 F | RESPIRATION RATE: 16 BRPM | HEART RATE: 57 BPM | WEIGHT: 253.8 LBS | BODY MASS INDEX: 38.46 KG/M2 | SYSTOLIC BLOOD PRESSURE: 168 MMHG | DIASTOLIC BLOOD PRESSURE: 74 MMHG

## 2023-12-14 DIAGNOSIS — M10.9 ACUTE GOUT OF RIGHT ANKLE, UNSPECIFIED CAUSE: ICD-10-CM

## 2023-12-14 DIAGNOSIS — R73.9 HYPERGLYCEMIA: ICD-10-CM

## 2023-12-14 DIAGNOSIS — E66.01 MORBID OBESITY (H): Primary | ICD-10-CM

## 2023-12-14 DIAGNOSIS — I10 HYPERTENSION GOAL BP (BLOOD PRESSURE) < 140/90: Chronic | ICD-10-CM

## 2023-12-14 LAB
CHOLEST SERPL-MCNC: 140 MG/DL
FASTING STATUS PATIENT QL REPORTED: YES
HBA1C MFR BLD: 5.3 % (ref 0–5.6)
HDLC SERPL-MCNC: 83 MG/DL
LDLC SERPL CALC-MCNC: 47 MG/DL
NONHDLC SERPL-MCNC: 57 MG/DL
TRIGL SERPL-MCNC: 48 MG/DL

## 2023-12-14 PROCEDURE — 80061 LIPID PANEL: CPT | Performed by: FAMILY MEDICINE

## 2023-12-14 PROCEDURE — 83036 HEMOGLOBIN GLYCOSYLATED A1C: CPT | Mod: GZ | Performed by: FAMILY MEDICINE

## 2023-12-14 PROCEDURE — 36415 COLL VENOUS BLD VENIPUNCTURE: CPT | Performed by: FAMILY MEDICINE

## 2023-12-14 PROCEDURE — 99214 OFFICE O/P EST MOD 30 MIN: CPT | Performed by: FAMILY MEDICINE

## 2023-12-14 RX ORDER — RESPIRATORY SYNCYTIAL VIRUS VACCINE 120MCG/0.5
0.5 KIT INTRAMUSCULAR ONCE
Qty: 1 EACH | Refills: 0 | Status: CANCELLED | OUTPATIENT
Start: 2023-12-14 | End: 2023-12-14

## 2023-12-14 RX ORDER — INDOMETHACIN 50 MG/1
50 CAPSULE ORAL 2 TIMES DAILY WITH MEALS
Qty: 60 CAPSULE | Refills: 11 | Status: SHIPPED | OUTPATIENT
Start: 2023-12-14 | End: 2024-09-10

## 2023-12-14 NOTE — PROGRESS NOTES
"  Assessment & Plan       ICD-10-CM    1. Obesity (BMI 35.0-39.9) with comorbidity (H)  E66.01 semaglutide (OZEMPIC) 2 MG/3ML pen     Hemoglobin A1c     Lipid panel reflex to direct LDL Non-fasting     PRIMARY CARE FOLLOW-UP SCHEDULING     Hemoglobin A1c     Lipid panel reflex to direct LDL Non-fasting     DISCONTINUED: tirzepatide (MOUNJARO) 2.5 MG/0.5ML pen      2. Hypertension goal BP (blood pressure) < 140/90  I10 Hemoglobin A1c     Lipid panel reflex to direct LDL Non-fasting     PRIMARY CARE FOLLOW-UP SCHEDULING     Hemoglobin A1c     Lipid panel reflex to direct LDL Non-fasting      3. Acute gout of right ankle, unspecified cause  M10.9 indomethacin (INDOCIN) 50 MG capsule      4. Hyperglycemia  R73.9 Hemoglobin A1c     Hemoglobin A1c            Review of external notes as documented elsewhere in note         BMI:   Estimated body mass index is 38.59 kg/m  as calculated from the following:    Height as of this encounter: 1.727 m (5' 8\").    Weight as of this encounter: 115.1 kg (253 lb 12.8 oz).   Weight management plan: Discussed healthy diet and exercise guidelines    There are no Patient Instructions on file for this visit.    Gianluca Singh MD  [facility] GUZMAN Sandra is a 73 year old, presenting for the following health issues:  Refill Request and Weight Problem      12/14/2023     7:17 AM   Additional Questions   Roomed by Gala   Accompanied by samson         12/14/2023     7:17 AM   Patient Reported Additional Medications   Patient reports taking the following new medications none       History of Present Illness       Hypertension: He presents for follow up of hypertension.  He does check blood pressure  regularly outside of the clinic. Outside blood pressures have been over 140/90. He follows a low salt diet.     He eats 4 or more servings of fruits and vegetables daily.He consumes 1 sweetened beverage(s) daily.He exercises with enough effort to increase his heart " "rate 30 to 60 minutes per day.  He exercises with enough effort to increase his heart rate 5 days per week.   He is taking medications regularly.     Mid-lower back pain  Bone spurs on right side  Does PT exercises daily    Weight gain  20lb over the lat 12 months  Tried noom for 1 year  Would like to try weight loss medications    Hypertension  Good control at home (130's/80's)    Wt Readings from Last 4 Encounters:   12/14/23 115.1 kg (253 lb 12.8 oz)   11/30/23 111.6 kg (246 lb)   07/20/23 108.7 kg (239 lb 9.6 oz)   01/05/23 107 kg (236 lb)     BP Readings from Last 6 Encounters:   12/14/23 (!) 174/72   11/30/23 (!) 146/80   07/20/23 138/74   01/05/23 (!) 150/88   10/18/22 (!) 186/93   10/10/22 (!) 142/80                 Review of Systems   Constitutional, HEENT, cardiovascular, pulmonary, gi and gu systems are negative, except as otherwise noted.      Objective    BP (!) 174/72   Pulse 57   Temp 97.9  F (36.6  C) (Oral)   Resp 16   Ht 1.727 m (5' 8\")   Wt 115.1 kg (253 lb 12.8 oz)   SpO2 97%   BMI 38.59 kg/m    Body mass index is 38.59 kg/m .  Physical Exam  Constitutional:       General: He is not in acute distress.     Appearance: Normal appearance. He is well-developed. He is not ill-appearing.   HENT:      Head: Normocephalic and atraumatic.      Right Ear: External ear normal.      Left Ear: External ear normal.      Nose: Nose normal.   Eyes:      General: No scleral icterus.     Extraocular Movements: Extraocular movements intact.      Conjunctiva/sclera: Conjunctivae normal.   Cardiovascular:      Rate and Rhythm: Normal rate.   Pulmonary:      Effort: Pulmonary effort is normal.   Musculoskeletal:      Cervical back: Normal range of motion and neck supple.   Skin:     General: Skin is warm and dry.   Neurological:      Mental Status: He is alert and oriented to person, place, and time.   Psychiatric:         Behavior: Behavior normal.         Thought Content: Thought content normal.         " Judgment: Judgment normal.

## 2024-03-15 DIAGNOSIS — I10 UNCONTROLLED HYPERTENSION: ICD-10-CM

## 2024-03-18 RX ORDER — LISINOPRIL 30 MG/1
30 TABLET ORAL DAILY
Qty: 90 TABLET | Refills: 1 | Status: SHIPPED | OUTPATIENT
Start: 2024-03-18 | End: 2024-05-04

## 2024-03-25 ENCOUNTER — THERAPY VISIT (OUTPATIENT)
Dept: PHYSICAL THERAPY | Facility: CLINIC | Age: 74
End: 2024-03-25
Attending: FAMILY MEDICINE
Payer: COMMERCIAL

## 2024-03-25 DIAGNOSIS — M54.50 CHRONIC RIGHT-SIDED LOW BACK PAIN WITHOUT SCIATICA: Primary | ICD-10-CM

## 2024-03-25 DIAGNOSIS — M54.50 CHRONIC LOW BACK PAIN WITHOUT SCIATICA, UNSPECIFIED BACK PAIN LATERALITY: ICD-10-CM

## 2024-03-25 DIAGNOSIS — G89.29 CHRONIC LOW BACK PAIN WITHOUT SCIATICA, UNSPECIFIED BACK PAIN LATERALITY: ICD-10-CM

## 2024-03-25 DIAGNOSIS — G89.29 CHRONIC RIGHT-SIDED LOW BACK PAIN WITHOUT SCIATICA: Primary | ICD-10-CM

## 2024-03-25 PROCEDURE — 97110 THERAPEUTIC EXERCISES: CPT | Mod: GP | Performed by: PHYSICAL THERAPIST

## 2024-03-25 PROCEDURE — 97161 PT EVAL LOW COMPLEX 20 MIN: CPT | Mod: GP | Performed by: PHYSICAL THERAPIST

## 2024-03-25 NOTE — PROGRESS NOTES
PHYSICAL THERAPY EVALUATION  Type of Visit: Evaluation    See electronic medical record for Abuse and Falls Screening details.    Subjective       Presenting condition or subjective complaint: constant discomfort at right lower pelvic area radiates laterally. Also, right lateral discomfort in thoracic right region. both increase with moderate activity. Things like climbing, constant bending aggravate and increase pain.  Date of onset: 03/19/24    Relevant medical history: Arthritis; Cancer; High blood pressure; Neck injury; Overweight; Sleep disorder like apnea   Dates & types of surgery: numerous. last was radical prostatectomy on 10/14/2014    Prior diagnostic imaging/testing results: CT scan; Bone scan     Prior therapy history for the same diagnosis, illness or injury: Yes chiropractic recently    Prior Level of Function  Transfers: Independent  Ambulation: Independent  ADL: Independent  IADL: Driving, Finances, Housekeeping, Laundry, Meal preparation, Yard work    Living Environment  Social support: Alone   Type of home: House; Multi-level   Stairs to enter the home: No       Ramp: No   Stairs inside the home: Yes 19 Is there a railing: Yes   Help at home: None  Equipment owned:       Employment: No    Hobbies/Interests: hunting, fishing, ping pong, swimming etc    Patient goals for therapy: all activities without discomfort or pain! Ha!    Pain assessment: See objective evaluation for additional pain details     Objective   LUMBAR SPINE EVALUATION  PAIN: Pain Level at Rest: 0/10  Pain Level with Use: 6/10  Pain Location: thoracic spine and lumbar spine  Pain Quality: Aching and Stabbing  Pain Frequency: daily  Pain is Worst: daytime or nighttime  Pain is Exacerbated By: wrong movements; lifting and bending; bending over to don/doff clothing LE  INTEGUMENTARY (edema, incisions):   POSTURE: Standing Posture: Rounded shoulders, Forward head, Lordosis decreased  GAIT:   Weightbearing Status: WBAT  Assistive  Device(s): None  Gait Deviations: WNL  BALANCE/PROPRIOCEPTION:   WEIGHTBEARING ALIGNMENT:   NON-WEIGHTBEARING ALIGNMENT:    ROM:   (Degrees) Left AROM Left PROM  Right AROM Right PROM   Hip Flexion       Hip Extension       Hip Abduction       Hip Adduction       Hip Internal Rotation       Hip External Rotation       Knee Flexion       Knee Extension       Lumbar Side glide WNL WNL   Lumbar Flexion Toes toes     Lumbar Extension Min loss     PELVIC/SI SCREEN:   STRENGTH:     MYOTOMES:    Left Right   T12-L3 (Hip Flexion) 4 5   L2-4 (Quads)  4+ 5   L4 (Ankle DF) 5 5   L5 (Great Toe Ext)     S1 (Toe Raise)       DTR S:   CORD SIGNS:   DERMATOMES: WNL  NEURAL TENSION:   FLEXIBILITY:   LUMBAR/HIP Special Tests:    PELVIS/SI SPECIAL TESTS:   FUNCTIONAL TESTS:   PALPATION:   + Tenderness At Location Left Right   Quadratus Lumborum - +   Erector Spinae - +   Piriformis  - +   PSIS     ASIS     Iliac Crest     Glut Medius     Greater Trochanter     Ischial Tuberosity     Hamstrings     Hip Flexors     Thoracic paraspinals - +     SPINAL SEGMENTAL CONCLUSIONS:       Assessment & Plan   CLINICAL IMPRESSIONS  Medical Diagnosis: Chronic low back pain without sciatica, unspecified back pain laterality    Treatment Diagnosis: Chronic LBP   Impression/Assessment: Patient is a 73 year old male with Chronic LBP complaints.  The following significant findings have been identified: Pain, Decreased ROM/flexibility, Decreased strength, Impaired muscle performance, Decreased activity tolerance, and Impaired posture. These impairments interfere with their ability to perform self care tasks, recreational activities, and household chores as compared to previous level of function.     Clinical Decision Making (Complexity):  Clinical Presentation: Stable/Uncomplicated  Clinical Presentation Rationale: based on medical and personal factors listed in PT evaluation  Clinical Decision Making (Complexity): Low complexity    PLAN OF  CARE  Treatment Interventions:  Interventions: Manual Therapy, Neuromuscular Re-education, Therapeutic Activity, Therapeutic Exercise    Long Term Goals     PT Goal 1  Goal Identifier: (P) dressing  Goal Description: (P) able to don/doff shoes/socks/LE garments without LBP  Rationale: (P) to maximize safety and independence with performance of ADLs and functional tasks;to maximize safety and independence with self cares;to maximize safety and independence within the home  Goal Progress: (P) 6/10PL when attempting to put pants or shoes/socks on R LE when standing  Target Date: (P) 05/20/24      Frequency of Treatment: 1x/week  Duration of Treatment: x8 weeks    Recommended Referrals to Other Professionals: Physical Therapy  Education Assessment:   Learner/Method: Demonstration;Pictures/Video    Risks and benefits of evaluation/treatment have been explained.   Patient/Family/caregiver agrees with Plan of Care.     Evaluation Time:     PT Eval, Low Complexity Minutes (85047): 18       Signing Clinician: Yumiko Reich, RYLEE      [facility]                                                                                   OUTPATIENT PHYSICAL THERAPY      PLAN OF TREATMENT FOR OUTPATIENT REHABILITATION   Patient's Last Name, First Name, Boaz Chamberlain YOB: 1950   Provider's Name   [facility]   Medical Record No.  [id number]     Onset Date: 03/19/24  Start of Care Date: 03/25/24     Medical Diagnosis:  Chronic low back pain without sciatica, unspecified back pain laterality      PT Treatment Diagnosis:  Chronic LBP Plan of Treatment  Frequency/Duration: 1x/week/ x8 weeks    Certification date from 03/25/24 to 05/20/24         See note for plan of treatment details and functional goals     Yumiko Reich, RYLEE                         I CERTIFY THE NEED FOR THESE SERVICES FURNISHED UNDER        THIS PLAN OF TREATMENT AND WHILE UNDER MY CARE      (Physician attestation of this document indicates review and certification of the therapy plan).              Referring Provider:  Gianluca Powell MD    Initial Assessment  See Epic Evaluation- Start of Care Date: 03/25/24

## 2024-04-01 ENCOUNTER — THERAPY VISIT (OUTPATIENT)
Dept: PHYSICAL THERAPY | Facility: CLINIC | Age: 74
End: 2024-04-01
Attending: FAMILY MEDICINE
Payer: COMMERCIAL

## 2024-04-01 DIAGNOSIS — G89.29 CHRONIC RIGHT-SIDED LOW BACK PAIN WITHOUT SCIATICA: Primary | ICD-10-CM

## 2024-04-01 DIAGNOSIS — M54.50 CHRONIC RIGHT-SIDED LOW BACK PAIN WITHOUT SCIATICA: Primary | ICD-10-CM

## 2024-04-01 PROCEDURE — 97140 MANUAL THERAPY 1/> REGIONS: CPT | Mod: GP | Performed by: PHYSICAL THERAPIST

## 2024-04-01 PROCEDURE — 97110 THERAPEUTIC EXERCISES: CPT | Mod: GP | Performed by: PHYSICAL THERAPIST

## 2024-04-08 ENCOUNTER — THERAPY VISIT (OUTPATIENT)
Dept: PHYSICAL THERAPY | Facility: CLINIC | Age: 74
End: 2024-04-08
Attending: FAMILY MEDICINE
Payer: COMMERCIAL

## 2024-04-08 DIAGNOSIS — M54.50 CHRONIC RIGHT-SIDED LOW BACK PAIN WITHOUT SCIATICA: Primary | ICD-10-CM

## 2024-04-08 DIAGNOSIS — G89.29 CHRONIC RIGHT-SIDED LOW BACK PAIN WITHOUT SCIATICA: Primary | ICD-10-CM

## 2024-04-08 PROCEDURE — 97140 MANUAL THERAPY 1/> REGIONS: CPT | Mod: GP | Performed by: PHYSICAL THERAPIST

## 2024-05-04 ENCOUNTER — OFFICE VISIT (OUTPATIENT)
Dept: URGENT CARE | Facility: URGENT CARE | Age: 74
End: 2024-05-04
Payer: COMMERCIAL

## 2024-05-04 VITALS
RESPIRATION RATE: 20 BRPM | BODY MASS INDEX: 37.03 KG/M2 | OXYGEN SATURATION: 95 % | HEART RATE: 68 BPM | DIASTOLIC BLOOD PRESSURE: 91 MMHG | WEIGHT: 243.56 LBS | TEMPERATURE: 99.2 F | SYSTOLIC BLOOD PRESSURE: 155 MMHG

## 2024-05-04 DIAGNOSIS — S30.861A TICK BITE OF GROIN, INITIAL ENCOUNTER: Primary | ICD-10-CM

## 2024-05-04 DIAGNOSIS — I10 ELEVATED BLOOD PRESSURE READING IN OFFICE WITH DIAGNOSIS OF HYPERTENSION: ICD-10-CM
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
## 2024-05-04 PROCEDURE — 99214 OFFICE O/P EST MOD 30 MIN: CPT | Performed by: PHYSICIAN ASSISTANT

## 2024-05-04 RX ORDER — DOXYCYCLINE 100 MG/1
100 CAPSULE ORAL 2 TIMES DAILY
Qty: 20 CAPSULE | Refills: 0 | Status: SHIPPED | OUTPATIENT
Start: 2024-05-04 | End: 2024-05-14

## 2024-05-04 RX ORDER — SODIUM FLUORIDE1.1%, POTASSIUM NITRATE 5% 5.8; 57.5 MG/ML; MG/ML
GEL, DENTIFRICE DENTAL
COMMUNITY
Start: 2023-07-27

## 2024-05-04 NOTE — NURSING NOTE
Of note, the patient has not taken his blood pressure meds since Monday of this week because he did not want to be coughing during turkey hunting. He has an upcoming appointment to discuss the coughing to see if there are alternatives.  Ludy Harrell, CMA

## 2024-05-04 NOTE — PROGRESS NOTES
Chief Complaint   Patient presents with    Urgent Care     Urgent care visit for deer tick bite.    Tick Bite     The patient was bitten by a deer tick that embedded a few days ago. He has had Lyme's Disease twice and would like to get preventative antibiotics. He states that he was bitten in an area where they have had lots of infections from the deer tick bites. He was turkey hunting when he got the bite.    Medication Question     The patient coughs while taking Lisinopril. It is disruptive to him, so he would like to know what alternatives exist.            ASSESSMENT:    ICD-10-CM    1. Tick bite of groin, initial encounter  S30.861A doxycycline hyclate (VIBRAMYCIN) 100 MG capsule    W57.XXXA       2. Elevated blood pressure reading in office with diagnosis of hypertension  I10           PLAN: With significant history of Lyme's in the past I am going to cover with doxycycline twice daily for 10 days.  Deer tick was on for at least 18 hours.  Watch for bull's-eye rash.  See today's orders.  Follow-up with primary clinic if not improving.  Advised about symptoms which might herald more serious problems.    Elevated blood pressure here today.  On lisinopril for hypertension.  His provider is going to remove lisinopril in the near future due to chronic cough.  Monitor blood pressure outside of clinic and follow-up with primary care provider.    Jazmyn Samuel PA-C         SUBJECTIVE:  73 year old male presents with right groin tick bite that he noted 2 days ago.  Was embedded, removed it.  Some swelling in the area but has improved.  Was turkey hunting in [facility].  Tick was on for at least 18 hours.  He is extremely concerned as he has had 2 bouts with Lyme's disease.  The initial time was very ill with 104 temp with joint pains and had  to have IV antibiotics.               Allergies   Allergen Reactions    Chlorthalidone      Only issue when he is using Lupron- vision disturbances         Past  Medical History:   Diagnosis Date    Elevated prostate specific antigen (PSA) 5/30/2014    History of blood transfusion     Hypertension several years    mainly with mechanical testing    Idiopathic chronic 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 9/25/2019    Lyme disease     2 times    Prostate cancer (H) 10/24/2014    Prostate resection    SCC (squamous cell carcinoma), face 12/12/2012       Current Outpatient Medications   Medication Sig Dispense Refill    ascorbic acid (VITAMIN C) 1000 MG TABS Take 1-2 tablets by mouth as needed       Leuprolide Acetate, 4 Month, (LUPRON DEPOT, 4-MONTH, IM) Inject 45 mg into the muscle      lisinopril (ZESTRIL) 30 MG tablet TAKE 1 TABLET BY MOUTH EVERY DAY 90 tablet 1    ORDER FOR DME, SET TO FAX, PATIENT WAS SET UP ON A [employer] 560 AUTO MACHINE AT [employer] WITH HEATED HUMIDITY. PATIENT HAD A CHOICE OF MASK AND CHOSE THE AIRFIT P10 SIZE MEDIUM NASAL PILLOW. HE HAS A F/U APPOINTMENT TO HIS SLEEP STUDY 2/5 WITH TURNER GLASER PA-C AND A SECOND ONE SCHEDULED IN 6 WEEKS.      SODIUM FLUORIDE 5000 SENSITIVE 1.1-5 % GEL BRUSH WITH PASTE UP TO 2 TIMES DAILY FOR 2 MINUTES, THEN SPIT AND RINSE      UNABLE TO FIND MEDICATION NAME: mushroom supplement that helps to limit tumor growth      UNABLE TO FIND MEDICATION NAME: Turkey tail supplement      diclofenac (VOLTAREN) 1 % topical gel 1 gram to affected areas on feet 2-3 times daily as needed for Gout. 100 g 3    indomethacin (INDOCIN) 50 MG capsule Take 1 capsule (50 mg) by mouth 2 times daily (with meals) Take as needed for Gout. (Patient not taking: Reported on 5/4/2024) 60 capsule 11    Multiple Vitamin (DAILY MULTIVITAMIN PO) Take 1 tablet by mouth daily.      semaglutide (OZEMPIC) 2 MG/3ML pen Inject 0.25 mg Subcutaneous every 7 days 3 mL 2     No current facility-administered medications for this visit.       Social History     Tobacco Use    Smoking status: Former     Types: Cigars     Passive exposure: Past (Dad smoked while  growing up and everyone smoked everywhere at the time)    Smokeless tobacco: Never    Tobacco comments:     Only occassional cigars   Vaping Use    Vaping status: Never Used   Substance Use Topics    Alcohol use: Yes     Comment: 1-2 beers or a glass of wine per day    Drug use: No       ROS:  General: negative for fever  SKIN: + as above      Physcial Exam:  BP (!) 155/91 (BP Location: Left arm, Patient Position: Sitting, Cuff Size: Adult Large)   Pulse 68   Temp 99.2  F (37.3  C) (Tympanic)   Resp 20   Wt 110.5 kg (243 lb 9 oz)   SpO2 95%   BMI 37.03 kg/m      GENERAL: alert, no acute distress  EYES: conjunctival clear  RESP: Regular breathing rate  NEURO: awake .  SKIN: Between scrotum and penis on the right he has a red papular lesion.  No bull's-eye rash.    AUSTIN BranhamC

## 2024-06-01 ENCOUNTER — HEALTH MAINTENANCE LETTER (OUTPATIENT)
Age: 74
End: 2024-06-01

## 2024-06-17 ENCOUNTER — LAB (OUTPATIENT)
Dept: INFUSION THERAPY | Facility: CLINIC | Age: 74
End: 2024-06-17
Attending: INTERNAL MEDICINE
Payer: COMMERCIAL

## 2024-06-17 DIAGNOSIS — C61 PROSTATE CANCER (H): ICD-10-CM

## 2024-06-17 DIAGNOSIS — E66.01 MORBID OBESITY (H): ICD-10-CM

## 2024-06-17 DIAGNOSIS — R10.11 ABDOMINAL PAIN, RIGHT UPPER QUADRANT: ICD-10-CM

## 2024-06-17 LAB
ALBUMIN SERPL BCG-MCNC: 4.2 G/DL (ref 3.5–5.2)
ALP SERPL-CCNC: 72 U/L (ref 40–150)
ALT SERPL W P-5'-P-CCNC: 17 U/L (ref 0–70)
ANION GAP SERPL CALCULATED.3IONS-SCNC: 8 MMOL/L (ref 7–15)
AST SERPL W P-5'-P-CCNC: 23 U/L (ref 0–45)
BASOPHILS # BLD AUTO: 0.1 10E3/UL (ref 0–0.2)
BASOPHILS NFR BLD AUTO: 1 %
BILIRUB SERPL-MCNC: 0.8 MG/DL
BUN SERPL-MCNC: 9.5 MG/DL (ref 8–23)
CALCIUM SERPL-MCNC: 9.4 MG/DL (ref 8.8–10.2)
CHLORIDE SERPL-SCNC: 102 MMOL/L (ref 98–107)
CREAT SERPL-MCNC: 1.09 MG/DL (ref 0.67–1.17)
DEPRECATED HCO3 PLAS-SCNC: 27 MMOL/L (ref 22–29)
EGFRCR SERPLBLD CKD-EPI 2021: 72 ML/MIN/1.73M2
EOSINOPHIL # BLD AUTO: 0.2 10E3/UL (ref 0–0.7)
EOSINOPHIL NFR BLD AUTO: 2 %
ERYTHROCYTE [DISTWIDTH] IN BLOOD BY AUTOMATED COUNT: 13.4 % (ref 10–15)
GLUCOSE SERPL-MCNC: 117 MG/DL (ref 70–99)
HCT VFR BLD AUTO: 41.2 % (ref 40–53)
HGB BLD-MCNC: 13.7 G/DL (ref 13.3–17.7)
IMM GRANULOCYTES # BLD: 0 10E3/UL
IMM GRANULOCYTES NFR BLD: 0 %
LYMPHOCYTES # BLD AUTO: 2.6 10E3/UL (ref 0.8–5.3)
LYMPHOCYTES NFR BLD AUTO: 36 %
MCH RBC QN AUTO: 32.2 PG (ref 26.5–33)
MCHC RBC AUTO-ENTMCNC: 33.3 G/DL (ref 31.5–36.5)
MCV RBC AUTO: 97 FL (ref 78–100)
MONOCYTES # BLD AUTO: 0.8 10E3/UL (ref 0–1.3)
MONOCYTES NFR BLD AUTO: 11 %
NEUTROPHILS # BLD AUTO: 3.5 10E3/UL (ref 1.6–8.3)
NEUTROPHILS NFR BLD AUTO: 50 %
NRBC # BLD AUTO: 0 10E3/UL
NRBC BLD AUTO-RTO: 0 /100
PLATELET # BLD AUTO: 269 10E3/UL (ref 150–450)
POTASSIUM SERPL-SCNC: 4.9 MMOL/L (ref 3.4–5.3)
PROT SERPL-MCNC: 7.6 G/DL (ref 6.4–8.3)
PSA SERPL DL<=0.01 NG/ML-MCNC: 16 NG/ML (ref 0–6.5)
RBC # BLD AUTO: 4.25 10E6/UL (ref 4.4–5.9)
SODIUM SERPL-SCNC: 137 MMOL/L (ref 135–145)
WBC # BLD AUTO: 7.1 10E3/UL (ref 4–11)

## 2024-06-17 PROCEDURE — 84403 ASSAY OF TOTAL TESTOSTERONE: CPT

## 2024-06-17 PROCEDURE — 36415 COLL VENOUS BLD VENIPUNCTURE: CPT

## 2024-06-17 PROCEDURE — 85025 COMPLETE CBC W/AUTO DIFF WBC: CPT

## 2024-06-17 PROCEDURE — 84153 ASSAY OF PSA TOTAL: CPT

## 2024-06-17 PROCEDURE — 80053 COMPREHEN METABOLIC PANEL: CPT

## 2024-06-19 LAB — TESTOST SERPL-MCNC: 541 NG/DL (ref 240–950)

## 2024-06-20 ENCOUNTER — ONCOLOGY VISIT (OUTPATIENT)
Dept: ONCOLOGY | Facility: CLINIC | Age: 74
End: 2024-06-20
Attending: INTERNAL MEDICINE
Payer: COMMERCIAL

## 2024-06-20 ENCOUNTER — INFUSION THERAPY VISIT (OUTPATIENT)
Dept: INFUSION THERAPY | Facility: CLINIC | Age: 74
End: 2024-06-20
Attending: INTERNAL MEDICINE
Payer: COMMERCIAL

## 2024-06-20 VITALS
HEART RATE: 58 BPM | BODY MASS INDEX: 36.99 KG/M2 | DIASTOLIC BLOOD PRESSURE: 87 MMHG | OXYGEN SATURATION: 97 % | RESPIRATION RATE: 16 BRPM | SYSTOLIC BLOOD PRESSURE: 152 MMHG | HEIGHT: 68 IN | WEIGHT: 244.05 LBS

## 2024-06-20 VITALS
DIASTOLIC BLOOD PRESSURE: 87 MMHG | WEIGHT: 244 LBS | HEART RATE: 58 BPM | HEIGHT: 68 IN | RESPIRATION RATE: 16 BRPM | BODY MASS INDEX: 36.98 KG/M2 | SYSTOLIC BLOOD PRESSURE: 152 MMHG | OXYGEN SATURATION: 97 %

## 2024-06-20 DIAGNOSIS — C61 PROSTATE CANCER (H): Primary | ICD-10-CM

## 2024-06-20 DIAGNOSIS — E66.01 MORBID OBESITY (H): ICD-10-CM

## 2024-06-20 DIAGNOSIS — R79.89 ELEVATED LFTS: ICD-10-CM

## 2024-06-20 PROCEDURE — 99207 PR NO CHARGE LOS: CPT

## 2024-06-20 PROCEDURE — 250N000011 HC RX IP 250 OP 636: Mod: JZ | Performed by: INTERNAL MEDICINE

## 2024-06-20 PROCEDURE — G0463 HOSPITAL OUTPT CLINIC VISIT: HCPCS | Performed by: INTERNAL MEDICINE

## 2024-06-20 PROCEDURE — 96402 CHEMO HORMON ANTINEOPL SQ/IM: CPT

## 2024-06-20 PROCEDURE — 99214 OFFICE O/P EST MOD 30 MIN: CPT | Performed by: INTERNAL MEDICINE

## 2024-06-20 PROCEDURE — G2211 COMPLEX E/M VISIT ADD ON: HCPCS | Performed by: INTERNAL MEDICINE

## 2024-06-20 RX ORDER — LISINOPRIL 30 MG/1
TABLET ORAL
COMMUNITY
End: 2024-07-08

## 2024-06-20 RX ORDER — BICALUTAMIDE 50 MG/1
50 TABLET, FILM COATED ORAL DAILY
Qty: 14 TABLET | Refills: 0 | Status: SHIPPED | OUTPATIENT
Start: 2024-06-20

## 2024-06-20 RX ADMIN — LEUPROLIDE ACETATE 45 MG: KIT at 10:24

## 2024-06-20 ASSESSMENT — PAIN SCALES - GENERAL: PAINLEVEL: MILD PAIN (2)

## 2024-06-20 NOTE — PROGRESS NOTES
[facility] CANCER CLINIC  FOLLOW-UP VISIT NOTE    PATIENT NAME: Boaz Acosta MRN # [id number]  DATE OF VISIT: Jun 20, 2024 YOB: 1950    REFERRING PROVIDER: Rocío Boss MD  [address] [address]    CANCER TYPE: Prostate adenocarcinoma  STAGE: IV  ECOG PS: 0    TREATMENT SUMMARY:  Boaz's annual screening PSA was noted to be elevated at 17 in 2012, and 21 in the following year and it was attributed to prostatitis. Eventually, he did change his primary care physician and his repeat PSA was noted to be elevated at 31 in 04/2014. He was then referred to Urology, and was seen by Dr. Pressley with Urology in 07/2014. He was worked up with a biopsy of the prostate on 08/01//2014, which revealed adenocarcinoma of the prostate with Willis score of 4+3 involving 12 of the 12 cores. He was staged with a PET/CT scan, which did not show any evidence of distant metastasis. There was evidence of increased FDG uptake throughout the prostate with a maximum SUV of 7.1. He had a bone scan done on 08/19/2014, which did not show any obvious evidence of metastasis. Mr. Acosta underwent a robotic-assisted radical prostatectomy and lymph node sampling performed by Dr. Hesham Prieto on 10/24/2014. The pathology from this revealed adenocarcinoma of the prostate with a [place] of 7 and a tertiary pattern of 5. There was extensive prostatic tissue involvement with over 75% of the tissue with evidence of disease. There was extensive extraprostatic extension involving the apex, right and left anterior and posterior base, and soft tissue around both seminal vesicles. There was bilateral seminal vesicle invasion with involvement of vas deferens. Margins were involved with bladder neck and bilateral posterior seminal vesicle soft tissue apex. There was evidence of perineural invasion. The lymph node sample was negative for disease. His postoperative PSA  in 12/2014 remained elevated at 7.1, and all the subsequent PSAs have remained positive. His restaging scans done in January and again in 03/2015, including a CT abdomen and pelvis and bone scans, have been negative. He was referred to Radiation Oncology for consideration of salvage radiation therapy, and was seen by Dr. Murphy on 03/24/2015. Dr. Murphy referred him to Dr. Zbigniew Chu at the [facility] to have a choline-11 PET/CT scan done. The choline-11 scan done at [place] demonstrated evidence of 2 retroperitoneal lymph nodes that are mildly positive for uptake. There was no abnormal uptake in the prostate bed to suggest local recurrence. He was started on bicalutamide, and received his first dose of Lupron on 05/12/2015.   He comes for a scheduled follow up visit     5/12/2015 9/2/2015 1/4/2016 6/20/2019 7/30/2020   leuprolide  IM 30 mg 30 mg 30 mg 30 mg 30 mg      7/15/2021  9:43 AM 1/5/2023  8:57 AM   leuprolide (LUPRON DEPOT) IM 45 mg  45 mg      CURRENT TREATMENT  Off period of intermittent androgen deprivation therapy    SUBJECTIVE   Boaz comes for a scheduled follow up visit on androgen ablation therapy.     Boaz was seen in person.  He has been in good health since last visit.      He has been trying to lose weight but has not made any progress in last 6 months or even a year.     He is aware of his labs including his PSA.      He has been doing well and denies any other complains.      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    Leuprolide Acetate, 4 Month, (LUPRON DEPOT, 4-MONTH, IM) Inject 45 mg into the muscle    lisinopril (ZESTRIL) 30 MG tablet     UNABLE TO FIND MEDICATION NAME: mushroom supplement that helps to limit tumor growth    UNABLE TO FIND MEDICATION NAME: Turkey tail supplement     No current facility-administered medications for this visit.         ALLERGIES     Allergies   Allergen Reactions    Chlorthalidone      Only issue when he is using Lupron- vision disturbances          REVIEW OF SYSTEMS   As above in the HPI, o/w complete 12-point ROS was negative.     PHYSICAL EXAM   BP (!) 152/87 (BP Location: Left arm)   Pulse 58   Resp 16   Ht 1.727 m (5' 7.99\")   Wt 110.7 kg (244 lb)   SpO2 97%   BMI 37.11 kg/m      SpO2 Readings from Last 4 Encounters:   06/18/18 96%   10/30/17 97%   10/16/17 97%   06/19/17 96%     Wt Readings from Last 3 Encounters:   06/20/24 110.7 kg (244 lb)   05/04/24 110.5 kg (243 lb 9 oz)   12/14/23 115.1 kg (253 lb 12.8 oz)     GEN: NAD  HEENT: PERRL, EOMI, no icterus, injection or pallor. Oropharynx is clear.  NECK: no cervical or supraclavicular lymphadenopathy  LUNGS: clear bilaterally  CV: regular, no murmurs, rubs, or gallops  ABDOMEN: soft, non-tender, non-distended, normal bowel sounds, no hepatosplenomegaly by percussion or palpation  EXT: warm, well perfused, no edema  NEURO: alert  SKIN: lesion in subxiphoid (epigastrium) as below     LABORATORY AND IMAGING STUDIES     Recent Labs   Lab Test 06/17/24  0854 11/24/23  1301 07/03/23  0857 01/02/23  0845 06/30/22  0851    138 141 140 139   POTASSIUM 4.9 5.2 4.4 4.2 4.7   CHLORIDE 102 104 104 109 109   CO2 27 24 26 27 26   ANIONGAP 8 10 11 4 4   BUN 9.5 17.9 18.0 14 21 " "  CR 1.09 1.04 1.07 0.89 1.01   * 113* 135* 102* 111*   KERRY 9.4 9.4 8.6* 9.3 9.4     No results for input(s): \"MAG\", \"PHOS\" in the last 93923 hours.  Recent Labs   Lab Test 06/17/24  0854 11/24/23  1301 07/03/23  0857 01/02/23  0845 06/30/22  0851   WBC 7.1 7.1 6.9 7.4 7.3   HGB 13.7 13.0* 12.3* 12.8* 12.8*    279 228 237 250   MCV 97 98 94 95 98   NEUTROPHIL 50 54 57 48 56     Recent Labs   Lab Test 06/17/24  0854 11/24/23  1301 07/03/23  0857 01/02/23  0845   BILITOTAL 0.8 0.4 0.8 0.5   ALKPHOS 72 55 67 57   ALT 17 24 21 30   AST 23 31 31 24   ALBUMIN 4.2 4.4 4.3 3.5   LDH  --  212 203 154     TSH   Date Value Ref Range Status   03/14/2005 2.22 0.4 - 5.0 mU/L Final     No results for input(s): \"CEA\" in the last 84315 hours.  Results for orders placed or performed in visit on 11/28/23   NM Bone Scan Whole Body    Narrative    hhEXAMINATION: NM BONE SCAN WHOLE BODY  Whole-body bone scan, 11/28/2023 9:02 AM     HISTORY: Restaging - bony metastasis; Prostate cancer (H)     ADDITIONAL INFORMATION: Prostate cancer with metastasis to  retroperitoneal nodes with persistent PSA elevation post  prostatectomy; ish 4+3 disease, ?    COMPARISON: 3/18/2015 dated bone scan.    Correlation: 11/24/2023 CT    TECHNIQUE: The patient received 23.52 mCi of Tc-99m MDP intravenously.  Whole body bone images were obtained at 3 hours.    FINDINGS:     There are multifocal areas of radiotracer uptake along the right  lateral aspect of the thoracic and lumbar spine. On recent CT there  are bridging osteophytes at theses levels. Previously seen uptake in  the upper cervical spine on the left on posterior view is resolved.  Degenerative changes related uptake in the left knee, right ankle,  left midfoot and right base of the first big toe. Mild uptake by  bilateral shoulder joints again likely degenerative.   Physiologic tracer excretion through the kidneys and urinary bladder.      Impression    IMPRESSION: Technetium 99m " whole body bone scan demonstrates no focal  suspicious uptake throughout skeleton to suggest osteoblastic  metastasis.     BONNIE QUIROGA MD         SYSTEM ID:  [id number]     Recent Labs   Lab Test 06/17/24  0854 11/24/23  1301 07/03/23  0857 01/02/23  0845 06/30/22  0851   PSA 16.00* 1.08 0.26 6.12* 0.86   TESTOSTTOTAL 541 611 8* 578 638         ASSESSMENT AND PLAN   Prostate cancer with metastasis to retroperitoneal nodes with persistent PSA elevation post prostatectomy; ish 4+3 disease, tertiary score of 5  Elevated LFT's, obesity, elevated blood pressure at this visit  ECOG PS 0  No other medical comorbiditiies    Boaz is alone at this clinic visit.  He has been doing very well since the last visit.  He has no physical complaints at this visit.    I have reviewed all of the labs done prior to this clinic visit.  Labs are all completely normal including electrolytes, renal function, complete blood count and differential. He previously had mild normocytic anemia which has resolved. His hepatic panel which had revealed persistent elevation in his transaminases has resolved at this visit.     He is aware of his PSA values.  His PSA has gone up to 16 ng/ml from 1.08 ng/ml merely 6 months ago.  His PSA has doubled almost 4 times in last 6 months. I will switch him to continuous ADT. We will have to continually supppress his testosterone now as doubling time of 3 months or less is a poor prognostic sign and he has a doubling time of 6 weeks. I will administer leuprolide today and start him on bicalutamide daily for 2 weeks to cover for testosterone flare.     I have encouraged him to keep working on his weight.     I will again see him in 6 months with labs a week prior to visit including CBC, CMP, PSA, testosterone.     The longitudinal plan of care for the diagnosis(es)/condition(s) as documented were addressed during this visit. Due to the added complexity in care, I will continue to support Boaz in the subsequent  management and with ongoing continuity of care.    30 minutes spent on the date of the encounter doing chart review, history and exam, documentation and further activities as noted above      Pavel Norton    Hematologist and Medical Oncologist  [facility]

## 2024-06-20 NOTE — PROGRESS NOTES
Infusion Nursing Note:  Boaz Acosta presents today for Lupron.    Patient seen by provider today: Yes: Dr. Norton   present during visit today: Not Applicable.    Note: N/A.      Intravenous Access:  No Intravenous access/labs at this visit.    Treatment Conditions:  Not Applicable.      Post Infusion Assessment:  Patient tolerated injection without incident.  Site patent and intact, free from redness, edema or discomfort.       Discharge Plan:   Patient discharged in stable condition accompanied by: self.  Departure Mode: Ambulatory.      Donna Parnell LPN

## 2024-06-20 NOTE — NURSING NOTE
"Oncology Rooming Note    June 20, 2024 9:51 AM   Boaz Acosta is a 73 year old male who presents for:    Chief Complaint   Patient presents with    Oncology Clinic Visit     6 month follow up     Initial Vitals: BP (!) 152/87 (BP Location: Left arm)   Pulse 58   Resp 16   Ht 1.727 m (5' 7.99\")   Wt 110.7 kg (244 lb)   SpO2 97%   BMI 37.11 kg/m   Estimated body mass index is 37.11 kg/m  as calculated from the following:    Height as of this encounter: 1.727 m (5' 7.99\").    Weight as of this encounter: 110.7 kg (244 lb). Body surface area is 2.3 meters squared.  Mild Pain (2) Comment: Data Unavailable   No LMP for male patient.  Allergies reviewed: Yes  Medications reviewed: Yes    Medications: Medication refills not needed today.  Pharmacy name entered into [facility]: [facility] [id number] [place] [address]    Frailty Screening:   Is the patient here for a new oncology consult visit in cancer care? 2. No      Clinical concerns: results       Donna Parnell LPN              "

## 2024-06-20 NOTE — LETTER
6/20/2024      Boaz Acosta  [address] [id number]      Dear Colleague,    Thank you for referring your patient, Boaz Acosta, to the [facility]. Please see a copy of my visit note below.    [facility]  FOLLOW-UP VISIT NOTE    PATIENT NAME: Boaz Acosta MRN # [id number]  DATE OF VISIT: Jun 20, 2024 YOB: 1950    REFERRING PROVIDER: Rocío Boss MD  [facility]  [address] [place] [address]    CANCER TYPE: Prostate adenocarcinoma  STAGE: IV  ECOG PS: 0    TREATMENT SUMMARY:  Boaz's annual screening PSA was noted to be elevated at 17 in 2012, and 21 in the following year and it was attributed to prostatitis. Eventually, he did change his primary care physician and his repeat PSA was noted to be elevated at 31 in 04/2014. He was then referred to Urology, and was seen by Dr. Pressley with Urology in 07/2014. He was worked up with a biopsy of the prostate on 08/01//2014, which revealed adenocarcinoma of the prostate with Willis score of 4+3 involving 12 of the 12 cores. He was staged with a PET/CT scan, which did not show any evidence of distant metastasis. There was evidence of increased FDG uptake throughout the prostate with a maximum SUV of 7.1. He had a bone scan done on 08/19/2014, which did not show any obvious evidence of metastasis. Mr. Acosta underwent a robotic-assisted radical prostatectomy and lymph node sampling performed by Dr. Hesham Prieto on 10/24/2014. The pathology from this revealed adenocarcinoma of the prostate with a Willis of 7 and a tertiary pattern of 5. There was extensive prostatic tissue involvement with over 75% of the tissue with evidence of disease. There was extensive extraprostatic extension involving the apex, right and left anterior and posterior base, and soft tissue around both seminal vesicles. There was bilateral seminal vesicle  invasion with involvement of vas deferens. Margins were involved with bladder neck and bilateral posterior seminal vesicle soft tissue apex. There was evidence of perineural invasion. The lymph node sample was negative for disease. His postoperative PSA in 12/2014 remained elevated at 7.1, and all the subsequent PSAs have remained positive. His restaging scans done in January and again in 03/2015, including a CT abdomen and pelvis and bone scans, have been negative. He was referred to Radiation Oncology for consideration of salvage radiation therapy, and was seen by Dr. Murphy on 03/24/2015. Dr. Murphy referred him to Dr. Zbigniew Chu at the [facility] to have a choline-11 PET/CT scan done. The choline-11 scan done at [facility] demonstrated evidence of 2 retroperitoneal lymph nodes that are mildly positive for uptake. There was no abnormal uptake in the prostate bed to suggest local recurrence. He was started on bicalutamide, and received his first dose of Lupron on 05/12/2015.   He comes for a scheduled follow up visit     5/12/2015 9/2/2015 1/4/2016 6/20/2019 7/30/2020   leuprolide  IM 30 mg 30 mg 30 mg 30 mg 30 mg      7/15/2021  9:43 AM 1/5/2023  8:57 AM   leuprolide (LUPRON DEPOT) IM 45 mg  45 mg      CURRENT TREATMENT  Off period of intermittent androgen deprivation therapy    SUBJECTIVE   Boaz comes for a scheduled follow up visit on androgen ablation therapy.     Boaz was seen in person.  He has been in good health since last visit.      He has been trying to lose weight but has not made any progress in last 6 months or even a year.     He is aware of his labs including his PSA.      He has been doing well and denies any other complains.      Wt Readings from Last 10 Encounters:   06/20/24 110.7 kg (244 lb)   05/04/24 110.5 kg (243 lb 9 oz)   12/14/23 115.1 kg (253 lb 12.8 oz)   11/30/23 111.6 kg (246 lb)   07/20/23 108.7 kg (239 lb 9.6 oz)   01/05/23 107 kg (236 lb)   10/18/22 113.5 kg (250 lb 3.2 oz)   10/10/22 111.7  "kg (246 lb 3.2 oz)   07/07/22 110.1 kg (242 lb 11.2 oz)   01/06/22 110.2 kg (243 lb)       PAST MEDICAL HISTORY   Prostate cancer with disease metastatic to para-aortic nodes   Borderline HTN  ADALID an CPAP at night  GERD      CURRENT OUTPATIENT MEDICATIONS     Current Outpatient Medications   Medication Sig     ascorbic acid (VITAMIN C) 1000 MG TABS Take 1-2 tablets by mouth as needed      indomethacin (INDOCIN) 50 MG capsule Take 1 capsule (50 mg) by mouth 2 times daily (with meals) Take as needed for Gout.     Leuprolide Acetate, 4 Month, (LUPRON DEPOT, 4-MONTH, IM) Inject 45 mg into the muscle     lisinopril (ZESTRIL) 30 MG tablet      UNABLE TO FIND MEDICATION NAME: mushroom supplement that helps to limit tumor growth     UNABLE TO FIND MEDICATION NAME: Turkey tail supplement     No current facility-administered medications for this visit.         ALLERGIES     Allergies   Allergen Reactions     Chlorthalidone      Only issue when he is using Lupron- vision disturbances          REVIEW OF SYSTEMS   As above in the HPI, o/w complete 12-point ROS was negative.     PHYSICAL EXAM   BP (!) 152/87 (BP Location: Left arm)   Pulse 58   Resp 16   Ht 1.727 m (5' 7.99\")   Wt 110.7 kg (244 lb)   SpO2 97%   BMI 37.11 kg/m      SpO2 Readings from Last 4 Encounters:   06/18/18 96%   10/30/17 97%   10/16/17 97%   06/19/17 96%     Wt Readings from Last 3 Encounters:   06/20/24 110.7 kg (244 lb)   05/04/24 110.5 kg (243 lb 9 oz)   12/14/23 115.1 kg (253 lb 12.8 oz)     GEN: NAD  HEENT: PERRL, EOMI, no icterus, injection or pallor. Oropharynx is clear.  NECK: no cervical or supraclavicular lymphadenopathy  LUNGS: clear bilaterally  CV: regular, no murmurs, rubs, or gallops  ABDOMEN: soft, non-tender, non-distended, normal bowel sounds, no hepatosplenomegaly by percussion or palpation  EXT: warm, well perfused, no edema  NEURO: alert  SKIN: lesion in subxiphoid (epigastrium) as below     LABORATORY AND IMAGING STUDIES " "    Recent Labs   Lab Test 06/17/24  0854 11/24/23  1301 07/03/23  0857 01/02/23  0845 06/30/22  0851    138 141 140 139   POTASSIUM 4.9 5.2 4.4 4.2 4.7   CHLORIDE 102 104 104 109 109   CO2 27 24 26 27 26   ANIONGAP 8 10 11 4 4   BUN 9.5 17.9 18.0 14 21   CR 1.09 1.04 1.07 0.89 1.01   * 113* 135* 102* 111*   KERRY 9.4 9.4 8.6* 9.3 9.4     No results for input(s): \"MAG\", \"PHOS\" in the last 38652 hours.  Recent Labs   Lab Test 06/17/24  0854 11/24/23  1301 07/03/23  0857 01/02/23  0845 06/30/22  0851   WBC 7.1 7.1 6.9 7.4 7.3   HGB 13.7 13.0* 12.3* 12.8* 12.8*    279 228 237 250   MCV 97 98 94 95 98   NEUTROPHIL 50 54 57 48 56     Recent Labs   Lab Test 06/17/24  0854 11/24/23  1301 07/03/23  0857 01/02/23  0845   BILITOTAL 0.8 0.4 0.8 0.5   ALKPHOS 72 55 67 57   ALT 17 24 21 30   AST 23 31 31 24   ALBUMIN 4.2 4.4 4.3 3.5   LDH  --  212 203 154     TSH   Date Value Ref Range Status   03/14/2005 2.22 0.4 - 5.0 mU/L Final     No results for input(s): \"CEA\" in the last 91688 hours.  Results for orders placed or performed in visit on 11/28/23   NM Bone Scan Whole Body    Narrative    hhEXAMINATION: NM BONE SCAN WHOLE BODY  Whole-body bone scan, 11/28/2023 9:02 AM     HISTORY: Restaging - bony metastasis; Prostate cancer (H)     ADDITIONAL INFORMATION: Prostate cancer with metastasis to  retroperitoneal nodes with persistent PSA elevation post  prostatectomy; ish 4+3 disease, ?    COMPARISON: 3/18/2015 dated bone scan.    Correlation: 11/24/2023 CT    TECHNIQUE: The patient received 23.52 mCi of Tc-99m MDP intravenously.  Whole body bone images were obtained at 3 hours.    FINDINGS:     There are multifocal areas of radiotracer uptake along the right  lateral aspect of the thoracic and lumbar spine. On recent CT there  are bridging osteophytes at theses levels. Previously seen uptake in  the upper cervical spine on the left on posterior view is resolved.  Degenerative changes related uptake in the " left knee, right ankle,  left midfoot and right base of the first big toe. Mild uptake by  bilateral shoulder joints again likely degenerative.   Physiologic tracer excretion through the kidneys and urinary bladder.      Impression    IMPRESSION: Technetium 99m whole body bone scan demonstrates no focal  suspicious uptake throughout skeleton to suggest osteoblastic  metastasis.     BONNIE QUIROGA MD         SYSTEM ID:  [id number]     Recent Labs   Lab Test 06/17/24  0854 11/24/23  1301 07/03/23  0857 01/02/23  0845 06/30/22  0851   PSA 16.00* 1.08 0.26 6.12* 0.86   TESTOSTTOTAL 541 611 8* 578 638         ASSESSMENT AND PLAN   Prostate cancer with metastasis to retroperitoneal nodes with persistent PSA elevation post prostatectomy; sih 4+3 disease, tertiary score of 5  Elevated LFT's, obesity, elevated blood pressure at this visit  ECOG PS 0  No other medical comorbiditiies    Boaz is alone at this clinic visit.  He has been doing very well since the last visit.  He has no physical complaints at this visit.    I have reviewed all of the labs done prior to this clinic visit.  Labs are all completely normal including electrolytes, renal function, complete blood count and differential. He previously had mild normocytic anemia which has resolved. His hepatic panel which had revealed persistent elevation in his transaminases has resolved at this visit.     He is aware of his PSA values.  His PSA has gone up to 16 ng/ml from 1.08 ng/ml merely 6 months ago.  His PSA has doubled almost 4 times in last 6 months. I will switch him to continuous ADT. We will have to continually supppress his testosterone now as doubling time of 3 months or less is a poor prognostic sign and he has a doubling time of 6 weeks. I will administer leuprolide today and start him on bicalutamide daily for 2 weeks to cover for testosterone flare.     I have encouraged him to keep working on his weight.     I will again see him in 6 months with labs a  week prior to visit including CBC, CMP, PSA, testosterone.     The longitudinal plan of care for the diagnosis(es)/condition(s) as documented were addressed during this visit. Due to the added complexity in care, I will continue to support Boaz in the subsequent management and with ongoing continuity of care.    30 minutes spent on the date of the encounter doing chart review, history and exam, documentation and further activities as noted above      Pavel Norton    Hematologist and Medical Oncologist  [facility]      Again, thank you for allowing me to participate in the care of your patient.        Sincerely,        Pavel Norton MD

## 2024-06-30 ENCOUNTER — PATIENT OUTREACH (OUTPATIENT)
Dept: CARE COORDINATION | Facility: CLINIC | Age: 74
End: 2024-06-30
Payer: COMMERCIAL

## 2024-07-08 ENCOUNTER — MYC MEDICAL ADVICE (OUTPATIENT)
Dept: FAMILY MEDICINE | Facility: CLINIC | Age: 74
End: 2024-07-08
Payer: COMMERCIAL

## 2024-07-08 DIAGNOSIS — I10 UNCONTROLLED HYPERTENSION: Primary | ICD-10-CM

## 2024-07-12 NOTE — TELEPHONE ENCOUNTER
Patient calling back regarding MyChart message. He is completely out of medication, and requesting PCP send in refill as soon as possible.    RN noted that Lisinopril is patient reported/historical, however, patient had noted that he had seen PCP for BP management and he was the one who prescribed medication.    Please advise.     AMOL ToN JADIEL  [facility]

## 2024-07-16 RX ORDER — LISINOPRIL 30 MG/1
30 TABLET ORAL DAILY
Qty: 90 TABLET | Refills: 0 | Status: SHIPPED | OUTPATIENT
Start: 2024-07-16

## 2024-07-16 NOTE — TELEPHONE ENCOUNTER
Encounter sent to PCP and secure message sent to PCP requesting review of encounter before end of day today. Provided responded and stated that he will review before end of day.     Thanks,  JADIEL Lee  [facility]

## 2024-07-16 NOTE — TELEPHONE ENCOUNTER
[employer] message sent to patient.     Thanks,  JADIEL Lee  [facility]

## 2024-07-16 NOTE — TELEPHONE ENCOUNTER
Received additional call from patient. He is very frustrated; states that he has been waiting to hear back from Dr. Singh regarding this refill and has been out for a couple days now. He feels this should have been addressed by now and is discussing potentially switching providers.     Please send patient Coreyhart back with response.    AMOL SrivastavaN RN  [facility], [facility]

## 2024-08-12 NOTE — PROGRESS NOTES
DISCHARGE  Reason for Discharge: Patient chooses to discontinue therapy.    Equipment Issued: HEP    Discharge Plan: Patient to continue home program.    Referring Provider:  Gianluca Powell     04/08/24 0500   Appointment Info   Signing clinician's name / credentials CAMILO Jorge   Total/Authorized Visits 4   Visits Used 3   Medical Diagnosis Chronic low back pain without sciatica, unspecified back pain laterality   PT Tx Diagnosis Chronic LBP   Quick Adds Certification   Progress Note/Certification   Start of Care Date 03/25/24   Onset of illness/injury or Date of Surgery 03/19/24   Therapy Frequency 1x/week   Predicted Duration x8 weeks   Certification date from 03/25/24   Certification date to 05/20/24   Progress Note Completed Date 03/25/24   PT Goal 1   Goal Identifier dressing   Goal Description able to don/doff shoes/socks/LE garments without LBP   Rationale to maximize safety and independence with performance of ADLs and functional tasks;to maximize safety and independence with self cares;to maximize safety and independence within the home   Goal Progress pt more sore today after sneezing and triggering back pain;  was doing well   Target Date 05/20/24   Subjective Report   Subjective Report pt was doing well; pain has been on and off in the LB region until he woke up today; pt stated he sneezed 3 times when in bed this AM and that set off his back pain.  Currently has burning pain; did take 3 advil prior to PT today.  Pt to return to PT following his trip to [place]   Objective Measures   Objective Measures Objective Measure 1;Objective Measure 2   Objective Measure 1   Objective Measure palpation   Details slight tenderness R lumbar paraspinals   Objective Measure 2   Objective Measure TROM   Details flex= touches top of foot; movement is slow   Treatment Interventions (PT)   Interventions Therapeutic Procedure/Exercise;Manual Therapy   Therapeutic Procedure/Exercise   Ther  Proc 1 UBE   Ther Proc 1 - Details x5'   PTRx Ther Proc 1 Side Stepping With Theraband   PTRx Ther Proc 1 - Details causes pain in SI joint   PTRx Ther Proc 2 Clamshell Feet together   PTRx Ther Proc 2 - Details pt is doing; reports he really paying attention to is posture   PTRx Ther Proc 3 Knee Bends   PTRx Ther Proc 3 - Details HEP   PTRx Ther Proc 4 Thoracic Extension   PTRx Ther Proc 4 - Details HEP   PTRx Ther Proc 5 Supine Lumbar Hip Roll   PTRx Ther Proc 5 - Details pt likes to do because he feels like he is massaging the SIjoint when he rolls to the  right and left   PTRx Ther Proc 6 Abdominal Strengthening For Diastasis Recti   PTRx Ther Proc 6 - Details no issues   PTRx Ther Proc 7 Supine Abdominal Exercise #3 (Marching)   PTRx Ther Proc 7 - Details pt is not marching but sliding the heels out and back; might have intermittent lifting of heels   PTRx Ther Proc 8 Bridging #1   PTRx Ther Proc 8 - Details no issues   Skilled Intervention verbally reviewed exercises today per pt request   Patient Response/Progress pt doing hEP as he tolerates   Manual Therapy   Manual Therapy: Mobilization, MFR, MLD, friction massage minutes ([id number]) 12   Manual Therapy 1 STM Carlos lumbar paraspinals and gluteal region   Manual Therapy 1 - Details x12'   Skilled Intervention manual skills   Patient Response/Progress feels good   Education   Learner/Method Demonstration;Pictures/Video   Plan   Home program no changes; pt to do as tolerate   Plan for next session pt to return after his trip to [place]   Total Session Time   Timed Code Treatment Minutes 12   Total Treatment Time (sum of timed and untimed services) 12

## 2024-08-18 ENCOUNTER — OFFICE VISIT (OUTPATIENT)
Dept: URGENT CARE | Facility: URGENT CARE | Age: 74
End: 2024-08-18
Payer: COMMERCIAL

## 2024-08-18 VITALS
RESPIRATION RATE: 18 BRPM | DIASTOLIC BLOOD PRESSURE: 89 MMHG | SYSTOLIC BLOOD PRESSURE: 154 MMHG | TEMPERATURE: 97.1 F | OXYGEN SATURATION: 99 % | HEART RATE: 67 BPM

## 2024-08-18 DIAGNOSIS — C61 PROSTATE CANCER (H): ICD-10-CM

## 2024-08-18 DIAGNOSIS — M10.9 GOUTY ARTHRITIS OF LEFT FOOT: Primary | ICD-10-CM

## 2024-08-18 PROCEDURE — 99214 OFFICE O/P EST MOD 30 MIN: CPT | Performed by: PHYSICIAN ASSISTANT

## 2024-08-18 RX ORDER — PREDNISONE 10 MG/1
TABLET ORAL
Qty: 30 TABLET | Refills: 0 | Status: SHIPPED | OUTPATIENT
Start: 2024-08-18 | End: 2024-08-18

## 2024-08-18 RX ORDER — PREDNISONE 10 MG/1
TABLET ORAL
Qty: 30 TABLET | Refills: 0 | Status: SHIPPED | OUTPATIENT
Start: 2024-08-18 | End: 2024-09-10

## 2024-08-18 ASSESSMENT — PAIN SCALES - GENERAL: PAINLEVEL: SEVERE PAIN (7)

## 2024-08-18 NOTE — PROGRESS NOTES
Chief Complaint   Patient presents with    Arthritis     Gout flare up (day 10) on left foot            ASSESSMENT:    ICD-10-CM    1. Gouty arthritis of left foot  M10.9 predniSONE (DELTASONE) 10 MG tablet      2. Prostate cancer (H)  C61             PLAN: Gouty arthritis flare left foot from restarting prostate cancer medication.  I feel he should go to the ER for ultrasound to make sure there is no vascular compromise  but he declines.  He states this is exactly like his gout flares.  He wants to start the prednisone and if he is not better in 24 hours he will go to the ER.  Prednisone taper.  Told him that prednisone can weaken tendons so be careful with walking with his Achilles tendon as it has been sore.  Normal kidney function in June 2024.    Jazmyn Samuel PA-C        SUBJECTIVE:  Boaz Acosta is an 74 year old male who presents with left foot gouty arthritis for 10 days.  Has history of gout with tophi.  Has had recent flare as he is back on medication for pancreas cancer.  This flare seems to mainly affect the second toe.  Showed me picture from 10 days ago and it was extremely red and swollen.  Swelling has subsided but still very painful.  Feels he needs prednisone.    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      OBJECTIVE:  Blood pressure (!) 154/89, pulse 67, temperature 97.1  F (36.2  C), temperature source Tympanic, resp. rate 18, SpO2 99%.  Patient is alert and NAD.  EYES: conjunctiva  clear  Ankle/foot exam (right):  Inspection/palpation: Left foot is swollen with mild redness distal dorsal aspect.  Left second toe is swollen and mildly dusky looking.  Patient showed me a picture from 10 days ago and it was bright red and much more swollen.  Foot is not warm to touch.  Do not suspect infection.  All toes feel on the cooler side on both feet.  Cap refill intact.    Trace dorsalis pedis pulse right foot, hard to palpate due to the foot swelling but was finally able to feel trace.  Neurovascularly Intact Distally.   No calf tenderness.  No calf swelling.  Negative Homans.  Achilles tender mildly tender.  No palpable step-off.    Jazmyn Samuel PA-C

## 2024-09-10 ENCOUNTER — OFFICE VISIT (OUTPATIENT)
Dept: FAMILY MEDICINE | Facility: CLINIC | Age: 74
End: 2024-09-10
Attending: FAMILY MEDICINE
Payer: COMMERCIAL

## 2024-09-10 VITALS
TEMPERATURE: 97.2 F | HEART RATE: 56 BPM | DIASTOLIC BLOOD PRESSURE: 70 MMHG | SYSTOLIC BLOOD PRESSURE: 152 MMHG | OXYGEN SATURATION: 96 % | BODY MASS INDEX: 35.77 KG/M2 | HEIGHT: 68 IN | RESPIRATION RATE: 16 BRPM | WEIGHT: 236 LBS

## 2024-09-10 DIAGNOSIS — M10.9 GOUTY ARTHRITIS OF LEFT FOOT: ICD-10-CM

## 2024-09-10 DIAGNOSIS — I10 HYPERTENSION GOAL BP (BLOOD PRESSURE) < 140/90: Chronic | ICD-10-CM

## 2024-09-10 DIAGNOSIS — M1A.9XX1 CHRONIC GOUT INVOLVING TOE OF LEFT FOOT WITH TOPHUS, UNSPECIFIED CAUSE: ICD-10-CM

## 2024-09-10 DIAGNOSIS — E66.01 MORBID OBESITY (H): ICD-10-CM

## 2024-09-10 DIAGNOSIS — M10.9 ACUTE GOUT OF RIGHT ANKLE, UNSPECIFIED CAUSE: Primary | ICD-10-CM

## 2024-09-10 LAB — URATE SERPL-MCNC: 8.1 MG/DL (ref 3.4–7)

## 2024-09-10 PROCEDURE — 36415 COLL VENOUS BLD VENIPUNCTURE: CPT | Performed by: FAMILY MEDICINE

## 2024-09-10 PROCEDURE — 99215 OFFICE O/P EST HI 40 MIN: CPT | Performed by: FAMILY MEDICINE

## 2024-09-10 PROCEDURE — G2211 COMPLEX E/M VISIT ADD ON: HCPCS | Performed by: FAMILY MEDICINE

## 2024-09-10 PROCEDURE — 84550 ASSAY OF BLOOD/URIC ACID: CPT | Performed by: FAMILY MEDICINE

## 2024-09-10 RX ORDER — INDOMETHACIN 50 MG/1
50 CAPSULE ORAL 2 TIMES DAILY WITH MEALS
Qty: 60 CAPSULE | Refills: 11 | Status: SHIPPED | OUTPATIENT
Start: 2024-09-10

## 2024-09-10 RX ORDER — ALLOPURINOL 100 MG/1
100 TABLET ORAL DAILY
Qty: 90 TABLET | Refills: 1 | Status: SHIPPED | OUTPATIENT
Start: 2024-09-10

## 2024-09-10 RX ORDER — PREDNISONE 10 MG/1
TABLET ORAL
Qty: 30 TABLET | Refills: 5 | Status: SHIPPED | OUTPATIENT
Start: 2024-09-10

## 2024-09-10 NOTE — PROGRESS NOTES
"  Assessment & Plan       ICD-10-CM    1. Acute gout of right ankle, unspecified cause  M10.9 indomethacin (INDOCIN) 50 MG capsule      2. Obesity (BMI 35.0-39.9) with comorbidity (H)  E66.01 PRIMARY CARE FOLLOW-UP SCHEDULING      3. Hypertension goal BP (blood pressure) < 140/90  I10 PRIMARY CARE FOLLOW-UP SCHEDULING      4. Need for shingles vaccine  Z23       5. Need for vaccination against respiratory syncytial virus  Z29.11       6. Chronic gout involving toe of left foot with tophus, unspecified cause  M1A.9XX1 allopurinol (ZYLOPRIM) 100 MG tablet     Uric acid     PRIMARY CARE FOLLOW-UP SCHEDULING     Uric acid      7. Gouty arthritis of left foot  M10.9 predniSONE (DELTASONE) 10 MG tablet            A total of 40 minutes spent face-to-face with greater than 50% of the time spent in counseling and coordinating cares of the issues above     The longitudinal plan of care for the diagnosis(es)/condition(s) as documented were addressed during this visit. Due to the added complexity in care, I will continue to support Boaz in the subsequent management and with ongoing continuity of care.   BMI  Estimated body mass index is 35.89 kg/m  as calculated from the following:    Height as of this encounter: 1.727 m (5' 7.99\").    Weight as of this encounter: 107 kg (236 lb).   Weight management plan: Discussed healthy diet and exercise guidelines      There are no Patient Instructions on file for this visit.    Subjective   Boaz is a 74 year old, presenting for the following health issues:  Hypertension and Arthritis        9/10/2024     7:20 AM   Additional Questions   Roomed by Gala   Accompanied by none         9/10/2024     7:20 AM   Patient Reported Additional Medications   Patient reports taking the following new medications none     History of Present Illness       Hypertension: He presents for follow up of hypertension.  He does check blood pressure  regularly outside of the clinic. Outside blood pressures have " "been over 140/90. He follows a low salt diet.     Reason for visit:  BP, GOUT, VACCINES? COVID AND/OR INFLUENZA? CONSIDERATION OF ALLOPURINOL GOING FORWARD FOR GOUT?    He eats 2-3 servings of fruits and vegetables daily.He consumes 0 sweetened beverage(s) daily.He exercises with enough effort to increase his heart rate 30 to 60 minutes per day.  He exercises with enough effort to increase his heart rate 4 days per week. He is missing 1 dose(s) of medications per week.  He is not taking prescribed medications regularly due to remembering to take.       BP Readings from Last 6 Encounters:   09/10/24 (!) 152/70   08/18/24 (!) 154/89   06/20/24 (!) 152/87   06/20/24 (!) 152/87   05/04/24 (!) 155/91   12/14/23 (!) 168/74     Wt Readings from Last 4 Encounters:   09/10/24 107 kg (236 lb)   06/20/24 110.7 kg (244 lb 0.8 oz)   06/20/24 110.7 kg (244 lb)   05/04/24 110.5 kg (243 lb 9 oz)     Weight watchers  Effective weight loss  Regular light exercise when out of gout flare    Gout flare aug 7th  Wondering if he can have prednisone refills  Needs indomethacin refill  Treatment for prostate cancer may raise uric acid level.  Ok with starting daily alopurinol  Patient states previous tophi had drained on they're own at home    Prostate cancer  Followed by oncology    Hypertension  Home pressures are wnl  130's/80's                Review of Systems  Constitutional, HEENT, cardiovascular, pulmonary, gi and gu systems are negative, except as otherwise noted.      Objective    BP (!) 152/70   Pulse 56   Temp 97.2  F (36.2  C) (Temporal)   Resp 16   Ht 1.727 m (5' 7.99\")   Wt 107 kg (236 lb)   SpO2 96%   BMI 35.89 kg/m    Body mass index is 35.89 kg/m .  Physical Exam  Constitutional:       General: He is not in acute distress.     Appearance: Normal appearance. He is well-developed. He is not ill-appearing.   HENT:      Head: Normocephalic and atraumatic.      Right Ear: External ear normal.      Left Ear: External ear " normal.      Nose: Nose normal.   Eyes:      General: No scleral icterus.     Extraocular Movements: Extraocular movements intact.      Conjunctiva/sclera: Conjunctivae normal.   Cardiovascular:      Rate and Rhythm: Normal rate.   Pulmonary:      Effort: Pulmonary effort is normal.   Musculoskeletal:      Cervical back: Normal range of motion and neck supple.      Comments: Tophus with inflammation left 2nd toe   Skin:     General: Skin is warm and dry.   Neurological:      Mental Status: He is alert and oriented to person, place, and time.   Psychiatric:         Behavior: Behavior normal.         Thought Content: Thought content normal.         Judgment: Judgment normal.                    Signed Electronically by: Gianluca Singh MD

## 2024-09-18 ENCOUNTER — MYC MEDICAL ADVICE (OUTPATIENT)
Dept: FAMILY MEDICINE | Facility: CLINIC | Age: 74
End: 2024-09-18
Payer: COMMERCIAL

## 2024-09-18 ENCOUNTER — NURSE TRIAGE (OUTPATIENT)
Dept: FAMILY MEDICINE | Facility: CLINIC | Age: 74
End: 2024-09-18
Payer: COMMERCIAL

## 2024-09-18 NOTE — TELEPHONE ENCOUNTER
"Reached out to patient. Triaged for bloody stools. Advised ER with information below (see answer assessment). Patient agrees with plan and will proceed.    Reason for Disposition   Bloody, black, or tarry bowel movements  (Exception: Chronic-unchanged black-grey bowel movements and is taking iron pills or Pepto-Bismol.)    Additional Information   Negative: Passed out (i.e., fainted, collapsed and was not responding)   Negative: Shock suspected (e.g., cold/pale/clammy skin, too weak to stand, low BP, rapid pulse)   Negative: Vomiting red blood or black (coffee ground) material   Negative: Sounds like a life-threatening emergency to the triager   Negative: Diarrhea is main symptom   Negative: Rectal symptoms   Negative: MODERATE rectal bleeding (small blood clots, passing blood without stool, or toilet water turns red) more than once a day   Negative: SEVERE rectal bleeding (large blood clots; constant or on and off bleeding)   Negative: SEVERE dizziness (e.g., unable to stand, requires support to walk, feels like passing out now)    Answer Assessment - Initial Assessment Questions  1. APPEARANCE of BLOOD: \"What color is it?\" \"Is it passed separately, on the surface of the stool, or mixed in with the stool?\"       Black stool, blood is mixed in. Red blood.   2. AMOUNT: \"How much blood was passed?\"       Hard to tell d/t mixed in with water. It doesn't seem like a lot, 5-10mL or so?   3. FREQUENCY: \"How many times has blood been passed with the stools?\"       Yesterday morning and then this morning.   4. ONSET: \"When was the blood first seen in the stools?\" (Days or weeks)       Yesterday.   5. DIARRHEA: \"Is there also some diarrhea?\" If Yes, ask: \"How many diarrhea stools in the past 24 hours?\"       No.   6. CONSTIPATION: \"Do you have constipation?\" If Yes, ask: \"How bad is it?\"      No.   7. RECURRENT SYMPTOMS: \"Have you had blood in your stools before?\" If Yes, ask: \"When was the last time?\" and \"What happened " "that time?\"       Completely new.   8. BLOOD THINNERS: \"Do you take any blood thinners?\" (e.g., Coumadin/warfarin, Pradaxa/dabigatran, aspirin)      No.   9. OTHER SYMPTOMS: \"Do you have any other symptoms?\"  (e.g., abdomen pain, vomiting, dizziness, fever)      No.  10. PREGNANCY: \"Is there any chance you are pregnant?\" \"When was your last menstrual period?\"        No.    Protocols used: Rectal Bleeding-A-OH    AMOL SrivastavaN JADIEL  [facility]  "

## 2024-09-24 ENCOUNTER — TRANSFERRED RECORDS (OUTPATIENT)
Dept: HEALTH INFORMATION MANAGEMENT | Facility: CLINIC | Age: 74
End: 2024-09-24
Payer: COMMERCIAL

## 2024-10-02 ENCOUNTER — DOCUMENTATION ONLY (OUTPATIENT)
Dept: FAMILY MEDICINE | Facility: CLINIC | Age: 74
End: 2024-10-02

## 2024-10-02 ENCOUNTER — LAB (OUTPATIENT)
Dept: LAB | Facility: CLINIC | Age: 74
End: 2024-10-02
Payer: COMMERCIAL

## 2024-10-02 DIAGNOSIS — E66.01 MORBID OBESITY (H): ICD-10-CM

## 2024-10-02 DIAGNOSIS — M10.9 ACUTE GOUT OF RIGHT ANKLE, UNSPECIFIED CAUSE: Primary | ICD-10-CM

## 2024-10-02 DIAGNOSIS — R10.11 ABDOMINAL PAIN, RIGHT UPPER QUADRANT: ICD-10-CM

## 2024-10-02 DIAGNOSIS — M10.9 ACUTE GOUT OF RIGHT ANKLE, UNSPECIFIED CAUSE: ICD-10-CM

## 2024-10-02 DIAGNOSIS — C61 PROSTATE CANCER (H): ICD-10-CM

## 2024-10-02 LAB — HOLD SPECIMEN: NORMAL

## 2024-10-02 PROCEDURE — 84550 ASSAY OF BLOOD/URIC ACID: CPT

## 2024-10-02 PROCEDURE — 36415 COLL VENOUS BLD VENIPUNCTURE: CPT

## 2024-10-03 LAB — URATE SERPL-MCNC: 7.1 MG/DL (ref 3.4–7)

## 2024-10-10 DIAGNOSIS — I10 UNCONTROLLED HYPERTENSION: ICD-10-CM

## 2024-10-10 RX ORDER — LISINOPRIL 30 MG/1
30 TABLET ORAL DAILY
Qty: 90 TABLET | Refills: 3 | Status: SHIPPED | OUTPATIENT
Start: 2024-10-10

## 2024-10-15 ENCOUNTER — TRANSFERRED RECORDS (OUTPATIENT)
Dept: HEALTH INFORMATION MANAGEMENT | Facility: CLINIC | Age: 74
End: 2024-10-15
Payer: COMMERCIAL

## 2024-10-16 PROBLEM — G89.29 CHRONIC RIGHT-SIDED LOW BACK PAIN WITHOUT SCIATICA: Status: RESOLVED | Noted: 2024-03-25 | Resolved: 2024-10-16

## 2024-10-16 PROBLEM — M54.50 CHRONIC RIGHT-SIDED LOW BACK PAIN WITHOUT SCIATICA: Status: RESOLVED | Noted: 2024-03-25 | Resolved: 2024-10-16

## 2024-10-21 ENCOUNTER — PATIENT OUTREACH (OUTPATIENT)
Dept: GASTROENTEROLOGY | Facility: CLINIC | Age: 74
End: 2024-10-21
Payer: COMMERCIAL

## 2024-12-16 ENCOUNTER — LAB (OUTPATIENT)
Dept: INFUSION THERAPY | Facility: CLINIC | Age: 74
End: 2024-12-16
Attending: INTERNAL MEDICINE
Payer: COMMERCIAL

## 2024-12-16 DIAGNOSIS — E66.01 MORBID OBESITY (H): ICD-10-CM

## 2024-12-16 DIAGNOSIS — C61 PROSTATE CANCER (H): ICD-10-CM

## 2024-12-16 DIAGNOSIS — R10.11 ABDOMINAL PAIN, RIGHT UPPER QUADRANT: ICD-10-CM

## 2024-12-16 LAB
ALBUMIN SERPL BCG-MCNC: 4.2 G/DL (ref 3.5–5.2)
ALP SERPL-CCNC: 59 U/L (ref 40–150)
ALT SERPL W P-5'-P-CCNC: 20 U/L (ref 0–70)
ANION GAP SERPL CALCULATED.3IONS-SCNC: 10 MMOL/L (ref 7–15)
AST SERPL W P-5'-P-CCNC: 28 U/L (ref 0–45)
BASOPHILS # BLD AUTO: 0.1 10E3/UL (ref 0–0.2)
BASOPHILS NFR BLD AUTO: 1 %
BILIRUB SERPL-MCNC: 0.6 MG/DL
BUN SERPL-MCNC: 20 MG/DL (ref 8–23)
CALCIUM SERPL-MCNC: 9.6 MG/DL (ref 8.8–10.4)
CHLORIDE SERPL-SCNC: 104 MMOL/L (ref 98–107)
CREAT SERPL-MCNC: 1.02 MG/DL (ref 0.67–1.17)
EGFRCR SERPLBLD CKD-EPI 2021: 78 ML/MIN/1.73M2
EOSINOPHIL # BLD AUTO: 0.2 10E3/UL (ref 0–0.7)
EOSINOPHIL NFR BLD AUTO: 2 %
ERYTHROCYTE [DISTWIDTH] IN BLOOD BY AUTOMATED COUNT: 12.9 % (ref 10–15)
GLUCOSE SERPL-MCNC: 124 MG/DL (ref 70–99)
HCO3 SERPL-SCNC: 25 MMOL/L (ref 22–29)
HCT VFR BLD AUTO: 36.4 % (ref 40–53)
HGB BLD-MCNC: 12.1 G/DL (ref 13.3–17.7)
HOLD SPECIMEN: NORMAL
IMM GRANULOCYTES # BLD: 0 10E3/UL
IMM GRANULOCYTES NFR BLD: 0 %
LYMPHOCYTES # BLD AUTO: 2.5 10E3/UL (ref 0.8–5.3)
LYMPHOCYTES NFR BLD AUTO: 37 %
MCH RBC QN AUTO: 31.8 PG (ref 26.5–33)
MCHC RBC AUTO-ENTMCNC: 33.2 G/DL (ref 31.5–36.5)
MCV RBC AUTO: 96 FL (ref 78–100)
MONOCYTES # BLD AUTO: 0.8 10E3/UL (ref 0–1.3)
MONOCYTES NFR BLD AUTO: 13 %
NEUTROPHILS # BLD AUTO: 3.1 10E3/UL (ref 1.6–8.3)
NEUTROPHILS NFR BLD AUTO: 47 %
NRBC # BLD AUTO: 0 10E3/UL
NRBC BLD AUTO-RTO: 0 /100
PLATELET # BLD AUTO: 222 10E3/UL (ref 150–450)
POTASSIUM SERPL-SCNC: 4.4 MMOL/L (ref 3.4–5.3)
PROT SERPL-MCNC: 7.1 G/DL (ref 6.4–8.3)
PSA SERPL DL<=0.01 NG/ML-MCNC: 0.33 NG/ML (ref 0–6.5)
RBC # BLD AUTO: 3.81 10E6/UL (ref 4.4–5.9)
SODIUM SERPL-SCNC: 139 MMOL/L (ref 135–145)
WBC # BLD AUTO: 6.7 10E3/UL (ref 4–11)

## 2024-12-16 PROCEDURE — 84403 ASSAY OF TOTAL TESTOSTERONE: CPT

## 2024-12-16 PROCEDURE — 82947 ASSAY GLUCOSE BLOOD QUANT: CPT

## 2024-12-16 PROCEDURE — 84153 ASSAY OF PSA TOTAL: CPT

## 2024-12-16 PROCEDURE — 85004 AUTOMATED DIFF WBC COUNT: CPT

## 2024-12-16 PROCEDURE — 85049 AUTOMATED PLATELET COUNT: CPT

## 2024-12-16 PROCEDURE — 36415 COLL VENOUS BLD VENIPUNCTURE: CPT

## 2024-12-18 LAB — TESTOST SERPL-MCNC: 9 NG/DL (ref 240–950)

## 2024-12-19 ENCOUNTER — INFUSION THERAPY VISIT (OUTPATIENT)
Dept: INFUSION THERAPY | Facility: CLINIC | Age: 74
End: 2024-12-19
Attending: INTERNAL MEDICINE
Payer: COMMERCIAL

## 2024-12-19 ENCOUNTER — ONCOLOGY VISIT (OUTPATIENT)
Dept: ONCOLOGY | Facility: CLINIC | Age: 74
End: 2024-12-19
Attending: INTERNAL MEDICINE
Payer: COMMERCIAL

## 2024-12-19 VITALS
HEIGHT: 68 IN | DIASTOLIC BLOOD PRESSURE: 84 MMHG | RESPIRATION RATE: 16 BRPM | BODY MASS INDEX: 35.72 KG/M2 | WEIGHT: 235.67 LBS | SYSTOLIC BLOOD PRESSURE: 154 MMHG | HEART RATE: 61 BPM | OXYGEN SATURATION: 96 %

## 2024-12-19 VITALS
HEIGHT: 68 IN | BODY MASS INDEX: 35.71 KG/M2 | RESPIRATION RATE: 16 BRPM | WEIGHT: 235.6 LBS | DIASTOLIC BLOOD PRESSURE: 84 MMHG | HEART RATE: 61 BPM | SYSTOLIC BLOOD PRESSURE: 154 MMHG | OXYGEN SATURATION: 96 %

## 2024-12-19 DIAGNOSIS — C61 PROSTATE CANCER (H): Primary | ICD-10-CM

## 2024-12-19 DIAGNOSIS — R79.89 ELEVATED LFTS: ICD-10-CM

## 2024-12-19 DIAGNOSIS — E66.01 MORBID OBESITY (H): ICD-10-CM

## 2024-12-19 PROCEDURE — 99214 OFFICE O/P EST MOD 30 MIN: CPT | Performed by: INTERNAL MEDICINE

## 2024-12-19 PROCEDURE — G0463 HOSPITAL OUTPT CLINIC VISIT: HCPCS | Mod: 25 | Performed by: INTERNAL MEDICINE

## 2024-12-19 PROCEDURE — 250N000011 HC RX IP 250 OP 636: Mod: JZ | Performed by: INTERNAL MEDICINE

## 2024-12-19 PROCEDURE — G2211 COMPLEX E/M VISIT ADD ON: HCPCS | Performed by: INTERNAL MEDICINE

## 2024-12-19 RX ADMIN — LEUPROLIDE ACETATE 45 MG: KIT at 10:11

## 2024-12-19 ASSESSMENT — PAIN SCALES - GENERAL
PAINLEVEL_OUTOF10: NO PAIN (0)
PAINLEVEL_OUTOF10: NO PAIN (0)

## 2024-12-19 NOTE — PROGRESS NOTES
[facility] CANCER CLINIC  FOLLOW-UP VISIT NOTE    PATIENT NAME: Boaz Acosta MRN # [id number]  DATE OF VISIT: Dec 19, 2024 YOB: 1950    REFERRING PROVIDER: Rocío Boss MD  [address] [address]    CANCER TYPE: Prostate adenocarcinoma  STAGE: IV  ECOG PS: 0    TREATMENT SUMMARY:  Boaz's annual screening PSA was noted to be elevated at 17 in 2012, and 21 in the following year and it was attributed to prostatitis. Eventually, he did change his primary care physician and his repeat PSA was noted to be elevated at 31 in 04/2014. He was then referred to Urology, and was seen by Dr. Pressley with Urology in 07/2014. He was worked up with a biopsy of the prostate on 08/01//2014, which revealed adenocarcinoma of the prostate with Willis score of 4+3 involving 12 of the 12 cores. He was staged with a PET/CT scan, which did not show any evidence of distant metastasis. There was evidence of increased FDG uptake throughout the prostate with a maximum SUV of 7.1. He had a bone scan done on 08/19/2014, which did not show any obvious evidence of metastasis. Mr. Acosta underwent a robotic-assisted radical prostatectomy and lymph node sampling performed by Dr. Hesham Prieto on 10/24/2014. The pathology from this revealed adenocarcinoma of the prostate with a Lamona of 7 and a tertiary pattern of 5. There was extensive prostatic tissue involvement with over 75% of the tissue with evidence of disease. There was extensive extraprostatic extension involving the apex, right and left anterior and posterior base, and soft tissue around both seminal vesicles. There was bilateral seminal vesicle invasion with involvement of vas deferens. Margins were involved with bladder neck and bilateral posterior seminal vesicle soft tissue apex. There was evidence of perineural invasion. The lymph node sample was negative for disease. His postoperative PSA  in 12/2014 remained elevated at 7.1, and all the subsequent PSAs have remained positive. His restaging scans done in January and again in 03/2015, including a CT abdomen and pelvis and bone scans, have been negative. He was referred to Radiation Oncology for consideration of salvage radiation therapy, and was seen by Dr. Murphy on 03/24/2015. Dr. Murphy referred him to Dr. Zbigniew Chu at the [facility] to have a choline-11 PET/CT scan done. The choline-11 scan done at [facility] demonstrated evidence of 2 retroperitoneal lymph nodes that are mildly positive for uptake. There was no abnormal uptake in the prostate bed to suggest local recurrence. He was started on bicalutamide, and received his first dose of Lupron on 05/12/2015.   He comes for a scheduled follow up visit     5/12/2015 9/2/2015 1/4/2016 6/20/2019 7/30/2020   leuprolide  IM 30 mg 30 mg 30 mg 30 mg 30 mg      7/15/2021  9:43 AM 1/5/2023  8:57 AM   leuprolide (LUPRON DEPOT) IM 45 mg  45 mg      CURRENT TREATMENT  Off period of intermittent androgen deprivation therapy    SUBJECTIVE   Boaz comes for a scheduled follow up visit on androgen ablation therapy.     Boaz was seen in person.  He has been in good health since last visit.      He has been trying to lose weight but has not made any progress in last 6 months or even a year.     He is aware of his labs including his PSA.      He has been doing well and denies any other complains.      Wt Readings from Last 10 Encounters:   12/19/24 106.9 kg (235 lb 9.6 oz)   09/10/24 107 kg (236 lb)   06/20/24 110.7 kg (244 lb 0.8 oz)   06/20/24 110.7 kg (244 lb)   05/04/24 110.5 kg (243 lb 9 oz)   12/14/23 115.1 kg (253 lb 12.8 oz)   11/30/23 111.6 kg (246 lb)   07/20/23 108.7 kg (239 lb 9.6 oz)   01/05/23 107 kg (236 lb)   10/18/22 113.5 kg (250 lb 3.2 oz)       PAST MEDICAL HISTORY   Prostate cancer with disease metastatic to para-aortic nodes   Borderline HTN  ADALID an CPAP at night  GERD      CURRENT OUTPATIENT MEDICATIONS  "    Current Outpatient Medications   Medication Sig    ascorbic acid (VITAMIN C) 1000 MG TABS Take 1-2 tablets by mouth as needed     indomethacin (INDOCIN) 50 MG capsule Take 1 capsule (50 mg) by mouth 2 times daily (with meals) Take as needed for Gout.    Leuprolide Acetate, 4 Month, (LUPRON DEPOT, 4-MONTH, IM) Inject 45 mg into the muscle    lisinopril (ZESTRIL) 30 MG tablet     UNABLE TO FIND MEDICATION NAME: mushroom supplement that helps to limit tumor growth    UNABLE TO FIND MEDICATION NAME: Turkey tail supplement     No current facility-administered medications for this visit.         ALLERGIES     Allergies   Allergen Reactions    Chlorthalidone      Only issue when he is using Lupron- vision disturbances          REVIEW OF SYSTEMS   As above in the HPI, o/w complete 12-point ROS was negative.     PHYSICAL EXAM   BP (!) 154/84   Pulse 61   Resp 16   Ht 1.727 m (5' 7.99\")   Wt 106.9 kg (235 lb 9.6 oz)   SpO2 96%   BMI 35.83 kg/m      SpO2 Readings from Last 4 Encounters:   06/18/18 96%   10/30/17 97%   10/16/17 97%   06/19/17 96%     Wt Readings from Last 3 Encounters:   09/10/24 107 kg (236 lb)   06/20/24 110.7 kg (244 lb 0.8 oz)   06/20/24 110.7 kg (244 lb)     GEN: NAD  HEENT: PERRL, EOMI, no icterus, injection or pallor. Oropharynx is clear.  NECK: no cervical or supraclavicular lymphadenopathy  LUNGS: clear bilaterally  CV: regular, no murmurs, rubs, or gallops  ABDOMEN: soft, non-tender, non-distended, normal bowel sounds, no hepatosplenomegaly by percussion or palpation  EXT: warm, well perfused, no edema  NEURO: alert  SKIN: lesion in subxiphoid (epigastrium) as below     LABORATORY AND IMAGING STUDIES     Recent Labs   Lab Test 12/16/24  0943 06/17/24  0854 11/24/23  1320 11/24/23  1301 07/03/23  0857 01/02/23  0845    137  --  138 141 140   POTASSIUM 4.4 4.9  --  5.2 4.4 4.2   CHLORIDE 104 102  --  104 104 109   CO2 25 27  --  24 26 27   ANIONGAP 10 8  --  10 11 4   BUN 20.0 9.5  --  " "17.9 18.0 14   CR 1.02 1.09 1.2 1.04 1.07 0.89   * 117*  --  113* 135* 102*   KERRY 9.6 9.4  --  9.4 8.6* 9.3     No results for input(s): \"MAG\", \"PHOS\" in the last 72328 hours.  Recent Labs   Lab Test 12/16/24  0943 06/17/24  0854 11/24/23  1301 07/03/23  0857 01/02/23  0845   WBC 6.7 7.1 7.1 6.9 7.4   HGB 12.1* 13.7 13.0* 12.3* 12.8*    269 279 228 237   MCV 96 97 98 94 95   NEUTROPHIL 47 50 54 57 48     Recent Labs   Lab Test 12/16/24  0943 06/17/24  0854 11/24/23  1301 07/03/23  0857 01/02/23  0845 01/02/23  0845   BILITOTAL 0.6 0.8 0.4 0.8  --  0.5   ALKPHOS 59 72 55 67  --  57   ALT 20 17 24 21  --  30   AST 28 23 31 31  --  24   ALBUMIN 4.2 4.2 4.4 4.3   < > 3.5   LDH  --   --  212 203  --  154    < > = values in this interval not displayed.     TSH   Date Value Ref Range Status   03/14/2005 2.22 0.4 - 5.0 mU/L Final     No results for input(s): \"CEA\" in the last 01735 hours.  Results for orders placed or performed in visit on 11/28/23   NM Bone Scan Whole Body    Narrative    hhEXAMINATION: NM BONE SCAN WHOLE BODY  Whole-body bone scan, 11/28/2023 9:02 AM     HISTORY: Restaging - bony metastasis; Prostate cancer (H)     ADDITIONAL INFORMATION: Prostate cancer with metastasis to  retroperitoneal nodes with persistent PSA elevation post  prostatectomy; ish 4+3 disease, ?    COMPARISON: 3/18/2015 dated bone scan.    Correlation: 11/24/2023 CT    TECHNIQUE: The patient received 23.52 mCi of Tc-99m MDP intravenously.  Whole body bone images were obtained at 3 hours.    FINDINGS:     There are multifocal areas of radiotracer uptake along the right  lateral aspect of the thoracic and lumbar spine. On recent CT there  are bridging osteophytes at theses levels. Previously seen uptake in  the upper cervical spine on the left on posterior view is resolved.  Degenerative changes related uptake in the left knee, right ankle,  left midfoot and right base of the first big toe. Mild uptake by  bilateral " shoulder joints again likely degenerative.   Physiologic tracer excretion through the kidneys and urinary bladder.      Impression    IMPRESSION: Technetium 99m whole body bone scan demonstrates no focal  suspicious uptake throughout skeleton to suggest osteoblastic  metastasis.     BONNIE QUIROGA MD         SYSTEM ID:  [id number]     Recent Labs   Lab Test 12/16/24  0943 06/17/24  0854 11/24/23  1301 07/03/23  0857 01/02/23  0845   PSA 0.33 16.00* 1.08 0.26 6.12*   TESTOSTTOTAL 9* 541 611 8* 578       ASSESSMENT AND PLAN   Prostate cancer with metastasis to retroperitoneal nodes with persistent PSA elevation post prostatectomy; ish 4+3 disease, tertiary score of 5  Elevated LFT's, obesity, elevated blood pressure at this visit  ECOG PS 0  No other medical comorbiditiies    Boaz is alone at this clinic visit.  He has been doing very well since the last visit.  He has no physical complaints at this visit.    I have reviewed all of the labs done prior to this clinic visit.  Labs are all completely normal including electrolytes, renal function, complete blood count and differential. He previously had mild normocytic anemia which has resolved. His hepatic panel which had revealed persistent elevation in his transaminases has resolved at this visit.     He is aware of his PSA values.  His PSA has gone up to 16 ng/ml from 1.08 ng/ml merely 6 months ago.  His PSA has doubled almost 4 times in last 6 months. I will switch him to continuous ADT. We will have to continually supppress his testosterone now as doubling time of 3 months or less is a poor prognostic sign and he has a doubling time of 6 weeks. I will administer leuprolide today and start him on bicalutamide daily for 2 weeks to cover for testosterone flare.     I have encouraged him to keep working on his weight.     I will again see him in 6 months with labs a week prior to visit including CBC, CMP, PSA, testosterone.     The longitudinal plan of care for the  diagnosis(es)/condition(s) as documented were addressed during this visit. Due to the added complexity in care, I will continue to support Boaz in the subsequent management and with ongoing continuity of care.    30 minutes spent on the date of the encounter doing chart review, history and exam, documentation and further activities as noted above      Pavel Norton    Hematologist and Medical Oncologist  [facility]

## 2024-12-19 NOTE — NURSING NOTE
"Oncology Rooming Note    December 19, 2024 9:46 AM   Boaz Acosta is a 74 year old male who presents for:    Chief Complaint   Patient presents with    Oncology Clinic Visit     6 month follow up     Initial Vitals: BP (!) 154/84   Pulse 61   Resp 16   Ht 1.727 m (5' 7.99\")   Wt 106.9 kg (235 lb 9.6 oz)   SpO2 96%   BMI 35.83 kg/m   Estimated body mass index is 35.83 kg/m  as calculated from the following:    Height as of this encounter: 1.727 m (5' 7.99\").    Weight as of this encounter: 106.9 kg (235 lb 9.6 oz). Body surface area is 2.26 meters squared.  No Pain (0) Comment: Data Unavailable   No LMP for male patient.  Allergies reviewed: Yes  Medications reviewed: Yes    Medications: Medication refills not needed today.  Pharmacy name entered into [employer]: [id number] [place] [address]    Frailty Screening:   Is the patient here for a new oncology consult visit in cancer care? 2. No      Clinical concerns: results       Donna Parnell LPN              "

## 2025-01-22 ENCOUNTER — MYC MEDICAL ADVICE (OUTPATIENT)
Dept: FAMILY MEDICINE | Facility: CLINIC | Age: 75
End: 2025-01-22
Payer: COMMERCIAL

## 2025-01-22 DIAGNOSIS — M1A.9XX1 CHRONIC GOUT INVOLVING TOE OF LEFT FOOT WITH TOPHUS, UNSPECIFIED CAUSE: ICD-10-CM

## 2025-01-22 RX ORDER — ALLOPURINOL 100 MG/1
100 TABLET ORAL DAILY
Qty: 90 TABLET | Refills: 1 | OUTPATIENT
Start: 2025-01-22

## 2025-01-22 NOTE — TELEPHONE ENCOUNTER
Disp Refills Start End ULISES   allopurinol (ZYLOPRIM) 100 MG tablet 90 tablet 1 9/10/2024 -- No   Sig - Route: Take 1 tablet (100 mg) by mouth daily. - Oral   Sent to pharmacy as: Allopurinol 100 MG Oral Tablet (ZYLOPRIM)   Class: E-Prescribe   Order: [id number]     Informed patient he has another refill on file.    AMOL ToN JADIEL  [facility]

## 2025-01-23 DIAGNOSIS — M1A.9XX1 CHRONIC GOUT INVOLVING TOE OF LEFT FOOT WITH TOPHUS, UNSPECIFIED CAUSE: ICD-10-CM

## 2025-01-23 RX ORDER — ALLOPURINOL 100 MG/1
100 TABLET ORAL DAILY
Qty: 90 TABLET | Refills: 1 | OUTPATIENT
Start: 2025-01-23

## 2025-01-23 NOTE — TELEPHONE ENCOUNTER
Routing MyChart message to PCP    Patient is running out of allopurinol and needs a refill. Please update chart with new prescription for allopurinol reflecting dose change as noted in MyChart message dated 10/14/24:    Gianluca Singh MD to Boaz Acosta   MM      10/15/24  8:49 AM  Boaz     Thanks for letting me know.  200mg should be fine.     Gianluca Singh MD     Last read by Boaz Acosta at  9:29 AM on 10/15/2024.      Ira PALENCIA RN  [facility] Triage

## 2025-02-24 ENCOUNTER — TELEPHONE (OUTPATIENT)
Dept: FAMILY MEDICINE | Facility: CLINIC | Age: 75
End: 2025-02-24
Payer: COMMERCIAL

## 2025-02-24 NOTE — TELEPHONE ENCOUNTER
Reason for Call:  Appointment Request    Patient requesting this type of appt:  office visit    Requested provider: Gianluca Powell    Reason patient unable to be scheduled: Not within requested timeframe    When does patient want to be seen/preferred time:  Open for cancellations    Comments:  Patient would like to be put on the cancellation list. He has an aapt for 03/10, but would like to be seen sooner for pain mgt on neck and lower back pain.     Could we send this information to you in [employer] or would you prefer to receive a phone call?:   Patient would prefer a phone call   Okay to leave a detailed message?: Yes at Cell number on file:    Telephone Information:   Mobile [phone]   Mobile [phone]       Call taken on 2/24/2025 at 8:32 AM by ZARA CLEMONS

## 2025-02-27 ENCOUNTER — OFFICE VISIT (OUTPATIENT)
Dept: FAMILY MEDICINE | Facility: CLINIC | Age: 75
End: 2025-02-27
Payer: COMMERCIAL

## 2025-02-27 VITALS
BODY MASS INDEX: 37.74 KG/M2 | RESPIRATION RATE: 16 BRPM | HEIGHT: 68 IN | OXYGEN SATURATION: 99 % | TEMPERATURE: 97.8 F | SYSTOLIC BLOOD PRESSURE: 181 MMHG | DIASTOLIC BLOOD PRESSURE: 84 MMHG | HEART RATE: 60 BPM | WEIGHT: 249 LBS

## 2025-02-27 DIAGNOSIS — Z23 NEED FOR SHINGLES VACCINE: ICD-10-CM

## 2025-02-27 DIAGNOSIS — Z23 NEED FOR TDAP VACCINATION: ICD-10-CM

## 2025-02-27 DIAGNOSIS — Z87.81 HX OF FRACTURE OF FEMUR: ICD-10-CM

## 2025-02-27 DIAGNOSIS — G89.29 CHRONIC BILATERAL LOW BACK PAIN WITHOUT SCIATICA: Primary | ICD-10-CM

## 2025-02-27 DIAGNOSIS — S12.9XXS CERVICAL COMPRESSION FRACTURE, SEQUELA: ICD-10-CM

## 2025-02-27 DIAGNOSIS — C78.6 SECONDARY MALIGNANT NEOPLASM OF RETROPERITONEUM AND PERITONEUM (H): ICD-10-CM

## 2025-02-27 DIAGNOSIS — M54.50 CHRONIC BILATERAL LOW BACK PAIN WITHOUT SCIATICA: Primary | ICD-10-CM

## 2025-02-27 DIAGNOSIS — E66.01 MORBID OBESITY (H): ICD-10-CM

## 2025-02-27 DIAGNOSIS — M1A.9XX1 CHRONIC GOUT INVOLVING TOE OF LEFT FOOT WITH TOPHUS, UNSPECIFIED CAUSE: ICD-10-CM

## 2025-02-27 DIAGNOSIS — M10.9 ACUTE GOUT OF RIGHT ANKLE, UNSPECIFIED CAUSE: ICD-10-CM

## 2025-02-27 DIAGNOSIS — M54.2 CHRONIC NECK PAIN: ICD-10-CM

## 2025-02-27 DIAGNOSIS — Z29.11 NEED FOR VACCINATION AGAINST RESPIRATORY SYNCYTIAL VIRUS: ICD-10-CM

## 2025-02-27 DIAGNOSIS — G89.29 CHRONIC NECK PAIN: ICD-10-CM

## 2025-02-27 LAB — URATE SERPL-MCNC: 5.3 MG/DL (ref 3.4–7)

## 2025-02-27 RX ORDER — TRAMADOL HYDROCHLORIDE 50 MG/1
50 TABLET ORAL EVERY 6 HOURS PRN
Qty: 10 TABLET | Refills: 0 | Status: SHIPPED | OUTPATIENT
Start: 2025-02-27 | End: 2025-03-02

## 2025-02-27 ASSESSMENT — ENCOUNTER SYMPTOMS: BACK PAIN: 1
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
## 2025-03-03 ENCOUNTER — HOSPITAL ENCOUNTER (OUTPATIENT)
Dept: GENERAL RADIOLOGY | Facility: HOSPITAL | Age: 75
Discharge: HOME | End: 2025-03-03
Attending: PAIN MEDICINE | Admitting: PAIN MEDICINE
Payer: COMMERCIAL

## 2025-03-03 ENCOUNTER — OFFICE VISIT (OUTPATIENT)
Dept: PHYSICAL MEDICINE AND REHAB | Facility: CLINIC | Age: 75
End: 2025-03-03
Attending: FAMILY MEDICINE
Payer: COMMERCIAL

## 2025-03-03 VITALS — DIASTOLIC BLOOD PRESSURE: 82 MMHG | SYSTOLIC BLOOD PRESSURE: 184 MMHG | HEART RATE: 77 BPM | OXYGEN SATURATION: 96 %

## 2025-03-03 DIAGNOSIS — G89.29 CHRONIC NECK PAIN: ICD-10-CM

## 2025-03-03 DIAGNOSIS — M54.2 CHRONIC NECK PAIN: ICD-10-CM

## 2025-03-03 DIAGNOSIS — M54.50 CHRONIC BILATERAL LOW BACK PAIN WITHOUT SCIATICA: Primary | ICD-10-CM

## 2025-03-03 DIAGNOSIS — S12.9XXS CERVICAL COMPRESSION FRACTURE, SEQUELA: ICD-10-CM

## 2025-03-03 DIAGNOSIS — G89.29 CHRONIC BILATERAL LOW BACK PAIN WITHOUT SCIATICA: ICD-10-CM

## 2025-03-03 DIAGNOSIS — C61 PROSTATE CANCER (H): Chronic | ICD-10-CM

## 2025-03-03 DIAGNOSIS — G89.29 CHRONIC BILATERAL LOW BACK PAIN WITHOUT SCIATICA: Primary | ICD-10-CM

## 2025-03-03 DIAGNOSIS — M54.50 CHRONIC BILATERAL LOW BACK PAIN WITHOUT SCIATICA: ICD-10-CM

## 2025-03-03 PROCEDURE — 1125F AMNT PAIN NOTED PAIN PRSNT: CPT | Performed by: PAIN MEDICINE

## 2025-03-03 PROCEDURE — 72040 X-RAY EXAM NECK SPINE 2-3 VW: CPT

## 2025-03-03 PROCEDURE — 3077F SYST BP >= 140 MM HG: CPT | Performed by: PAIN MEDICINE

## 2025-03-03 PROCEDURE — 99204 OFFICE O/P NEW MOD 45 MIN: CPT | Performed by: PAIN MEDICINE

## 2025-03-03 PROCEDURE — 3079F DIAST BP 80-89 MM HG: CPT | Performed by: PAIN MEDICINE

## 2025-03-03 PROCEDURE — 72100 X-RAY EXAM L-S SPINE 2/3 VWS: CPT

## 2025-03-03 RX ORDER — MELOXICAM 15 MG/1
15 TABLET ORAL DAILY
Qty: 30 TABLET | Refills: 1 | Status: SHIPPED | OUTPATIENT
Start: 2025-03-03

## 2025-03-03 ASSESSMENT — PAIN SCALES - GENERAL: PAINLEVEL_OUTOF10: MILD PAIN (3)
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
## 2025-03-03 NOTE — PATIENT INSTRUCTIONS
I ordered x-rays of your neck and low back.  Once have reviewed these I will send you a MyChart note with results and recommendations.    I recommend that you call physical therapy and get scheduled for therapy for your neck and low back.    I ordered meloxicam 15 mg that you can take daily as needed with food.  Please do not take ibuprofen when you are taking meloxicam.    ~Please call Nurse Navigation line [phone] with any questions or concerns about your treatment plan, if symptoms worsen and you would like to be seen urgently, or if you have problems controlling bladder and bowel function.
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
## 2025-03-04 ASSESSMENT — ANXIETY QUESTIONNAIRES
GAD7 TOTAL SCORE: 0
3. WORRYING TOO MUCH ABOUT DIFFERENT THINGS: NOT AT ALL
6. BECOMING EASILY ANNOYED OR IRRITABLE: NOT AT ALL
7. FEELING AFRAID AS IF SOMETHING AWFUL MIGHT HAPPEN: NOT AT ALL
4. TROUBLE RELAXING: NOT AT ALL
5. BEING SO RESTLESS THAT IT IS HARD TO SIT STILL: NOT AT ALL
7. FEELING AFRAID AS IF SOMETHING AWFUL MIGHT HAPPEN: NOT AT ALL
1. FEELING NERVOUS, ANXIOUS, OR ON EDGE: NOT AT ALL
2. NOT BEING ABLE TO STOP OR CONTROL WORRYING: NOT AT ALL
GAD7 TOTAL SCORE: 0
GAD7 TOTAL SCORE: 0

## 2025-03-04 ASSESSMENT — PAIN SCALES - PAIN ENJOYMENT GENERAL ACTIVITY SCALE (PEG)
INTERFERED_ENJOYMENT_LIFE: 7
AVG_PAIN_PASTWEEK: 8
PEG_TOTALSCORE: 7.67
INTERFERED_GENERAL_ACTIVITY: 8
INTERFERED_ENJOYMENT_LIFE: 7
AVG_PAIN_PASTWEEK: 8
PEG_TOTALSCORE: 7.67
INTERFERED_GENERAL_ACTIVITY: 8

## 2025-03-05 ENCOUNTER — OFFICE VISIT (OUTPATIENT)
Dept: PALLIATIVE MEDICINE | Facility: CLINIC | Age: 75
End: 2025-03-05
Attending: FAMILY MEDICINE
Payer: COMMERCIAL

## 2025-03-05 VITALS — DIASTOLIC BLOOD PRESSURE: 80 MMHG | SYSTOLIC BLOOD PRESSURE: 180 MMHG | HEART RATE: 66 BPM

## 2025-03-05 DIAGNOSIS — G89.29 CHRONIC NECK PAIN: ICD-10-CM

## 2025-03-05 DIAGNOSIS — S12.9XXS CERVICAL COMPRESSION FRACTURE, SEQUELA: ICD-10-CM

## 2025-03-05 DIAGNOSIS — M54.2 CHRONIC NECK PAIN: ICD-10-CM

## 2025-03-05 DIAGNOSIS — M54.50 CHRONIC BILATERAL LOW BACK PAIN WITHOUT SCIATICA: ICD-10-CM

## 2025-03-05 DIAGNOSIS — G89.29 CHRONIC BILATERAL LOW BACK PAIN WITHOUT SCIATICA: ICD-10-CM

## 2025-03-05 PROCEDURE — 99205 OFFICE O/P NEW HI 60 MIN: CPT | Performed by: NURSE PRACTITIONER

## 2025-03-05 PROCEDURE — 3077F SYST BP >= 140 MM HG: CPT | Performed by: NURSE PRACTITIONER

## 2025-03-05 PROCEDURE — 1125F AMNT PAIN NOTED PAIN PRSNT: CPT | Performed by: NURSE PRACTITIONER

## 2025-03-05 PROCEDURE — 3079F DIAST BP 80-89 MM HG: CPT | Performed by: NURSE PRACTITIONER

## 2025-03-05 ASSESSMENT — PAIN SCALES - GENERAL: PAINLEVEL_OUTOF10: MILD PAIN (2)

## 2025-03-05 NOTE — PATIENT INSTRUCTIONS
PATIENT INSTRUCTIONS:     I am recommending a multidisciplinary treatment plan to help this patient better manage his pain. The following recommendations were given to the patient. Diagnosis, treatment options, risks, benefits, and alternatives were discussed; all questions were answered. Self-care instructions were given. The patient expressed understanding of the plan for management.     Continue Current Treatment Plan with:   - Existing medications, as prescribed by current prescribers    - CONTINUE with evaluation and treatment plan through Physical Therapy      Return to Clinic:   -  No additional follow-up with Pain Management Program.  Return to your referring provider for ongoing care.      Future Considerations:   You can return to Pain Management in the future if your pain worsens or changes.   I recommend purchasing a TENS unit as a tool for pain management.     ----------------------------------------------------------------  Clinic Number:  [phone]   Call with any questions about your care and for scheduling assistance.   Calls are returned Monday through Friday between 8 AM and 4:30 PM. We usually get back to you within 2 business days depending on the issue/request.    If we are prescribing your medications:  For opioid medication refills, call the clinic or send a [employer] message 7 days in advance.  Please include:  Name of requested medication  Name of the pharmacy.  For non-opioid medications, call your pharmacy directly to request a refill. Please allow 3-4 days to be processed.   Per [place] State Law:  All controlled substance prescriptions must be filled within 30 days of being written.    For those controlled substances allowing refills, pickup must occur within 30 days of last fill.      We believe regular attendance is key to your success in our program!    Any time you are unable to keep your appointment we ask that you call us at least 24 hours in advance to cancel.This will allow us to  offer the appointment time to another patient.   Multiple missed appointments may lead to dismissal from the clinic.

## 2025-03-05 NOTE — PROGRESS NOTES
"[facility]   Comprehensive Pain Evaluation     Date of Visit: Mar 5, 2025     CHIEF COMPLAINT:    Chief Complaint   Patient presents with    Pain          REASON FOR VISIT:    Boaz Acosta is a 74 year old male who is seen today at the request of Gianluca Pal MD (PCP) for evaluation of his pain issues and recommendations for management; with specific emphasis on:  chronic bilateral low back pain and chronic neck pain        Subjective    HISTORY OF PRESENT ILLNESS:  Onset/Progression:   Boaz Acosta is a 74 year old male with history of T12 compression fracture, stage 4 prostate cancer, lumbar DDD.   Completed Spine consultation on 3/03/25 with Dr. Cory Calero, who started PT order, Meloxicam 15mg and Lumbar XR.   States \"I'm feeling much better in the last two days.\"   Pain was worse over past 5 weeks but improving.   Neck pain started 5 weeks ago after straining neck due to overactivity; lat machine pull down in gym.   Felt muscles in cervical spine \"on fire\" for several days.   Medications used to treat state 4 prostate cancer can impact ongoing muscle strain when over exercising.   Pain spreads from cervical spine into lumbar region.  Has sensations of L5-S1 \"moving around\" and causes severe, almost debilitating pain for several hours.  Hx of L5-S1 severe DDD.   Feels Meloxicam 15mg has been very helpful since starting two days ago.   Feels low back pain in bilateral SI, left worse than right.   Feels pain is midline at lumbosacral region; \"it feels like I'm breaking in half.\"   Denies numbness, tingling or pain shooting into legs.   Patient's pain goal is to stabilize core strength to \"protect\" lumbar spine.  Pain changes throughout the day.        Pain score today: Mild Pain (2)   Pain rating: intensity ranges from 0/10 to 9/10 on a 0-10 scale.  Pain Description and Location: neck and low back       Quality: (Patient-Rptd) burning, sharp   Duration: (Patient-Rptd) " AM   Severity/Intensity (0 = No pain to 10 = Worst pain imaginable)   Now: (Patient-Rptd) 3, Average: (Patient-Rptd) 7, Best: (Patient-Rptd) 2, Worst: (Patient-Rptd) 7  Aggravating factors include: (Patient-Rptd) sitting, coughing / sneezing  Relieving factors include: (Patient-Rptd) lying down, standing, walking, exercise, medication     CAMILLE score: Answers submitted by the patient for this visit:  Patient Health Questionnaire (G7) (Submitted on 3/4/2025)  CAMILLE 7 TOTAL SCORE: 0       Past Pain Treatments:   Pain Clinic:   No    Physical therapy: Yes  - starting soon   Psychologist: No   Chiropractor: Yes   Acupuncture: Yes - several years ago   Pharmacotherapy:    Opioids: No     Non-opioids:  Yes  TENs Unit/electric stim: Yes helpful    Heat/Cold: Yes - heat for neck; ice for low back     Injections/clinic procedures: No        Surgeries related to pain: No       Current Pain Relevant Medications:    Meloxicam 15mg daily   Tramadol 50mg (has not taken a dose yet)     Other Relevant Medications:   Prednisone 10mg - for gout flare     Previous Pain Relevant Medications: (H--helped; HI--Helped initially; S[facility]--Somewhat helpful; NH--No help; W--worse; SE--side effects; A--allergy; ?--Unsure if helpful)   Opioids: codeine (H), oxycodone (H), demerol (H)   NSAIDs: Meloxicam (H), Ibuprofen (H), Indomethacin (H), Naproxen (SWH)  Muscle relaxants: has not tried    Neuroleptics: None  Pain Antidepressants/ Anxiolytics: None  Sleep Aids: has not tried  None  Migraine Medications: has not tried None   Topical:  Diclofenac (Voltaren) gel, Lidoderm patch   Adjuvant medications: Acetaminophen (NH)          Review of [place] Prescription Monitoring Program ():  reviewed on 3/05/25. No concern for abuse or misuse of controlled medications based on this report. Controlled substances prescribed in the past 6 months include: (Tramadol 50mg)     Current MME: 0 / day    Current PDMP reportable controlled substance medications, if  any, are being prescribed by: PCP    Primary Care Provider is Gianluca Powell           Past Medical History   He  has a past medical history of Elevated prostate specific antigen (PSA) (5/30/2014), History of blood transfusion, Hypertension (several years), Idiopathic chronic 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 (9/25/2019), Lyme disease, Prostate cancer (H) (10/24/2014), and SCC (squamous cell carcinoma), face (12/12/2012).   Past Surgical History   He  has a past surgical history that includes Mohs micrographic procedure; hernia repair; orthopedic surgery (Right); orthopedic surgery (Right); orthopedic surgery (Left); Davinci prostatectomy (N/A, 10/24/2014); Davinci lymphadenectomy (Bilateral, 10/24/2014); and colonoscopy.   Social History   Social History     Tobacco Use    Smoking status: Former     Types: Cigars     Passive exposure: Past (Dad smoked while growing up and everyone smoked everywhere at the time)    Smokeless tobacco: Never    Tobacco comments:     Only occassional cigars   Vaping Use    Vaping status: Never Used   Substance Use Topics    Alcohol use: Yes     Comment: 1-2 beers or a glass of wine per day    Drug use: No      Social History     Social History Narrative    Not on file        Current Outpatient Medications   Medication Sig Dispense Refill    allopurinol (ZYLOPRIM) 100 MG tablet Take 2 tablets (200 mg) by mouth daily. 180 tablet 1    ascorbic acid (VITAMIN C) 1000 MG TABS Take 1-2 tablets by mouth as needed       bicalutamide (CASODEX) 50 MG tablet Take 1 tablet (50 mg) by mouth daily 14 tablet 0    indomethacin (INDOCIN) 50 MG capsule Take 1 capsule (50 mg) by mouth 2 times daily (with meals). Take as needed for Gout. 60 capsule 11    Leuprolide Acetate, 4 Month, (LUPRON DEPOT, 4-MONTH, IM) Inject 45 mg into the muscle      lisinopril (ZESTRIL) 30 MG tablet TAKE 1 TABLET BY MOUTH EVERY DAY 90 tablet 3    meloxicam (MOBIC) 15 MG tablet Take 1 tablet (15 mg) by mouth  daily. 30 tablet 1    ORDER FOR DME, SET TO FAX, PATIENT WAS SET UP ON A [employer] 560 AUTO MACHINE AT [employer] WITH HEATED HUMIDITY. PATIENT HAD A CHOICE OF MASK AND CHOSE THE AIRFIT P10 SIZE MEDIUM NASAL PILLOW. HE HAS A F/U APPOINTMENT TO HIS SLEEP STUDY 2/5 WITH TURNER GLASER PA-C AND A SECOND ONE SCHEDULED IN 6 WEEKS.      UNABLE TO FIND MEDICATION NAME: mushroom supplement that helps to limit tumor growth      UNABLE TO FIND MEDICATION NAME: Turkey tail supplement      predniSONE (DELTASONE) 10 MG tablet 5 tabs PO QD x 2 days then 4 tabs PO QD x 2 days then 3 tabs PO QD x 2 days then 2 tabs PO QD x 2 days then 1 tab PO QD x 2 days (Patient not taking: Reported on 3/5/2025) 30 tablet 5     No current facility-administered medications for this visit.     Allergies   Allergen Reactions    Chlorthalidone      Only issue when he is using Lupron- vision disturbances          Medications and Allergies reviewed. Yes        REVIEW OF SYSTEMS:   ROS: 10 point ROS neg other than the symptoms noted above in the HPI.     The patient otherwise denies red flag symptoms, such as: thunderclap headache, bowel or bladder incontinence, parasthesias, weakness, saddle anesthesia, unintentional weight loss, or fever/chills/sweats.     Objective   OBJECTIVE    Vitals:    03/05/25 1027   BP: (!) 180/80   Pulse: 66         Appearance:   A&O. Patient is appropriate. Patient is in NAD.      HHENT:  Normocephalic, atraumatic. Eyes without conjunctival injection or jaundice. Neck supple.     Pulmonary:  Normal effort; no cough or audible wheeze.       Skin: No obvious rash, lesions, or petechiae of exposed skin.    Psych:  Alert, without lethargy or stupor. Speech fluent. Mood normal. Able to follow commands without difficulty. Appropriate judgement and behavior.    Tearful or anxious affect: NO   Suicidal or homicidal ideation: NO        MSK:    Gait pattern:  Patient has an antalgic gait pattern:NO  Gait favors the neither  side.  Mobility and/or assistive devices? YES  crutches     Able to walk on the heels and toes with minimal difficulty.      Strength:   Knee ext: R: 5/5  L: 5/5  Knee flex: R: 5/5  L: 5/5  Dorsiflexion: R: 5/5  L: 5/5  Plantarflexion: R: 5/5  L: 5/5  Great toe ext:  R: 5/5  L: 5/5       Neurological:   Deep Tendon Reflex exam:   Patella:  R:  2/4   L: 2/4    Sensory exam:  (Lower extremities)   Light touch: normal    Allodynia: absent   Dysethesia: absent    Hyperalgesia: absent     Cervical spine:  Tenderness in the cervical spine at midline. No  Tenderness in the cervical paraspinal muscles. No    Thoracic spine:   Kyphosis. No   Tenderness in the thoracic spine at midline. No  Tenderness in the thoracic paraspinal muscles. No    Lumbar/Sacral spine:  Range of motion by visual estimation   Forward Flexion:  40 degrees (0 - 60); painful    Ext: 5 degrees (0 - 25); painful   Rotation/ext to right: painful   Rotation/ext to left: painful   Lordosis. Yes  Tenderness in the lumbar spine at midline. Yes  Tenderness in the lumbar paraspinal muscles.Yes  Straight leg exam:  Right: positive    Left:  positive  Neural Slump test:  Right: negative    Left:  negative  Pierre/Braydon's test:     Right: not done    Left:  not done  Passive internal rotation:   Right: not done    Left: not done   Active external rotation:    Right: not done    Left: not done  Tenderness over SI joint:     Right: positive    Left:  positive  Tenderness over Trochanteric Bursa:    Right: positive    Left: positive        Diagnostic Testing:  Labs:      Lab Results   Component Value Date    CR 1.02 12/16/2024    GFRESTIMATED 78 12/16/2024    ALKPHOS 59 12/16/2024    AST 28 12/16/2024    ALT 20 12/16/2024              Imaging:     XR Lumbar Spine 3/03/25  IMPRESSION: Mild height loss involving the T12 vertebral body, probably chronic, similar compared to the prior. Otherwise, no fracture identified. Moderate to severe degenerative disc disease at  L5-S1. Background of moderate degenerative disc disease.   Moderate severe lower facet arthropathy. Multilevel grade 1 spondylolisthesis. Levoconvex curvature.    XR Cervical Spine 3/03/25  IMPRESSION: Multilevel mild-to-moderate degenerative disc disease and facet arthropathy. Multilevel subtle grade 1 spondylolisthesis.       NM Bone Scan Whole Body 11/28/23  FINDINGS:   There are multifocal areas of radiotracer uptake along the right  lateral aspect of the thoracic and lumbar spine. On recent CT there  are bridging osteophytes at theses levels. Previously seen uptake in  the upper cervical spine on the left on posterior view is resolved.  Degenerative changes related uptake in the left knee, right ankle,  left midfoot and right base of the first big toe. Mild uptake by  bilateral shoulder joints again likely degenerative.   Physiologic tracer excretion through the kidneys and urinary bladder.                                                                    IMPRESSION: Technetium 99m whole body bone scan demonstrates no focal  suspicious uptake throughout skeleton to suggest osteoblastic          Assessment & Plan      VISIT DIAGNOSIS:   1. Chronic bilateral low back pain without sciatica  - Pain Management  Referral  - Adult Pain Clinic Follow-Up Order; Future    2. Chronic neck pain  - Pain Management  Referral  - Adult Pain Clinic Follow-Up Order; Future    3. Cervical compression fracture, sequela  - Pain Management  Referral  - Adult Pain Clinic Follow-Up Order; Future        ASSESSMENT:    Visit discussion: Boaz Acosta is a 74 year old male with a past medical history significant for stage 4 prostate cancer, chronic T12 compression fracture, S5-S1 DDD who presents with complaints of midline low back pain with SI pain and neck pain.  Patient was evaluated by Dr. Calero in the med spine department on 3/3/2025.  He was started on meloxicam 15 mg daily with referral to physical  therapy for low back strengthening.  Today, Boaz states that he is feeling significantly better with the start of meloxicam.  He is already scheduled for physical therapy over the next few weeks.  At this time, there is nothing new that I would add to his pain management program.  I agree with the plan to complete physical therapy and advance to lumbar MRI if symptoms or not improving.  Patient may benefit from an interventional pain injection in the future but this can be addressed by Dr. Calero.  Encourage patient to explore use of a TENS unit for episodic low back and neck pain.  Patient does not need to return at this time.  However, he is welcome to return within the next 8 to 10 months if pain worsens or changes.  Patient verbalizes understanding and agreement with plan.      PLAN:    Continue Current Treatment Plan with:   - Existing medications, as prescribed by current prescribers    - CONTINUE with evaluation and treatment plan through Physical Therapy      Return to Clinic:   -  No additional follow-up with Pain Management Program.  Return to your referring provider for ongoing care.      Future Considerations:   You can return to Pain Management in the future if your pain worsens or changes.   I recommend purchasing a TENS unit as a tool for pain management.   ___________________________________________________________________    BILLING TIME DOCUMENTATION:   TOTAL TIME includes:   Time spent preparing to see the patient: 10 minutes (reviewing records and tests)  Time spend face to face with the patient: 47 minutes  Time spent ordering tests, medications, procedures and referrals: 0 minutes  Time spent Referring and communicating with other healthcare professionals: 0 minutes  Documenting clinical information in Epic: 5 minutes    The total TIME spent on this patient on the day of the appointment was 62 minutes.   ___________________________________________________________________    Review of  Electronic Chart, Relevant Records/History: Today I have also reviewed available medical information in the patient's medical record at [facility] ([facility]) and Care Everywhere (if available), including relevant provider notes, laboratory work, and imaging. Please see the [facility] health questionnaire which the patient completed and reviewed with me in detail.     SHAMEKA Pepe, DNP, FNP-C   [facility]

## 2025-03-11 ENCOUNTER — THERAPY VISIT (OUTPATIENT)
Dept: PHYSICAL THERAPY | Facility: CLINIC | Age: 75
End: 2025-03-11
Attending: FAMILY MEDICINE
Payer: COMMERCIAL

## 2025-03-11 DIAGNOSIS — M54.2 NECK PAIN, CHRONIC: Primary | ICD-10-CM

## 2025-03-11 DIAGNOSIS — G89.29 CHRONIC BILATERAL LOW BACK PAIN WITHOUT SCIATICA: ICD-10-CM

## 2025-03-11 DIAGNOSIS — G89.29 NECK PAIN, CHRONIC: Primary | ICD-10-CM

## 2025-03-11 DIAGNOSIS — S12.9XXS CERVICAL COMPRESSION FRACTURE, SEQUELA: ICD-10-CM

## 2025-03-11 DIAGNOSIS — G89.29 CHRONIC NECK PAIN: ICD-10-CM

## 2025-03-11 DIAGNOSIS — M54.2 CHRONIC NECK PAIN: ICD-10-CM

## 2025-03-11 DIAGNOSIS — M54.50 CHRONIC BILATERAL LOW BACK PAIN WITHOUT SCIATICA: ICD-10-CM

## 2025-03-11 PROCEDURE — 97110 THERAPEUTIC EXERCISES: CPT | Mod: GP | Performed by: PHYSICAL THERAPIST

## 2025-03-11 PROCEDURE — 97530 THERAPEUTIC ACTIVITIES: CPT | Mod: GP | Performed by: PHYSICAL THERAPIST

## 2025-03-11 PROCEDURE — 97161 PT EVAL LOW COMPLEX 20 MIN: CPT | Mod: GP | Performed by: PHYSICAL THERAPIST

## 2025-03-11 NOTE — PROGRESS NOTES
PHYSICAL THERAPY EVALUATION  Type of Visit: Evaluation       Fall Risk Screen:  Fall screen completed by: PT  Have you fallen 2 or more times in the past year?: No  Have you fallen and had an injury in the past year?: No  Is patient a fall risk?: No    Subjective         Presenting condition or subjective complaint: SI pain and pain at L5/S1 severe;  patient has history of chronic LB and neck pain (see 1 years ago in PT).  Patient reports in February he was at the gym and after working out one day he developed increase neck pain (had to hold his head with his hands due to the neck pain); gradually the pain moved from his neck to the LB region.  Patient seen by chiropractor for light stretching (which was very painful) and MD for medication.  Pain has gradually subsided to a level he can function but is still hesitant to move quickly due to fear of causing pain again.  Date of onset: 02/27/25 (MD order date)    Relevant medical history: Arthritis; Cancer; High blood pressure; History of fractures; Neck injury; Overweight; Pain at night or rest; Severe headaches; Sleep disorder like apnea   Dates & types of surgery: multiple    Prior diagnostic imaging/testing results: CT scan; X-ray; Bone scan     Prior therapy history for the same diagnosis, illness or injury: Yes 2024 March PT    Prior Level of Function  Transfers: Independent  Ambulation: Independent  ADL: Independent  IADL: Driving, Housekeeping, Laundry, Meal preparation    Living Environment  Social support: Alone   Type of home: House; Multi-level   Stairs to enter the home: No       Ramp: No   Stairs inside the home: Yes 3 Is there a railing: Yes     Help at home: None  Equipment owned: Crutches     Employment: Not Applicable    Hobbies/Interests: hunting fishing [employer]    Patient goals for therapy: most of my activities    Pain assessment: See objective evaluation for additional pain details     Objective   CERVICAL and LUMBAR SPINE EVALUATION  PAIN: Pain  Level at Rest: 2/10  Pain Level with Use: 3/10  Pain Location: cervical spine and lumbar spine  Pain Quality: Aching  Pain Frequency: constant or daily  Pain is Exacerbated By: sit to stand, quickly moving, working out    POSTURE: Standing Posture: Rounded shoulders, Forward head, Lordosis decreased, Thoracic kyphosis increased  GAIT:   Weightbearing Status: WBAT  Assistive Device(s): None  Gait Deviations: Antalgic  ROM:   (Degrees) Left AROM Right AROM    Cervical Flexion Min loss; pulling back of neck    Cervical Extension Mod loss; pain    Cervical Side bend Mod loss; pain Mod loss; pain    Cervical Rotation Mod loss; pain Mod loss; pain    Cervical Protrusion     Cervical Retraction     Thoracic Flexion     Thoracic Extension     Thoracic Rotation             Hip Flexion 90 90     Hip Abduction              Lumbar Side Bend Mid thigh Mid thigh     Lumbar Flexion Mid thigh Mid thigh     Lumbar Extension NT NT                     MYOTOMES:    Left Right   T12-L3 (Hip Flexion) 5 5   L2-4 (Quads)  5 5   L4 (Ankle DF)     L5 (Great Toe Ext)     S1 (Toe Raise)           Assessment & Plan   CLINICAL IMPRESSIONS  Medical Diagnosis: Chronic bilateral low back pain without sciatica  Chronic neck pain  Cervical compression fracture, sequela    Treatment Diagnosis: chronic LB and Neck pain   Impression/Assessment: Patient is a 74 year old male with neck and LB pain complaints.  The following significant findings have been identified: Pain, Decreased ROM/flexibility, Decreased joint mobility, Impaired muscle performance, Decreased activity tolerance, and Impaired posture. These impairments interfere with their ability to perform self care tasks, recreational activities, household chores, and household mobility as compared to previous level of function.     Clinical Decision Making (Complexity):  Clinical Presentation: Stable/Uncomplicated  Clinical Presentation Rationale: based on medical and personal factors listed in PT  evaluation  Clinical Decision Making (Complexity): Low complexity    PLAN OF CARE  Treatment Interventions:  Interventions: Manual Therapy, Neuromuscular Re-education, Therapeutic Activity, Therapeutic Exercise    Long Term Goals     PT Goal 1  Goal Description: sit to stand with 0-2/10 PL  Rationale: to maximize safety and independence with performance of ADLs and functional tasks;to maximize safety and independence within the home;to maximize safety and independence within the community  Goal Progress: 4/10PL with sit to stand  Target Date: 06/03/25  PT Goal 2  Goal Description: pt able to ambulate for 30 min with minimal increase in pain; 0-2/10PL  Rationale: to maximize safety and independence within the home;to maximize safety and independence within the community  Goal Progress: pt able to walk 1/4 mile  Target Date: 06/03/25      Frequency of Treatment: 1x/week  Duration of Treatment: x12 weeks    Recommended Referrals to Other Professionals: Physical Therapy  Education Assessment:   Learner/Method: Patient;Demonstration;Pictures/Video    Risks and benefits of evaluation/treatment have been explained.   Patient/Family/caregiver agrees with Plan of Care.     Evaluation Time:     PT Eval, Low Complexity Minutes (63327): 20       Signing Clinician: Yumiko Reich, PT        [facility]                                                                                   OUTPATIENT PHYSICAL THERAPY      PLAN OF TREATMENT FOR OUTPATIENT REHABILITATION   Patient's Last Name, First Name, Boaz Chamberlain YOB: 1950   Provider's Name   [facility]   Medical Record No.  [id number]     Onset Date: 02/27/25 (MD order date)  Start of Care Date: 03/11/25     Medical Diagnosis:  Chronic bilateral low back pain without sciatica  Chronic neck pain  Cervical compression fracture, sequela      PT Treatment Diagnosis:  chronic LB and Neck pain Plan of  Treatment  Frequency/Duration: 1x/week/ x12 weeks    Certification date from 03/11/25 to 06/03/25         See note for plan of treatment details and functional goals     Yumiko Reich, PT                         I CERTIFY THE NEED FOR THESE SERVICES FURNISHED UNDER        THIS PLAN OF TREATMENT AND WHILE UNDER MY CARE     (Physician attestation of this document indicates review and certification of the therapy plan).              Referring Provider:  Gianluca Powell    Initial Assessment  See Epic Evaluation- Start of Care Date: 03/11/25

## 2025-04-01 ENCOUNTER — OFFICE VISIT (OUTPATIENT)
Dept: PHYSICAL MEDICINE AND REHAB | Facility: CLINIC | Age: 75
End: 2025-04-01
Payer: COMMERCIAL

## 2025-04-01 VITALS — HEART RATE: 69 BPM | SYSTOLIC BLOOD PRESSURE: 167 MMHG | OXYGEN SATURATION: 94 % | DIASTOLIC BLOOD PRESSURE: 77 MMHG

## 2025-04-01 DIAGNOSIS — M47.816 SPONDYLOSIS OF LUMBAR REGION WITHOUT MYELOPATHY OR RADICULOPATHY: Primary | ICD-10-CM

## 2025-04-01 DIAGNOSIS — M47.812 CERVICAL SPONDYLOSIS WITHOUT MYELOPATHY: ICD-10-CM

## 2025-04-01 PROCEDURE — 3078F DIAST BP <80 MM HG: CPT | Performed by: PAIN MEDICINE

## 2025-04-01 PROCEDURE — 1125F AMNT PAIN NOTED PAIN PRSNT: CPT | Performed by: PAIN MEDICINE

## 2025-04-01 PROCEDURE — 3077F SYST BP >= 140 MM HG: CPT | Performed by: PAIN MEDICINE

## 2025-04-01 PROCEDURE — 99214 OFFICE O/P EST MOD 30 MIN: CPT | Performed by: PAIN MEDICINE

## 2025-04-01 ASSESSMENT — PAIN SCALES - GENERAL: PAINLEVEL_OUTOF10: MILD PAIN (2)

## 2025-04-01 NOTE — LETTER
4/1/2025      Boaz Acosta  [address] [facility] [address]      Dear Colleague,    Thank you for referring your patient, Boaz Acosta, to the [facility]. Please see a copy of my visit note below.      Assessment:     Diagnoses and all orders for this visit:  Spondylosis of lumbar region without myelopathy or radiculopathy  Cervical spondylosis without myelopathy     Boaz Acosta is a 74 year old y.o. male with past medical history significant for obstructive sleep apnea, obesity, hypertension, transient cerebral ischemic attacks, secondary malignant neoplasm of retroperitoneum and peritoneum, anaphylactic reaction, prostate cancer, urinary problems, central vision loss who presents today for follow-up regarding neck and low back pain:    -Patient has had good improvement with time and physical therapy.  His neck pain is likely secondary to cervical spondylosis myelopathy and low back pain likely secondary to lumbar spondylosis without myelopathy.     Plan:     A shared decision making plan was used. The patient's values and choices were respected. Prior medical records from our last visit on 3/3/2025 and physical therapy notes.  Were reviewed today. The following represents what was discussed and decided upon by the provider and the patient.        -DIAGNOSTIC TESTS: Images were personally reviewed and interpreted.   --Nuclear medicine bone scan on 11/28/2023 report is reviewed and shows degenerative changes in several areas of the spine and back. There is bridging osteophytes in the right lateral aspects of the thoracic and lumbar spine.   -- X-ray of lumbar spine dated 3/3/2025 is personally viewed images interpreted discussed with the patient.  There is mild height loss at T12 vertebral body which is likely chronic.  There is moderate to severe degenerative disc disease at L5-S1 with facet arthropathy which is moderate to severe in the lower lumbar spine.  Multilevel  grade 1 anterolisthesis.  -- X-ray of cervical spine dated 3/3/2025 is personally reviewed images interpreted discussed with the patient.  There is multilevel mild to moderate degenerative disc disease and facet arthropathy.    -INTERVENTIONS: No interventions at this time.    -MEDICATIONS: Recommend they continue with meloxicam 7.5 to 15 mg daily as needed.  -  Discussed side effects of medications and proper use. Patient verbalized understanding.    -PHYSICAL THERAPY: Recommended continue with physical therapy.    -PATIENT EDUCATION: We discussed pain management in a multiple to fashion including physical therapy, medication management, possible future injections.    -FOLLOW UP: Patient will follow-up as needed.  Advised to contact clinic if symptoms worsen or change.    Subjective:     Boaz Acosta is a 74 year old male who presents today for follow-up regarding neck and low back pain.  Patient notes that he is doing quite a bit better from when I saw him last.  His neck pain continues to be somewhat bothersome, however manageable.  Low back pain is improved greatly.  He has been wearing an SI belt which is very helpful.  He also has been using a TENS unit on his neck and is ordered a Thera cane that he is hopeful will be helpful as well.  Pain today is 2/10 its worst is 3/10 as best as 1/10.  Pain is worse when he turns his head.  Physical therapy has been very helpful and he would like to continue with this.  He denies any bowel or bladder changes, fevers, chills, unintentional weight loss.  He started taking meloxicam which was helpful.  He reduced them out to 7.5 mg daily for a time and now is not taking any.    -Treatment to Date: Physical therapy in the past.  CT of abdomen and pelvis.  X-ray of cervical spine and lumbar spine on 3/3/2025.  Current physical therapy.    Patient Active Problem List   Diagnosis     History of colonic polyps     Other urinary problems     CARDIOVASCULAR SCREENING; LDL GOAL  LESS THAN 130     Hypertension goal BP (blood pressure) < 140/90     Prostate cancer (H)     Obesity     ADALID (obstructive sleep apnea)- severe (AHI 58)     Anaphylactic reaction     Obesity (BMI 35.0-39.9) with comorbidity (H)     Idiopathic chronic 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     Transient neurological symptoms     Other transient cerebral ischemic attacks and related syndromes      Vision loss, central, unspecified laterality     Chronic midline low back pain without sciatica     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)     Neck pain, chronic       Current Outpatient Medications   Medication Sig Dispense Refill     allopurinol (ZYLOPRIM) 100 MG tablet Take 2 tablets (200 mg) by mouth daily. 180 tablet 1     ascorbic acid (VITAMIN C) 1000 MG TABS Take 1-2 tablets by mouth as needed        bicalutamide (CASODEX) 50 MG tablet Take 1 tablet (50 mg) by mouth daily 14 tablet 0     indomethacin (INDOCIN) 50 MG capsule Take 1 capsule (50 mg) by mouth 2 times daily (with meals). Take as needed for Gout. 60 capsule 11     Leuprolide Acetate, 4 Month, (LUPRON DEPOT, 4-MONTH, IM) Inject 45 mg into the muscle       lisinopril (ZESTRIL) 30 MG tablet TAKE 1 TABLET BY MOUTH EVERY DAY 90 tablet 3     meloxicam (MOBIC) 15 MG tablet Take 1 tablet (15 mg) by mouth daily. 30 tablet 1     ORDER FOR DME, SET TO FAX, PATIENT WAS SET UP ON A [employer]PIR[employer] 560 AUTO MACHINE AT [facility] 9CMH2O WITH HEATED HUMIDITY. PATIENT HAD A CHOICE OF MASK AND CHOSE THE AIRFIT P10 SIZE MEDIUM NASAL PILLOW. HE HAS A F/U APPOINTMENT TO HIS SLEEP STUDY 2/5 WITH TURNER GLASER PA-C AND A SECOND ONE SCHEDULED IN 6 WEEKS.       predniSONE (DELTASONE) 10 MG tablet 5 tabs PO QD x 2 days then 4 tabs PO QD x 2 days then 3 tabs PO QD x 2 days then 2 tabs PO QD x 2 days then 1 tab PO QD x 2 days (Patient not taking: Reported on 3/5/2025) 30 tablet 5     UNABLE TO FIND MEDICATION NAME: mushroom supplement that helps to limit tumor growth        UNABLE TO FIND MEDICATION NAME: Turkey tail supplement       No current facility-administered medications for this visit.       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 Otherwise 13 systems reviewed are negative. Please see the patient's intake questionnaire from today for details.    Reviewed Social, Family, Past Medical and Past Surgical history with patient, no significant changes noted since prior visit.     Objective:     BP (!) 167/77   Pulse 69   SpO2 94%     PHYSICAL EXAMINATION:    --CONSTITUTIONAL: Well developed, well nourished, healthy appearing individual.  --PSYCHIATRIC: Appropriate mood and affect. No difficulty interacting due to temper, social withdrawal, or memory issues.  --RESPIRATORY: Normal rhythm and effort. No abnormal accessory muscle breathing patterns noted.   --MUSCULOSKELETAL:  Normal lumbar lordosis noted, no lateral shift.  --GROSS MOTOR: Easily arises from a seated position. Gait is non-antalgic  --LUMBAR SPINE:  Inspection reveals no evidence of deformity. Range of motion is mildly limited in lumbar flexion, extension, lateral rotation. No tenderness to palpation lumbar spine.  --SACROILIAC JOINT: One Finger point test negative.  --LOWER EXTREMITY MOTOR TESTING:  Plantar flexion left 5/5, right 5/5    Dorsiflexion left 5/5, right 5/5   Great toe MTP extension left 5/5, right 5/5  Knee flexion left 5/5, right 5/5  Knee extension left 5/5, right 5/5   Hip flexion left 5/5, right 5/5  Hip abduction left 5/5, right 5/5  Hip adduction left 5/5, right 5/5   --NEUROLOGIC: Sensation to light touch is intact in the bilateral L4, L5, and S1 dermatomes.    RESULTS:   Imaging: Lumbar spine imaging was reviewed today.     XR Lumbar Spine 2/3 Views    Result Date: 3/3/2025  EXAM: XR LUMBAR SPINE 2/3 VIEWS LOCATION: [facility] DATE: 03/03/2025 INDICATION: Low back pain. COMPARISON: Abdomen and pelvis CT 11/24/2023.     IMPRESSION: Mild height loss involving the T12 vertebral body, probably chronic, similar compared to the prior. Otherwise, no fracture identified. Moderate to severe degenerative disc disease at L5-S1. Background of moderate degenerative disc disease. Moderate severe lower facet arthropathy. Multilevel grade 1 spondylolisthesis. Levoconvex curvature.    XR Cervical Spine 2/3 Views    Result Date: 3/3/2025  EXAM: XR CERVICAL SPINE 2/3 VIEWS LOCATION: [facility] DATE: 3/3/2025 INDICATION: Chronic neck pain. Cervical compression fracture, sequela. COMPARISON: None.     IMPRESSION: Multilevel mild-to-moderate degenerative disc disease and facet arthropathy. Multilevel subtle grade 1 spondylolisthesis.                            Again, thank you for allowing me to participate in the care of your patient.        Sincerely,        Cory Calero DO    Electronically signed

## 2025-04-01 NOTE — PATIENT INSTRUCTIONS
~Please call Nurse Navigation line [phone] with any questions or concerns about your treatment plan, if symptoms worsen and you would like to be seen urgently, or if you have problems controlling bladder and bowel function.

## 2025-04-01 NOTE — PROGRESS NOTES
Assessment:     Diagnoses and all orders for this visit:  Spondylosis of lumbar region without myelopathy or radiculopathy  Cervical spondylosis without myelopathy     Boaz Acosta is a 74 year old y.o. male with past medical history significant for obstructive sleep apnea, obesity, hypertension, transient cerebral ischemic attacks, secondary malignant neoplasm of retroperitoneum and peritoneum, anaphylactic reaction, prostate cancer, urinary problems, central vision loss who presents today for follow-up regarding neck and low back pain:    -Patient has had good improvement with time and physical therapy.  His neck pain is likely secondary to cervical spondylosis myelopathy and low back pain likely secondary to lumbar spondylosis without myelopathy.     Plan:     A shared decision making plan was used. The patient's values and choices were respected. Prior medical records from our last visit on 3/3/2025 and physical therapy notes.  Were reviewed today. The following represents what was discussed and decided upon by the provider and the patient.        -DIAGNOSTIC TESTS: Images were personally reviewed and interpreted.   --Nuclear medicine bone scan on 11/28/2023 report is reviewed and shows degenerative changes in several areas of the spine and back. There is bridging osteophytes in the right lateral aspects of the thoracic and lumbar spine.   -- X-ray of lumbar spine dated 3/3/2025 is personally viewed images interpreted discussed with the patient.  There is mild height loss at T12 vertebral body which is likely chronic.  There is moderate to severe degenerative disc disease at L5-S1 with facet arthropathy which is moderate to severe in the lower lumbar spine.  Multilevel grade 1 anterolisthesis.  -- X-ray of cervical spine dated 3/3/2025 is personally reviewed images interpreted discussed with the patient.  There is multilevel mild to moderate degenerative disc disease and facet arthropathy.    -INTERVENTIONS:  No interventions at this time.    -MEDICATIONS: Recommend they continue with meloxicam 7.5 to 15 mg daily as needed.  -  Discussed side effects of medications and proper use. Patient verbalized understanding.    -PHYSICAL THERAPY: Recommended continue with physical therapy.    -PATIENT EDUCATION: We discussed pain management in a multiple to fashion including physical therapy, medication management, possible future injections.    -FOLLOW UP: Patient will follow-up as needed.  Advised to contact clinic if symptoms worsen or change.    Subjective:     Boaz Acosta is a 74 year old male who presents today for follow-up regarding neck and low back pain.  Patient notes that he is doing quite a bit better from when I saw him last.  His neck pain continues to be somewhat bothersome, however manageable.  Low back pain is improved greatly.  He has been wearing an SI belt which is very helpful.  He also has been using a TENS unit on his neck and is ordered a Thera cane that he is hopeful will be helpful as well.  Pain today is 2/10 its worst is 3/10 as best as 1/10.  Pain is worse when he turns his head.  Physical therapy has been very helpful and he would like to continue with this.  He denies any bowel or bladder changes, fevers, chills, unintentional weight loss.  He started taking meloxicam which was helpful.  He reduced them out to 7.5 mg daily for a time and now is not taking any.    -Treatment to Date: Physical therapy in the past.  CT of abdomen and pelvis.  X-ray of cervical spine and lumbar spine on 3/3/2025.  Current physical therapy.    Patient Active Problem List   Diagnosis    History of colonic polyps    Other urinary problems    CARDIOVASCULAR SCREENING; LDL GOAL LESS THAN 130    Hypertension goal BP (blood pressure) < 140/90    Prostate cancer (H)    Obesity    ADALID (obstructive sleep apnea)- severe (AHI 58)    Anaphylactic reaction    Obesity (BMI 35.0-39.9) with comorbidity (H)    Idiopathic chronic gout of  right foot without tophus    Transient neurological symptoms    Other transient cerebral ischemic attacks and related syndromes     Vision loss, central, unspecified laterality    Chronic midline low back pain without sciatica    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)    Neck pain, chronic       Current Outpatient Medications   Medication Sig Dispense Refill    allopurinol (ZYLOPRIM) 100 MG tablet Take 2 tablets (200 mg) by mouth daily. 180 tablet 1    ascorbic acid (VITAMIN C) 1000 MG TABS Take 1-2 tablets by mouth as needed       bicalutamide (CASODEX) 50 MG tablet Take 1 tablet (50 mg) by mouth daily 14 tablet 0    indomethacin (INDOCIN) 50 MG capsule Take 1 capsule (50 mg) by mouth 2 times daily (with meals). Take as needed for Gout. 60 capsule 11    Leuprolide Acetate, 4 Month, (LUPRON DEPOT, 4-MONTH, IM) Inject 45 mg into the muscle      lisinopril (ZESTRIL) 30 MG tablet TAKE 1 TABLET BY MOUTH EVERY DAY 90 tablet 3    meloxicam (MOBIC) 15 MG tablet Take 1 tablet (15 mg) by mouth daily. 30 tablet 1    ORDER FOR DME, SET TO FAX, PATIENT WAS SET UP ON A [employer] 560 AUTO MACHINE AT [facility] 9CMH2O WITH HEATED HUMIDITY. PATIENT HAD A CHOICE OF MASK AND CHOSE THE AIRFIT P10 SIZE MEDIUM NASAL PILLOW. HE HAS A F/U APPOINTMENT TO HIS SLEEP STUDY 2/5 WITH TURNER GLASER PA-C AND A SECOND ONE SCHEDULED IN 6 WEEKS.      predniSONE (DELTASONE) 10 MG tablet 5 tabs PO QD x 2 days then 4 tabs PO QD x 2 days then 3 tabs PO QD x 2 days then 2 tabs PO QD x 2 days then 1 tab PO QD x 2 days (Patient not taking: Reported on 3/5/2025) 30 tablet 5    UNABLE TO FIND MEDICATION NAME: mushroom supplement that helps to limit tumor growth      UNABLE TO FIND MEDICATION NAME: Turkey tail supplement       No current facility-administered medications for this visit.       Allergies   Allergen Reactions    Chlorthalidone      Only issue when he is using Lupron- vision disturbances         Past Medical History:    Diagnosis Date    Elevated prostate specific antigen (PSA) 5/30/2014    History of blood transfusion     Hypertension several years    mainly with mechanical testing    Idiopathic chronic 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 9/25/2019    Lyme disease     2 times    Prostate cancer (H) 10/24/2014    Prostate resection    SCC (squamous cell carcinoma), face 12/12/2012        Review of Systems  ROS:  Specifically negative for bowel/bladder dysfunction, balance changes, headache, dizziness, foot drop, fevers, chills, appetite changes, nausea/vomiting, unexplained weight loss. Otherwise 13 systems reviewed are negative. Please see the patient's intake questionnaire from today for details.    Reviewed Social, Family, Past Medical and Past Surgical history with patient, no significant changes noted since prior visit.     Objective:     BP (!) 167/77   Pulse 69   SpO2 94%     PHYSICAL EXAMINATION:    --CONSTITUTIONAL: Well developed, well nourished, healthy appearing individual.  --PSYCHIATRIC: Appropriate mood and affect. No difficulty interacting due to temper, social withdrawal, or memory issues.  --RESPIRATORY: Normal rhythm and effort. No abnormal accessory muscle breathing patterns noted.   --MUSCULOSKELETAL:  Normal lumbar lordosis noted, no lateral shift.  --GROSS MOTOR: Easily arises from a seated position. Gait is non-antalgic  --LUMBAR SPINE:  Inspection reveals no evidence of deformity. Range of motion is mildly limited in lumbar flexion, extension, lateral rotation. No tenderness to palpation lumbar spine.  --SACROILIAC JOINT: One Finger point test negative.  --LOWER EXTREMITY MOTOR TESTING:  Plantar flexion left 5/5, right 5/5   Dorsiflexion left 5/5, right 5/5   Great toe MTP extension left 5/5, right 5/5  Knee flexion left 5/5, right 5/5  Knee extension left 5/5, right 5/5   Hip flexion left 5/5, right 5/5  Hip abduction left 5/5, right 5/5  Hip adduction left 5/5, right 5/5   --NEUROLOGIC: Sensation to  light touch is intact in the bilateral L4, L5, and S1 dermatomes.    RESULTS:   Imaging: Lumbar spine imaging was reviewed today.     XR Lumbar Spine 2/3 Views    Result Date: 3/3/2025  EXAM: XR LUMBAR SPINE 2/3 VIEWS LOCATION: [facility] DATE: 03/03/2025 INDICATION: Low back pain. COMPARISON: Abdomen and pelvis CT 11/24/2023.     IMPRESSION: Mild height loss involving the T12 vertebral body, probably chronic, similar compared to the prior. Otherwise, no fracture identified. Moderate to severe degenerative disc disease at L5-S1. Background of moderate degenerative disc disease. Moderate severe lower facet arthropathy. Multilevel grade 1 spondylolisthesis. Levoconvex curvature.    XR Cervical Spine 2/3 Views    Result Date: 3/3/2025  EXAM: XR CERVICAL SPINE 2/3 VIEWS LOCATION: [facility] DATE: 3/3/2025 INDICATION: Chronic neck pain. Cervical compression fracture, sequela. COMPARISON: None.     IMPRESSION: Multilevel mild-to-moderate degenerative disc disease and facet arthropathy. Multilevel subtle grade 1 spondylolisthesis.

## 2025-06-14 ENCOUNTER — HEALTH MAINTENANCE LETTER (OUTPATIENT)
Age: 75
End: 2025-06-14

## 2025-06-30 ENCOUNTER — PATIENT OUTREACH (OUTPATIENT)
Dept: CARE COORDINATION | Facility: CLINIC | Age: 75
End: 2025-06-30

## 2025-06-30 ENCOUNTER — LAB (OUTPATIENT)
Dept: INFUSION THERAPY | Facility: CLINIC | Age: 75
End: 2025-06-30
Attending: INTERNAL MEDICINE
Payer: COMMERCIAL

## 2025-06-30 DIAGNOSIS — C61 PROSTATE CANCER (H): Primary | ICD-10-CM

## 2025-06-30 LAB
ALBUMIN SERPL BCG-MCNC: 4.3 G/DL (ref 3.5–5.2)
ALP SERPL-CCNC: 55 U/L (ref 40–150)
ALT SERPL W P-5'-P-CCNC: 24 U/L (ref 0–70)
ANION GAP SERPL CALCULATED.3IONS-SCNC: 12 MMOL/L (ref 7–15)
AST SERPL W P-5'-P-CCNC: 32 U/L (ref 0–45)
BASOPHILS # BLD AUTO: 0.1 10E3/UL (ref 0–0.2)
BASOPHILS NFR BLD AUTO: 1 %
BILIRUB SERPL-MCNC: 0.7 MG/DL
BUN SERPL-MCNC: 20.9 MG/DL (ref 8–23)
CALCIUM SERPL-MCNC: 9.6 MG/DL (ref 8.8–10.4)
CHLORIDE SERPL-SCNC: 105 MMOL/L (ref 98–107)
CREAT SERPL-MCNC: 1.22 MG/DL (ref 0.67–1.17)
EGFRCR SERPLBLD CKD-EPI 2021: 63 ML/MIN/1.73M2
EOSINOPHIL # BLD AUTO: 0.2 10E3/UL (ref 0–0.7)
EOSINOPHIL NFR BLD AUTO: 2 %
ERYTHROCYTE [DISTWIDTH] IN BLOOD BY AUTOMATED COUNT: 13.2 % (ref 10–15)
GLUCOSE SERPL-MCNC: 125 MG/DL (ref 70–99)
HCO3 SERPL-SCNC: 24 MMOL/L (ref 22–29)
HCT VFR BLD AUTO: 35.9 % (ref 40–53)
HGB BLD-MCNC: 12.1 G/DL (ref 13.3–17.7)
HOLD SPECIMEN: NORMAL
IMM GRANULOCYTES # BLD: 0 10E3/UL
IMM GRANULOCYTES NFR BLD: 0 %
LYMPHOCYTES # BLD AUTO: 2.3 10E3/UL (ref 0.8–5.3)
LYMPHOCYTES NFR BLD AUTO: 35 %
MCH RBC QN AUTO: 31.9 PG (ref 26.5–33)
MCHC RBC AUTO-ENTMCNC: 33.7 G/DL (ref 31.5–36.5)
MCV RBC AUTO: 95 FL (ref 78–100)
MONOCYTES # BLD AUTO: 0.7 10E3/UL (ref 0–1.3)
MONOCYTES NFR BLD AUTO: 10 %
NEUTROPHILS # BLD AUTO: 3.4 10E3/UL (ref 1.6–8.3)
NEUTROPHILS NFR BLD AUTO: 51 %
NRBC # BLD AUTO: 0 10E3/UL
NRBC BLD AUTO-RTO: 0 /100
PLATELET # BLD AUTO: 244 10E3/UL (ref 150–450)
POTASSIUM SERPL-SCNC: 4.6 MMOL/L (ref 3.4–5.3)
PROT SERPL-MCNC: 7.2 G/DL (ref 6.4–8.3)
PSA SERPL DL<=0.01 NG/ML-MCNC: 0.17 NG/ML (ref 0–6.5)
RBC # BLD AUTO: 3.79 10E6/UL (ref 4.4–5.9)
SODIUM SERPL-SCNC: 141 MMOL/L (ref 135–145)
WBC # BLD AUTO: 6.7 10E3/UL (ref 4–11)

## 2025-06-30 PROCEDURE — 84403 ASSAY OF TOTAL TESTOSTERONE: CPT

## 2025-06-30 PROCEDURE — 84153 ASSAY OF PSA TOTAL: CPT

## 2025-06-30 PROCEDURE — 85004 AUTOMATED DIFF WBC COUNT: CPT

## 2025-06-30 PROCEDURE — 82040 ASSAY OF SERUM ALBUMIN: CPT

## 2025-06-30 PROCEDURE — 36415 COLL VENOUS BLD VENIPUNCTURE: CPT

## 2025-07-02 LAB — TESTOST SERPL-MCNC: 4 NG/DL (ref 240–950)

## 2025-07-03 ENCOUNTER — ONCOLOGY VISIT (OUTPATIENT)
Dept: ONCOLOGY | Facility: CLINIC | Age: 75
End: 2025-07-03
Attending: INTERNAL MEDICINE
Payer: COMMERCIAL

## 2025-07-03 ENCOUNTER — INFUSION THERAPY VISIT (OUTPATIENT)
Dept: INFUSION THERAPY | Facility: CLINIC | Age: 75
End: 2025-07-03
Attending: INTERNAL MEDICINE
Payer: COMMERCIAL

## 2025-07-03 VITALS
HEART RATE: 69 BPM | SYSTOLIC BLOOD PRESSURE: 145 MMHG | TEMPERATURE: 98.8 F | DIASTOLIC BLOOD PRESSURE: 81 MMHG | BODY MASS INDEX: 37.26 KG/M2 | WEIGHT: 245 LBS

## 2025-07-03 VITALS
SYSTOLIC BLOOD PRESSURE: 145 MMHG | WEIGHT: 245.2 LBS | BODY MASS INDEX: 37.29 KG/M2 | DIASTOLIC BLOOD PRESSURE: 81 MMHG | OXYGEN SATURATION: 97 % | TEMPERATURE: 98.8 F | HEART RATE: 69 BPM

## 2025-07-03 DIAGNOSIS — I10 UNCONTROLLED HYPERTENSION: ICD-10-CM

## 2025-07-03 DIAGNOSIS — R79.89 ELEVATED LFTS: ICD-10-CM

## 2025-07-03 DIAGNOSIS — E66.01 MORBID OBESITY (H): ICD-10-CM

## 2025-07-03 DIAGNOSIS — C61 PROSTATE CANCER (H): Primary | ICD-10-CM

## 2025-07-03 PROCEDURE — G0463 HOSPITAL OUTPT CLINIC VISIT: HCPCS | Performed by: INTERNAL MEDICINE

## 2025-07-03 RX ORDER — LISINOPRIL 40 MG/1
40 TABLET ORAL DAILY
Qty: 90 TABLET | Refills: 3 | Status: SHIPPED | OUTPATIENT
Start: 2025-07-03

## 2025-07-03 ASSESSMENT — PAIN SCALES - GENERAL
PAINLEVEL_OUTOF10: NO PAIN (0)
PAINLEVEL_OUTOF10: NO PAIN (0)

## 2025-07-03 NOTE — NURSING NOTE
"Oncology Rooming Note    July 3, 2025 8:27 AM   Boaz Acosta is a 74 year old male who presents for:    Chief Complaint   Patient presents with    Oncology Clinic Visit     Initial Vitals: BP (!) 145/81 (BP Location: Left arm)   Pulse 69   Wt 111.2 kg (245 lb 3.2 oz)   SpO2 97%   BMI 37.29 kg/m   Estimated body mass index is 37.29 kg/m  as calculated from the following:    Height as of 2/27/25: 1.727 m (5' 7.99\").    Weight as of this encounter: 111.2 kg (245 lb 3.2 oz). Body surface area is 2.31 meters squared.  No Pain (0) Comment: Data Unavailable   No LMP for male patient.  Allergies reviewed: Yes  Medications reviewed: Yes    Medications: Medication refills not needed today.  Pharmacy name entered into [employer]: [facility] [id number] [place] [address]    Frailty Screening:   Is the patient here for a new oncology consult visit in cancer care? 2. No    PHQ9:  Did this patient require a PHQ9?: No      Clinical concerns: No Concerns        Tommie Pal MA            "

## 2025-07-03 NOTE — PROGRESS NOTES
[facility] CANCER CLINIC  FOLLOW-UP VISIT NOTE    PATIENT NAME: Boaz Acosta MRN # [id number]  DATE OF VISIT: Jul 3, 2025 YOB: 1950    REFERRING PROVIDER: Rocío Boss MD  [address] [address]    CANCER TYPE: Prostate adenocarcinoma  STAGE: IV  ECOG PS: 0    TREATMENT SUMMARY:  Boaz's annual screening PSA was noted to be elevated at 17 in 2012, and 21 in the following year and it was attributed to prostatitis. Eventually, he did change his primary care physician and his repeat PSA was noted to be elevated at 31 in 04/2014. He was then referred to Urology, and was seen by Dr. Pressley with Urology in 07/2014. He was worked up with a biopsy of the prostate on 08/01//2014, which revealed adenocarcinoma of the prostate with Willis score of 4+3 involving 12 of the 12 cores. He was staged with a PET/CT scan, which did not show any evidence of distant metastasis. There was evidence of increased FDG uptake throughout the prostate with a maximum SUV of 7.1. He had a bone scan done on 08/19/2014, which did not show any obvious evidence of metastasis. Mr. Acosta underwent a robotic-assisted radical prostatectomy and lymph node sampling performed by Dr. Hesham Prieto on 10/24/2014. The pathology from this revealed adenocarcinoma of the prostate with a Willis of 7 and a tertiary pattern of 5. There was extensive prostatic tissue involvement with over 75% of the tissue with evidence of disease. There was extensive extraprostatic extension involving the apex, right and left anterior and posterior base, and soft tissue around both seminal vesicles. There was bilateral seminal vesicle invasion with involvement of vas deferens. Margins were involved with bladder neck and bilateral posterior seminal vesicle soft tissue apex. There was evidence of perineural invasion. The lymph node sample was negative for disease. His postoperative PSA  in 12/2014 remained elevated at 7.1, and all the subsequent PSAs have remained positive. His restaging scans done in January and again in 03/2015, including a CT abdomen and pelvis and bone scans, have been negative. He was referred to Radiation Oncology for consideration of salvage radiation therapy, and was seen by Dr. Murphy on 03/24/2015. Dr. Murphy referred him to Dr. Zbigniew Chu at the [facility] to have a choline-11 PET/CT scan done. The choline-11 scan done at [place] demonstrated evidence of 2 retroperitoneal lymph nodes that are mildly positive for uptake. There was no abnormal uptake in the prostate bed to suggest local recurrence. He was started on bicalutamide, and received his first dose of Lupron on 05/12/2015.   He comes for a scheduled follow up visit     5/12/2015 9/2/2015 1/4/2016 6/20/2019 7/30/2020   leuprolide  IM 30 mg 30 mg 30 mg 30 mg 30 mg      7/15/2021  9:43 AM 1/5/2023  8:57 AM   leuprolide (LUPRON DEPOT) IM 45 mg  45 mg      CURRENT TREATMENT  Off period of intermittent androgen deprivation therapy    SUBJECTIVE   Boaz comes for a scheduled follow up visit on androgen ablation therapy.     Boaz was seen in person.  He has been in good health since last visit other than back pain.      - Experienced serious spinal problems over the last six months, with excruciating pain.  - Underwent physical therapy and daily exercises, resulting in improvement.  - History of a hang gliding accident causing fractures throughout the spine and a shattered right femur.  - Noted increased sedentary lifestyle leading to weight gain.  - PSA levels previously increased significantly after a treatment break.  - Creatinine levels slightly elevated, possibly related to past high-dose ibuprofen use.  - Blood pressure consistently high, monitored at home with readings around 136/82 and 138/80.  - Previously took 2400 mg of ibuprofen daily for two months, switched to meloxicam by a spine clinic physician.  - Off analgesics  for almost two months, continuing physical therapy.      He is aware of his labs including his PSA.      He has been doing well and denies any other complains.      Wt Readings from Last 10 Encounters:   07/03/25 111.2 kg (245 lb 3.2 oz)   02/27/25 112.9 kg (249 lb)   12/19/24 106.9 kg (235 lb 10.8 oz)   12/19/24 106.9 kg (235 lb 9.6 oz)   09/10/24 107 kg (236 lb)   06/20/24 110.7 kg (244 lb 0.8 oz)   06/20/24 110.7 kg (244 lb)   05/04/24 110.5 kg (243 lb 9 oz)   12/14/23 115.1 kg (253 lb 12.8 oz)   11/30/23 111.6 kg (246 lb)       PAST MEDICAL HISTORY   Prostate cancer with disease metastatic to para-aortic nodes   Borderline HTN  ADALID an CPAP at night  GERD      CURRENT OUTPATIENT MEDICATIONS     Current Outpatient Medications   Medication Sig    ascorbic acid (VITAMIN C) 1000 MG TABS Take 1-2 tablets by mouth as needed     indomethacin (INDOCIN) 50 MG capsule Take 1 capsule (50 mg) by mouth 2 times daily (with meals) Take as needed for Gout.    Leuprolide Acetate, 4 Month, (LUPRON DEPOT, 4-MONTH, IM) Inject 45 mg into the muscle    lisinopril (ZESTRIL) 30 MG tablet     UNABLE TO FIND MEDICATION NAME: mushroom supplement that helps to limit tumor growth    UNABLE TO FIND MEDICATION NAME: Turkey tail supplement     No current facility-administered medications for this visit.         ALLERGIES     Allergies   Allergen Reactions    Chlorthalidone      Only issue when he is using Lupron- vision disturbances          REVIEW OF SYSTEMS   As above in the HPI, o/w complete 12-point ROS was negative.     PHYSICAL EXAM   BP (!) 145/81 (BP Location: Left arm)   Pulse 69   Temp 98.8  F (37.1  C) (Oral)   Wt 111.2 kg (245 lb 3.2 oz)   SpO2 97%   BMI 37.29 kg/m      SpO2 Readings from Last 4 Encounters:   06/18/18 96%   10/30/17 97%   10/16/17 97%   06/19/17 96%     Wt Readings from Last 3 Encounters:   07/03/25 111.2 kg (245 lb 3.2 oz)   02/27/25 112.9 kg (249 lb)   12/19/24 106.9 kg (235 lb 10.8 oz)     GEN: NAD  HEENT:  "PERRL, EOMI, no icterus, injection or pallor. Oropharynx is clear.  NECK: no cervical or supraclavicular lymphadenopathy  LUNGS: clear bilaterally  CV: regular, no murmurs, rubs, or gallops  ABDOMEN: soft, non-tender, non-distended, normal bowel sounds, no hepatosplenomegaly by percussion or palpation  EXT: warm, well perfused, no edema  NEURO: alert  SKIN: lesion in subxiphoid (epigastrium) as below     LABORATORY AND IMAGING STUDIES     Recent Labs   Lab Test 06/30/25  0908 12/16/24  0943 06/17/24  0854 11/24/23  1320 11/24/23  1301 07/03/23  0857    139 137  --  138 141   POTASSIUM 4.6 4.4 4.9  --  5.2 4.4   CHLORIDE 105 104 102  --  104 104   CO2 24 25 27  --  24 26   ANIONGAP 12 10 8  --  10 11   BUN 20.9 20.0 9.5  --  17.9 18.0   CR 1.22* 1.02 1.09 1.2 1.04 1.07   * 124* 117*  --  113* 135*   KERRY 9.6 9.6 9.4  --  9.4 8.6*     No results for input(s): \"MAG\", \"PHOS\" in the last 41890 hours.  Recent Labs   Lab Test 06/30/25  0908 12/16/24  0943 06/17/24  0854 11/24/23  1301 07/03/23  0857   WBC 6.7 6.7 7.1 7.1 6.9   HGB 12.1* 12.1* 13.7 13.0* 12.3*    222 269 279 228   MCV 95 96 97 98 94   NEUTROPHIL 51 47 50 54 57     Recent Labs   Lab Test 06/30/25  0908 12/16/24  0943 06/17/24  0854 11/24/23  1301 07/03/23  0857 01/02/23  0845 01/02/23  0845   BILITOTAL 0.7 0.6 0.8 0.4 0.8  --  0.5   ALKPHOS 55 59 72 55 67  --  57   ALT 24 20 17 24 21  --  30   AST 32 28 23 31 31  --  24   ALBUMIN 4.3 4.2 4.2 4.4 4.3   < > 3.5   LDH  --   --   --  212 203  --  154    < > = values in this interval not displayed.     TSH   Date Value Ref Range Status   03/14/2005 2.22 0.4 - 5.0 mU/L Final     No results for input(s): \"CEA\" in the last 11065 hours.  Results for orders placed or performed during the hospital encounter of 03/03/25   XR Cervical Spine 2/3 Views    Narrative    EXAM: XR CERVICAL SPINE 2/3 VIEWS  LOCATION: [facility]  DATE: 3/3/2025    INDICATION: Chronic neck pain. " Cervical compression fracture, sequela.  COMPARISON: None.      Impression    IMPRESSION: Multilevel mild-to-moderate degenerative disc disease and facet arthropathy. Multilevel subtle grade 1 spondylolisthesis.   XR Lumbar Spine 2/3 Views    Narrative    EXAM: XR LUMBAR SPINE 2/3 VIEWS  LOCATION: [facility]  DATE: 03/03/2025    INDICATION: Low back pain.  COMPARISON: Abdomen and pelvis CT 11/24/2023.      Impression    IMPRESSION: Mild height loss involving the T12 vertebral body, probably chronic, similar compared to the prior. Otherwise, no fracture identified. Moderate to severe degenerative disc disease at L5-S1. Background of moderate degenerative disc disease.   Moderate severe lower facet arthropathy. Multilevel grade 1 spondylolisthesis. Levoconvex curvature.     Recent Labs   Lab Test 06/30/25  0908 12/16/24  0943 06/17/24  0854 11/24/23  1301 07/03/23  0857   PSA 0.17 0.33 16.00* 1.08 0.26   TESTOSTTOTAL 4* 9* 541 611 8*       ASSESSMENT AND PLAN   Prostate cancer with metastasis to retroperitoneal nodes with persistent PSA elevation post prostatectomy; ish 4+3 disease, tertiary score of 5  Elevated LFT's, obesity, elevated blood pressure at this visit  ECOG PS 0  No other medical comorbiditiies    Boaz is alone at this clinic visit.  He has been doing very well since the last visit.  He has no physical complaints at this visit.    I have reviewed all of the labs done prior to this clinic visit.  Labs are all completely normal including electrolytes, renal function, complete blood count and differential. He previously had mild normocytic anemia which has resolved. His hepatic panel which had revealed persistent elevation in his transaminases has resolved at this visit.     He is aware of his PSA values.  His PSA had gone up to 16 ng/ml from 1.08 ng/ml in merely 6 months.  His PSA had doubled almost 4 times. I have switched him to continuous ADT. We will have to continually  supppress his testosterone now as doubling time of 3 months or less is a poor prognostic sign and he has a doubling time of 6 weeks. I will administer leuprolide today.       Prostate cancer:  - Aggressive disease with PSA levels previously undetectable, now increased to 16. Disease is trending in the right direction with current treatment.  - Continue current treatment without breaks. Follow-up in six months.    Uncontrolled hypertension:  - Blood pressure readings high in the clinic, but lower at home. Lisinopril may be contributing to elevated creatinine levels.  - Increase lisinopril dose from 30 mg to 40 mg. Monitor blood pressure regularly. Prescription adjustment to be filled in three months.    I have encouraged him to keep working on his blood pressures and weight.     I will again see him in 6 months with labs a week prior to visit including CBC, CMP, PSA, testosterone.     The longitudinal plan of care for the diagnosis(es)/condition(s) as documented were addressed during this visit. Due to the added complexity in care, I will continue to support Boaz in the subsequent management and with ongoing continuity of care.    30 minutes spent on the date of the encounter doing chart review, history and exam, documentation and further activities as noted above      Pavel Norton    Hematologist and Medical Oncologist  [facility]

## 2025-07-03 NOTE — LETTER
7/3/2025      Boaz Acosta  [address] [id number]      Dear Colleague,    Thank you for referring your patient, Boaz Acosta, to the [facility]. Please see a copy of my visit note below.    [facility] CANCER CLINIC  FOLLOW-UP VISIT NOTE    PATIENT NAME: Boaz Acosta MRN # [id number]  DATE OF VISIT: Jul 3, 2025 YOB: 1950    REFERRING PROVIDER: Rocío Boss MD  [facility]  [address] [place] [address]    CANCER TYPE: Prostate adenocarcinoma  STAGE: IV  ECOG PS: 0    TREATMENT SUMMARY:  Boaz's annual screening PSA was noted to be elevated at 17 in 2012, and 21 in the following year and it was attributed to prostatitis. Eventually, he did change his primary care physician and his repeat PSA was noted to be elevated at 31 in 04/2014. He was then referred to Urology, and was seen by Dr. Pressley with Urology in 07/2014. He was worked up with a biopsy of the prostate on 08/01//2014, which revealed adenocarcinoma of the prostate with Willis score of 4+3 involving 12 of the 12 cores. He was staged with a PET/CT scan, which did not show any evidence of distant metastasis. There was evidence of increased FDG uptake throughout the prostate with a maximum SUV of 7.1. He had a bone scan done on 08/19/2014, which did not show any obvious evidence of metastasis. Mr. Acosta underwent a robotic-assisted radical prostatectomy and lymph node sampling performed by Dr. Hesham Prieto on 10/24/2014. The pathology from this revealed adenocarcinoma of the prostate with a Willis of 7 and a tertiary pattern of 5. There was extensive prostatic tissue involvement with over 75% of the tissue with evidence of disease. There was extensive extraprostatic extension involving the apex, right and left anterior and posterior base, and soft tissue around both seminal vesicles. There was bilateral seminal vesicle invasion  with involvement of vas deferens. Margins were involved with bladder neck and bilateral posterior seminal vesicle soft tissue apex. There was evidence of perineural invasion. The lymph node sample was negative for disease. His postoperative PSA in 12/2014 remained elevated at 7.1, and all the subsequent PSAs have remained positive. His restaging scans done in January and again in 03/2015, including a CT abdomen and pelvis and bone scans, have been negative. He was referred to Radiation Oncology for consideration of salvage radiation therapy, and was seen by Dr. Murphy on 03/24/2015. Dr. Murphy referred him to Dr. Zbigniew Chu at the [facility] to have a choline-11 PET/CT scan done. The choline-11 scan done at [place] demonstrated evidence of 2 retroperitoneal lymph nodes that are mildly positive for uptake. There was no abnormal uptake in the prostate bed to suggest local recurrence. He was started on bicalutamide, and received his first dose of Lupron on 05/12/2015.   He comes for a scheduled follow up visit     5/12/2015 9/2/2015 1/4/2016 6/20/2019 7/30/2020   leuprolide  IM 30 mg 30 mg 30 mg 30 mg 30 mg      7/15/2021  9:43 AM 1/5/2023  8:57 AM   leuprolide (LUPRON DEPOT) IM 45 mg  45 mg      CURRENT TREATMENT  Off period of intermittent androgen deprivation therapy    SUBJECTIVE   Boaz comes for a scheduled follow up visit on androgen ablation therapy.     Boaz was seen in person.  He has been in good health since last visit other than back pain.      - Experienced serious spinal problems over the last six months, with excruciating pain.  - Underwent physical therapy and daily exercises, resulting in improvement.  - History of a hang gliding accident causing fractures throughout the spine and a shattered right femur.  - Noted increased sedentary lifestyle leading to weight gain.  - PSA levels previously increased significantly after a treatment break.  - Creatinine levels slightly elevated, possibly related to past  high-dose ibuprofen use.  - Blood pressure consistently high, monitored at home with readings around 136/82 and 138/80.  - Previously took 2400 mg of ibuprofen daily for two months, switched to meloxicam by a spine clinic physician.  - Off analgesics for almost two months, continuing physical therapy.      He is aware of his labs including his PSA.      He has been doing well and denies any other complains.      Wt Readings from Last 10 Encounters:   07/03/25 111.2 kg (245 lb 3.2 oz)   02/27/25 112.9 kg (249 lb)   12/19/24 106.9 kg (235 lb 10.8 oz)   12/19/24 106.9 kg (235 lb 9.6 oz)   09/10/24 107 kg (236 lb)   06/20/24 110.7 kg (244 lb 0.8 oz)   06/20/24 110.7 kg (244 lb)   05/04/24 110.5 kg (243 lb 9 oz)   12/14/23 115.1 kg (253 lb 12.8 oz)   11/30/23 111.6 kg (246 lb)       PAST MEDICAL HISTORY   Prostate cancer with disease metastatic to para-aortic nodes   Borderline HTN  ADALID an CPAP at night  GERD      CURRENT OUTPATIENT MEDICATIONS     Current Outpatient Medications   Medication Sig     ascorbic acid (VITAMIN C) 1000 MG TABS Take 1-2 tablets by mouth as needed      indomethacin (INDOCIN) 50 MG capsule Take 1 capsule (50 mg) by mouth 2 times daily (with meals) Take as needed for Gout.     Leuprolide Acetate, 4 Month, (LUPRON DEPOT, 4-MONTH, IM) Inject 45 mg into the muscle     lisinopril (ZESTRIL) 30 MG tablet      UNABLE TO FIND MEDICATION NAME: mushroom supplement that helps to limit tumor growth     UNABLE TO FIND MEDICATION NAME: Turkey tail supplement     No current facility-administered medications for this visit.         ALLERGIES     Allergies   Allergen Reactions     Chlorthalidone      Only issue when he is using Lupron- vision disturbances          REVIEW OF SYSTEMS   As above in the HPI, o/w complete 12-point ROS was negative.     PHYSICAL EXAM   BP (!) 145/81 (BP Location: Left arm)   Pulse 69   Temp 98.8  F (37.1  C) (Oral)   Wt 111.2 kg (245 lb 3.2 oz)   SpO2 97%   BMI 37.29 kg/m      SpO2  "Readings from Last 4 Encounters:   06/18/18 96%   10/30/17 97%   10/16/17 97%   06/19/17 96%     Wt Readings from Last 3 Encounters:   07/03/25 111.2 kg (245 lb 3.2 oz)   02/27/25 112.9 kg (249 lb)   12/19/24 106.9 kg (235 lb 10.8 oz)     GEN: NAD  HEENT: PERRL, EOMI, no icterus, injection or pallor. Oropharynx is clear.  NECK: no cervical or supraclavicular lymphadenopathy  LUNGS: clear bilaterally  CV: regular, no murmurs, rubs, or gallops  ABDOMEN: soft, non-tender, non-distended, normal bowel sounds, no hepatosplenomegaly by percussion or palpation  EXT: warm, well perfused, no edema  NEURO: alert  SKIN: lesion in subxiphoid (epigastrium) as below     LABORATORY AND IMAGING STUDIES     Recent Labs   Lab Test 06/30/25  0908 12/16/24  0943 06/17/24  0854 11/24/23  1320 11/24/23  1301 07/03/23  0857    139 137  --  138 141   POTASSIUM 4.6 4.4 4.9  --  5.2 4.4   CHLORIDE 105 104 102  --  104 104   CO2 24 25 27  --  24 26   ANIONGAP 12 10 8  --  10 11   BUN 20.9 20.0 9.5  --  17.9 18.0   CR 1.22* 1.02 1.09 1.2 1.04 1.07   * 124* 117*  --  113* 135*   KERRY 9.6 9.6 9.4  --  9.4 8.6*     No results for input(s): \"MAG\", \"PHOS\" in the last 74202 hours.  Recent Labs   Lab Test 06/30/25  0908 12/16/24  0943 06/17/24  0854 11/24/23  1301 07/03/23  0857   WBC 6.7 6.7 7.1 7.1 6.9   HGB 12.1* 12.1* 13.7 13.0* 12.3*    222 269 279 228   MCV 95 96 97 98 94   NEUTROPHIL 51 47 50 54 57     Recent Labs   Lab Test 06/30/25  0908 12/16/24  0943 06/17/24  0854 11/24/23  1301 07/03/23  0857 01/02/23  0845 01/02/23  0845   BILITOTAL 0.7 0.6 0.8 0.4 0.8  --  0.5   ALKPHOS 55 59 72 55 67  --  57   ALT 24 20 17 24 21  --  30   AST 32 28 23 31 31  --  24   ALBUMIN 4.3 4.2 4.2 4.4 4.3   < > 3.5   LDH  --   --   --  212 203  --  154    < > = values in this interval not displayed.     TSH   Date Value Ref Range Status   03/14/2005 2.22 0.4 - 5.0 mU/L Final     No results for input(s): \"CEA\" in the last 05665 hours.  Results " for orders placed or performed during the hospital encounter of 03/03/25   XR Cervical Spine 2/3 Views    Narrative    EXAM: XR CERVICAL SPINE 2/3 VIEWS  LOCATION: [facility]  DATE: 3/3/2025    INDICATION: Chronic neck pain. Cervical compression fracture, sequela.  COMPARISON: None.      Impression    IMPRESSION: Multilevel mild-to-moderate degenerative disc disease and facet arthropathy. Multilevel subtle grade 1 spondylolisthesis.   XR Lumbar Spine 2/3 Views    Narrative    EXAM: XR LUMBAR SPINE 2/3 VIEWS  LOCATION: [facility]  DATE: 03/03/2025    INDICATION: Low back pain.  COMPARISON: Abdomen and pelvis CT 11/24/2023.      Impression    IMPRESSION: Mild height loss involving the T12 vertebral body, probably chronic, similar compared to the prior. Otherwise, no fracture identified. Moderate to severe degenerative disc disease at L5-S1. Background of moderate degenerative disc disease.   Moderate severe lower facet arthropathy. Multilevel grade 1 spondylolisthesis. Levoconvex curvature.     Recent Labs   Lab Test 06/30/25  0908 12/16/24  0943 06/17/24  0854 11/24/23  1301 07/03/23  0857   PSA 0.17 0.33 16.00* 1.08 0.26   TESTOSTTOTAL 4* 9* 541 611 8*       ASSESSMENT AND PLAN   Prostate cancer with metastasis to retroperitoneal nodes with persistent PSA elevation post prostatectomy; ish 4+3 disease, tertiary score of 5  Elevated LFT's, obesity, elevated blood pressure at this visit  ECOG PS 0  No other medical comorbiditiies    Boaz is alone at this clinic visit.  He has been doing very well since the last visit.  He has no physical complaints at this visit.    I have reviewed all of the labs done prior to this clinic visit.  Labs are all completely normal including electrolytes, renal function, complete blood count and differential. He previously had mild normocytic anemia which has resolved. His hepatic panel which had revealed persistent elevation in his  transaminases has resolved at this visit.     He is aware of his PSA values.  His PSA had gone up to 16 ng/ml from 1.08 ng/ml in merely 6 months.  His PSA had doubled almost 4 times. I have switched him to continuous ADT. We will have to continually supppress his testosterone now as doubling time of 3 months or less is a poor prognostic sign and he has a doubling time of 6 weeks. I will administer leuprolide today.       Prostate cancer:  - Aggressive disease with PSA levels previously undetectable, now increased to 16. Disease is trending in the right direction with current treatment.  - Continue current treatment without breaks. Follow-up in six months.    Uncontrolled hypertension:  - Blood pressure readings high in the clinic, but lower at home. Lisinopril may be contributing to elevated creatinine levels.  - Increase lisinopril dose from 30 mg to 40 mg. Monitor blood pressure regularly. Prescription adjustment to be filled in three months.    I have encouraged him to keep working on his blood pressures and weight.     I will again see him in 6 months with labs a week prior to visit including CBC, CMP, PSA, testosterone.     The longitudinal plan of care for the diagnosis(es)/condition(s) as documented were addressed during this visit. Due to the added complexity in care, I will continue to support Boaz in the subsequent management and with ongoing continuity of care.    30 minutes spent on the date of the encounter doing chart review, history and exam, documentation and further activities as noted above      Pavel Norton    Hematologist and Medical Oncologist  [facility]      Again, thank you for allowing me to participate in the care of your patient.        Sincerely,        Pavel Norton MD    Electronically signed

## 2025-07-03 NOTE — PROGRESS NOTES
Infusion Nursing Note:  Boaz Acosta presents today for Lupron.    Patient seen by provider today: Yes: Cierra   present during visit today: Not Applicable.    Note: N/A.      Intravenous Access:  No Intravenous access/labs at this visit.    Treatment Conditions:  Not Applicable.      Post Infusion Assessment:  Patient tolerated injection without incident.  Site patent and intact, free from redness, edema or discomfort.       Discharge Plan:   Patient discharged in stable condition accompanied by: self.  Departure Mode: Ambulatory.      Susan Shane MA

## 2025-07-15 PROBLEM — G89.29 CHRONIC MIDLINE LOW BACK PAIN WITHOUT SCIATICA: Status: RESOLVED | Noted: 2024-03-25 | Resolved: 2025-07-15

## 2025-07-15 PROBLEM — G89.29 NECK PAIN, CHRONIC: Status: RESOLVED | Noted: 2025-03-11 | Resolved: 2025-07-15

## 2025-07-15 PROBLEM — M54.50 CHRONIC MIDLINE LOW BACK PAIN WITHOUT SCIATICA: Status: RESOLVED | Noted: 2024-03-25 | Resolved: 2025-07-15

## 2025-07-15 PROBLEM — M54.2 NECK PAIN, CHRONIC: Status: RESOLVED | Noted: 2025-03-11 | Resolved: 2025-07-15

## 2025-07-26 DIAGNOSIS — M1A.9XX1 CHRONIC GOUT INVOLVING TOE OF LEFT FOOT WITH TOPHUS, UNSPECIFIED CAUSE: ICD-10-CM

## 2025-07-28 RX ORDER — ALLOPURINOL 100 MG/1
200 TABLET ORAL DAILY
Qty: 180 TABLET | Refills: 1 | Status: SHIPPED | OUTPATIENT
Start: 2025-07-28